# Patient Record
Sex: MALE | Race: WHITE | NOT HISPANIC OR LATINO
[De-identification: names, ages, dates, MRNs, and addresses within clinical notes are randomized per-mention and may not be internally consistent; named-entity substitution may affect disease eponyms.]

---

## 2017-12-26 PROBLEM — Z00.00 ENCOUNTER FOR PREVENTIVE HEALTH EXAMINATION: Status: ACTIVE | Noted: 2017-12-26

## 2018-01-11 ENCOUNTER — APPOINTMENT (OUTPATIENT)
Dept: SURGERY | Facility: CLINIC | Age: 53
End: 2018-01-11
Payer: COMMERCIAL

## 2018-01-11 VITALS — WEIGHT: 195 LBS | BODY MASS INDEX: 27.98 KG/M2

## 2018-01-11 PROCEDURE — 99243 OFF/OP CNSLTJ NEW/EST LOW 30: CPT

## 2018-01-31 ENCOUNTER — OUTPATIENT (OUTPATIENT)
Dept: OUTPATIENT SERVICES | Facility: HOSPITAL | Age: 53
LOS: 1 days | Discharge: HOME | End: 2018-01-31

## 2018-01-31 ENCOUNTER — APPOINTMENT (OUTPATIENT)
Dept: SURGERY | Facility: AMBULATORY SURGERY CENTER | Age: 53
End: 2018-01-31
Payer: COMMERCIAL

## 2018-01-31 PROCEDURE — 49561: CPT

## 2018-01-31 PROCEDURE — 49568: CPT

## 2018-02-02 DIAGNOSIS — K43.6 OTHER AND UNSPECIFIED VENTRAL HERNIA WITH OBSTRUCTION, WITHOUT GANGRENE: ICD-10-CM

## 2018-02-08 ENCOUNTER — APPOINTMENT (OUTPATIENT)
Dept: SURGERY | Facility: CLINIC | Age: 53
End: 2018-02-08
Payer: COMMERCIAL

## 2018-02-08 PROCEDURE — 99024 POSTOP FOLLOW-UP VISIT: CPT

## 2018-06-28 ENCOUNTER — OUTPATIENT (OUTPATIENT)
Dept: OUTPATIENT SERVICES | Facility: HOSPITAL | Age: 53
LOS: 1 days | Discharge: HOME | End: 2018-06-28
Payer: MEDICAID

## 2018-06-28 PROCEDURE — 93880 EXTRACRANIAL BILAT STUDY: CPT | Mod: 26

## 2018-06-28 PROCEDURE — 93970 EXTREMITY STUDY: CPT | Mod: 26

## 2018-07-06 DIAGNOSIS — I70.213 ATHEROSCLEROSIS OF NATIVE ARTERIES OF EXTREMITIES WITH INTERMITTENT CLAUDICATION, BILATERAL LEGS: ICD-10-CM

## 2018-07-06 DIAGNOSIS — R09.89 OTHER SPECIFIED SYMPTOMS AND SIGNS INVOLVING THE CIRCULATORY AND RESPIRATORY SYSTEMS: ICD-10-CM

## 2020-09-20 ENCOUNTER — INPATIENT (INPATIENT)
Facility: HOSPITAL | Age: 55
LOS: 2 days | Discharge: HOME | End: 2020-09-23
Attending: INTERNAL MEDICINE | Admitting: INTERNAL MEDICINE
Payer: MEDICAID

## 2020-09-20 VITALS
HEART RATE: 81 BPM | RESPIRATION RATE: 18 BRPM | DIASTOLIC BLOOD PRESSURE: 67 MMHG | WEIGHT: 195.11 LBS | SYSTOLIC BLOOD PRESSURE: 129 MMHG | TEMPERATURE: 98 F | OXYGEN SATURATION: 98 %

## 2020-09-20 LAB
ALBUMIN SERPL ELPH-MCNC: 4.6 G/DL — SIGNIFICANT CHANGE UP (ref 3.5–5.2)
ALP SERPL-CCNC: 66 U/L — SIGNIFICANT CHANGE UP (ref 30–115)
ALT FLD-CCNC: 14 U/L — SIGNIFICANT CHANGE UP (ref 0–41)
ANION GAP SERPL CALC-SCNC: 11 MMOL/L — SIGNIFICANT CHANGE UP (ref 7–14)
APTT BLD: 28.8 SEC — SIGNIFICANT CHANGE UP (ref 27–39.2)
AST SERPL-CCNC: 19 U/L — SIGNIFICANT CHANGE UP (ref 0–41)
BASOPHILS # BLD AUTO: 0.01 K/UL — SIGNIFICANT CHANGE UP (ref 0–0.2)
BASOPHILS NFR BLD AUTO: 0.1 % — SIGNIFICANT CHANGE UP (ref 0–1)
BILIRUB DIRECT SERPL-MCNC: <0.2 MG/DL — SIGNIFICANT CHANGE UP (ref 0–0.2)
BILIRUB INDIRECT FLD-MCNC: >0.3 MG/DL — SIGNIFICANT CHANGE UP (ref 0.2–1.2)
BILIRUB SERPL-MCNC: 0.5 MG/DL — SIGNIFICANT CHANGE UP (ref 0.2–1.2)
BLD GP AB SCN SERPL QL: SIGNIFICANT CHANGE UP
BUN SERPL-MCNC: 21 MG/DL — HIGH (ref 10–20)
CALCIUM SERPL-MCNC: 9.3 MG/DL — SIGNIFICANT CHANGE UP (ref 8.5–10.1)
CHLORIDE SERPL-SCNC: 96 MMOL/L — LOW (ref 98–110)
CO2 SERPL-SCNC: 27 MMOL/L — SIGNIFICANT CHANGE UP (ref 17–32)
CREAT SERPL-MCNC: 0.9 MG/DL — SIGNIFICANT CHANGE UP (ref 0.7–1.5)
EOSINOPHIL # BLD AUTO: 0 K/UL — SIGNIFICANT CHANGE UP (ref 0–0.7)
EOSINOPHIL NFR BLD AUTO: 0 % — SIGNIFICANT CHANGE UP (ref 0–8)
GLUCOSE SERPL-MCNC: 156 MG/DL — HIGH (ref 70–99)
HCT VFR BLD CALC: 46.7 % — SIGNIFICANT CHANGE UP (ref 42–52)
HGB BLD-MCNC: 15.8 G/DL — SIGNIFICANT CHANGE UP (ref 14–18)
IMM GRANULOCYTES NFR BLD AUTO: 0.3 % — SIGNIFICANT CHANGE UP (ref 0.1–0.3)
INR BLD: 1.08 RATIO — SIGNIFICANT CHANGE UP (ref 0.65–1.3)
LACTATE SERPL-SCNC: 1.7 MMOL/L — SIGNIFICANT CHANGE UP (ref 0.7–2)
LIDOCAIN IGE QN: 1918 U/L — HIGH (ref 7–60)
LYMPHOCYTES # BLD AUTO: 0.53 K/UL — LOW (ref 1.2–3.4)
LYMPHOCYTES # BLD AUTO: 3.3 % — LOW (ref 20.5–51.1)
MCHC RBC-ENTMCNC: 29.9 PG — SIGNIFICANT CHANGE UP (ref 27–31)
MCHC RBC-ENTMCNC: 33.8 G/DL — SIGNIFICANT CHANGE UP (ref 32–37)
MCV RBC AUTO: 88.3 FL — SIGNIFICANT CHANGE UP (ref 80–94)
MONOCYTES # BLD AUTO: 0.5 K/UL — SIGNIFICANT CHANGE UP (ref 0.1–0.6)
MONOCYTES NFR BLD AUTO: 3.2 % — SIGNIFICANT CHANGE UP (ref 1.7–9.3)
NEUTROPHILS # BLD AUTO: 14.75 K/UL — HIGH (ref 1.4–6.5)
NEUTROPHILS NFR BLD AUTO: 93.1 % — HIGH (ref 42.2–75.2)
NRBC # BLD: 0 /100 WBCS — SIGNIFICANT CHANGE UP (ref 0–0)
PLATELET # BLD AUTO: 280 K/UL — SIGNIFICANT CHANGE UP (ref 130–400)
POTASSIUM SERPL-MCNC: 3.8 MMOL/L — SIGNIFICANT CHANGE UP (ref 3.5–5)
POTASSIUM SERPL-SCNC: 3.8 MMOL/L — SIGNIFICANT CHANGE UP (ref 3.5–5)
PROT SERPL-MCNC: 7.1 G/DL — SIGNIFICANT CHANGE UP (ref 6–8)
PROTHROM AB SERPL-ACNC: 12.4 SEC — SIGNIFICANT CHANGE UP (ref 9.95–12.87)
RBC # BLD: 5.29 M/UL — SIGNIFICANT CHANGE UP (ref 4.7–6.1)
RBC # FLD: 12.5 % — SIGNIFICANT CHANGE UP (ref 11.5–14.5)
SODIUM SERPL-SCNC: 134 MMOL/L — LOW (ref 135–146)
WBC # BLD: 15.84 K/UL — HIGH (ref 4.8–10.8)
WBC # FLD AUTO: 15.84 K/UL — HIGH (ref 4.8–10.8)

## 2020-09-20 PROCEDURE — 74177 CT ABD & PELVIS W/CONTRAST: CPT | Mod: 26

## 2020-09-20 PROCEDURE — 99285 EMERGENCY DEPT VISIT HI MDM: CPT

## 2020-09-20 PROCEDURE — 71045 X-RAY EXAM CHEST 1 VIEW: CPT | Mod: 26

## 2020-09-20 RX ORDER — ONDANSETRON 8 MG/1
4 TABLET, FILM COATED ORAL ONCE
Refills: 0 | Status: COMPLETED | OUTPATIENT
Start: 2020-09-20 | End: 2020-09-20

## 2020-09-20 RX ORDER — MORPHINE SULFATE 50 MG/1
4 CAPSULE, EXTENDED RELEASE ORAL ONCE
Refills: 0 | Status: DISCONTINUED | OUTPATIENT
Start: 2020-09-20 | End: 2020-09-20

## 2020-09-20 RX ORDER — SODIUM CHLORIDE 9 MG/ML
2700 INJECTION, SOLUTION INTRAVENOUS ONCE
Refills: 0 | Status: COMPLETED | OUTPATIENT
Start: 2020-09-20 | End: 2020-09-20

## 2020-09-20 RX ORDER — SODIUM CHLORIDE 9 MG/ML
1000 INJECTION, SOLUTION INTRAVENOUS ONCE
Refills: 0 | Status: COMPLETED | OUTPATIENT
Start: 2020-09-20 | End: 2020-09-20

## 2020-09-20 RX ORDER — MORPHINE SULFATE 50 MG/1
6 CAPSULE, EXTENDED RELEASE ORAL ONCE
Refills: 0 | Status: DISCONTINUED | OUTPATIENT
Start: 2020-09-20 | End: 2020-09-20

## 2020-09-20 RX ADMIN — MORPHINE SULFATE 6 MILLIGRAM(S): 50 CAPSULE, EXTENDED RELEASE ORAL at 22:15

## 2020-09-20 RX ADMIN — MORPHINE SULFATE 4 MILLIGRAM(S): 50 CAPSULE, EXTENDED RELEASE ORAL at 21:08

## 2020-09-20 RX ADMIN — SODIUM CHLORIDE 2700 MILLILITER(S): 9 INJECTION, SOLUTION INTRAVENOUS at 21:09

## 2020-09-20 RX ADMIN — ONDANSETRON 4 MILLIGRAM(S): 8 TABLET, FILM COATED ORAL at 21:08

## 2020-09-20 RX ADMIN — SODIUM CHLORIDE 1000 MILLILITER(S): 9 INJECTION, SOLUTION INTRAVENOUS at 23:11

## 2020-09-20 RX ADMIN — MORPHINE SULFATE 4 MILLIGRAM(S): 50 CAPSULE, EXTENDED RELEASE ORAL at 23:11

## 2020-09-20 RX ADMIN — MORPHINE SULFATE 6 MILLIGRAM(S): 50 CAPSULE, EXTENDED RELEASE ORAL at 23:11

## 2020-09-20 NOTE — ED PROVIDER NOTE - NS ED ROS FT
General: No fever, chills, or weakness.  Eyes:  No visual changes, eye pain or discharge.  ENMT:  No hearing changes, pain, no sore throat or runny nose, no difficulty swallowing  Cardiac:  No chest pain, SOB or edema. No chest pain with exertion.  Respiratory:  No cough or respiratory distress. No hemoptysis. No history of asthma or RAD.  GI:  Abd pain. N/v nbnb. No diarrhea.   :  No dysuria, frequency or burning. no testicular pain  MS:  No myalgia, muscle weakness, joint pain or back pain.  Neuro:  No headache.  No LOC. No change in ambulation. No dizziness.  Skin:  No skin rash.   Endocrine: No history of thyroid disease or diabetes.

## 2020-09-20 NOTE — ED ADULT TRIAGE NOTE - PAIN: PRESENCE, MLM
Refill request received from pharmacy.  Medication pending for approval.     Patient information below:      Hemoglobin A1C (%)   Date Value   07/12/2016 5.7     LDL Calculated (mg/dL)   Date Value   08/29/2017 86     AST (U/L)   Date Value   08/29/2017 27     ALT (U/L)   Date Value   08/29/2017 48 (H)     BUN (mg/dL)   Date Value   08/29/2017 21 (H)      (goal A1C is < 7)   (goal LDL is <100) need 30-50% reduction from baseline     BP Readings from Last 3 Encounters:   08/29/17 100/70   05/03/17 121/86   04/24/17 (!) 154/89    (goal /80)      All Future Testing planned in [facility]:  Lab Frequency Next Occurrence   Protime         Last Office Visit With PCP:  10/30/2017      Next Visit Date:  Future Appointments  Date Time Provider Octavio Camargo   12/5/2017 9:00 AM Paramjit Calderón APR[address]            Patient Active Problem List:     DVT (deep venous thrombosis) ([address]     Generalized OA     DDD (degenerative disc disease)     Dyspepsia     Incisional hernia     Peripheral neuropathy ([address]     Hyperkalemia     Hyperlipidemia     Polyarthralgia     Edema     Vitamin D deficiency     B12 deficiency     Colon cancer screening     Internal hemorrhoid     Diverticular disease of large intestine without perforation or abscess
complains of pain/discomfort

## 2020-09-20 NOTE — ED PROVIDER NOTE - CLINICAL SUMMARY MEDICAL DECISION MAKING FREE TEXT BOX
55M presented to [facility] for intermittent midline abdominal pain, exacerbated with food, associated with vomiting nbnb x3. Pt in severe pain, upper abdominal tenderness on exam. Labs notable for lipase of 1,918, WBC of 15. LFTs wnl. CT with evidence of acute pancreatitis. RUQ US confirmed pancreatitis, no cholecystitis. Pt denies EtOH abuse. Morphine, IVF, zofran given and pt npo.

## 2020-09-20 NOTE — ED PROVIDER NOTE - PHYSICAL EXAMINATION
Constitutional: Well developed, well nourished. NAD. Good general hygiene  Head: Atraumatic.  Eyes: PERRLA. EOMI without discomfort.   ENT: No nasal discharge. Mucous membranes moist.  Neck: Supple. Painless ROM.  Cardiovascular: Regular rhythm. Regular rate. Normal S1 and S2. No murmurs. 2+ pulses in all extremities.   Pulmonary: Normal respiratory rate and effort. Lungs clear to auscultation bilaterally. No wheezing, rales, or rhonchi. Bilateral, equal lung expansion.   Abdominal: Soft. Epigastric tenderness, guarding. no rigidity.  : Normal testicular lie, no tenderness, no ecchymosis.   Extremities. Pelvis stable. No lower extremity edema. Symmetric calves.  Skin: No rashes.   Neuro: AAOx3. No focal neurological deficits.  Psych: Normal mood. Normal affect.

## 2020-09-20 NOTE — ED ADULT NURSE NOTE - OBJECTIVE STATEMENT
Presents in ED c/o abdominal pain for 1 month but today pain got worse 10/10. Vomited several times. Denies diarrhea.

## 2020-09-20 NOTE — ED PROVIDER NOTE - OBJECTIVE STATEMENT
55M h/o prior alcohol use several years ago, prior smoker p/w abd pain. Epigastric, 9/10, radiating to back, constant, onset earlier today. Admits to n/v nbnb X3 today. Denies fever/chills, cough, sore throat, Cp, SOB, dysuria, testicular pain. never had pain like this before, never had surgeries in abdomen.

## 2020-09-20 NOTE — ED ADULT TRIAGE NOTE - CHIEF COMPLAINT QUOTE
Pt c/o abd pain X 1 month, but today the pain came back 10/10, had 3 episodes of NBNB vomiting, denies diarrhea or fever.

## 2020-09-20 NOTE — ED PROVIDER NOTE - CARE PLAN
Principal Discharge DX:	Acute pancreatitis without infection or necrosis, unspecified pancreatitis type

## 2020-09-20 NOTE — ED PROVIDER NOTE - ATTENDING CONTRIBUTION TO CARE
55M with PMH periumbilical hernia repair who presents to [facility] for periumbilical pain that has been intermittent over the past month, but became acutely worse today. He reports symptoms usually exacerbated with food. He ate a protein bar this morning and felt sharp/burning periumbilical pain associated with vomiting nbnb x3. Patient had a soft BM this am. Denies bloody or melanotic stool, urinary Sx.     CONSTITUTIONAL: Well-developed; well-nourished; in no acute distress. Sitting up and providing appropriate history and physical examination  SKIN: skin exam is warm and dry, no acute rash.  HEAD: Normocephalic; atraumatic.  EYES: PERRL, 3 mm bilateral, no nystagmus, EOM intact; conjunctiva and sclera clear.  ENT: No nasal discharge; airway clear.  NECK: Supple; non tender. + full passive ROM in all directions. No JVD  CARD: S1, S2 normal; no murmurs, gallops, or rubs. Regular rate and rhythm. + Symmetric Strong Pulses  RESP: No wheezes, rales or rhonchi. Good air movement bilaterally  ABD: soft; non-distended; + left upper and mildine abdominal tenderness with involuntary guarding. No Rebound, No signs of peritonitis, No CVA tenderness. No pulsatile abdominal mass. + Strong and Symmetric Pulses  EXT: Normal ROM. No clubbing, cyanosis or edema. Dp and Pt Pulses intact. Cap refill less than 3 seconds  NEURO: stable gait, no sensory or motor deficits, Alert, oriented, grossly unremarkable. No Focal deficits. GCS 15.  PSYCH: Cooperative, appropriate.    P: will assess for recurrent hernia, no evidence of strangulation or incarceration. Will also assess for colitis, diverticulitis, pancreatitis, cholelithiasis. Will obtain cbc, cmp, lipase, ua, coags, CT abd/pelvis, will treat pain, give zofran, ivf and reassess. 55M with PMH periumbilical hernia repair presents to [facility] for periumbilical pain that has been intermittent over the past month, but became acutely worse today. He reports symptoms usually exacerbated with food. He ate a protein bar this morning and felt sharp/burning periumbilical pain associated with vomiting nbnb x3. Patient had a soft BM this am. Denies bloody or melanotic stool, urinary Sx.     CONSTITUTIONAL: Well-developed; well-nourished; in no acute distress. Sitting up and providing appropriate history and physical examination  SKIN: skin exam is warm and dry, no acute rash.  HEAD: Normocephalic; atraumatic.  EYES: PERRL, 3 mm bilateral, no nystagmus, EOM intact; conjunctiva and sclera clear.  ENT: No nasal discharge; airway clear.  NECK: Supple; non tender. + full passive ROM in all directions. No JVD  CARD: S1, S2 normal; no murmurs, gallops, or rubs. Regular rate and rhythm. + Symmetric Strong Pulses  RESP: No wheezes, rales or rhonchi. Good air movement bilaterally  ABD: soft; non-distended; + left upper and mildine abdominal tenderness with involuntary guarding. No Rebound, No signs of peritonitis, No CVA tenderness. No pulsatile abdominal mass. + Strong and Symmetric Pulses  EXT: Normal ROM. No clubbing, cyanosis or edema. Dp and Pt Pulses intact. Cap refill less than 3 seconds  NEURO: stable gait, no sensory or motor deficits, Alert, oriented, grossly unremarkable. No Focal deficits. GCS 15.  PSYCH: Cooperative, appropriate.    P: will assess for recurrent hernia, no evidence of strangulation or incarceration. Will also assess for colitis, diverticulitis, pancreatitis, cholelithiasis. Will obtain cbc, cmp, lipase, ua, coags, CT abd/pelvis, will treat pain, give zofran, ivf and reassess.

## 2020-09-21 LAB
A1C WITH ESTIMATED AVERAGE GLUCOSE RESULT: 5.9 % — HIGH (ref 4–5.6)
ALBUMIN SERPL ELPH-MCNC: 3.8 G/DL — SIGNIFICANT CHANGE UP (ref 3.5–5.2)
ALP SERPL-CCNC: 49 U/L — SIGNIFICANT CHANGE UP (ref 30–115)
ALT FLD-CCNC: 9 U/L — SIGNIFICANT CHANGE UP (ref 0–41)
ANION GAP SERPL CALC-SCNC: 8 MMOL/L — SIGNIFICANT CHANGE UP (ref 7–14)
APPEARANCE UR: CLEAR — SIGNIFICANT CHANGE UP
AST SERPL-CCNC: 14 U/L — SIGNIFICANT CHANGE UP (ref 0–41)
BASOPHILS # BLD AUTO: 0.02 K/UL — SIGNIFICANT CHANGE UP (ref 0–0.2)
BASOPHILS NFR BLD AUTO: 0.1 % — SIGNIFICANT CHANGE UP (ref 0–1)
BILIRUB SERPL-MCNC: 0.9 MG/DL — SIGNIFICANT CHANGE UP (ref 0.2–1.2)
BILIRUB UR-MCNC: NEGATIVE — SIGNIFICANT CHANGE UP
BUN SERPL-MCNC: 10 MG/DL — SIGNIFICANT CHANGE UP (ref 10–20)
CALCIUM SERPL-MCNC: 8.9 MG/DL — SIGNIFICANT CHANGE UP (ref 8.5–10.1)
CHLORIDE SERPL-SCNC: 100 MMOL/L — SIGNIFICANT CHANGE UP (ref 98–110)
CHOLEST SERPL-MCNC: 180 MG/DL — SIGNIFICANT CHANGE UP (ref 100–200)
CO2 SERPL-SCNC: 29 MMOL/L — SIGNIFICANT CHANGE UP (ref 17–32)
COLOR SPEC: SIGNIFICANT CHANGE UP
CREAT SERPL-MCNC: 0.8 MG/DL — SIGNIFICANT CHANGE UP (ref 0.7–1.5)
DIFF PNL FLD: NEGATIVE — SIGNIFICANT CHANGE UP
EOSINOPHIL # BLD AUTO: 0.01 K/UL — SIGNIFICANT CHANGE UP (ref 0–0.7)
EOSINOPHIL NFR BLD AUTO: 0.1 % — SIGNIFICANT CHANGE UP (ref 0–8)
ESTIMATED AVERAGE GLUCOSE: 123 MG/DL — HIGH (ref 68–114)
GLUCOSE SERPL-MCNC: 106 MG/DL — HIGH (ref 70–99)
GLUCOSE UR QL: NEGATIVE — SIGNIFICANT CHANGE UP
HCT VFR BLD CALC: 43.3 % — SIGNIFICANT CHANGE UP (ref 42–52)
HCV AB S/CO SERPL IA: 0.04 COI — SIGNIFICANT CHANGE UP
HCV AB SERPL-IMP: SIGNIFICANT CHANGE UP
HDLC SERPL-MCNC: 49 MG/DL — SIGNIFICANT CHANGE UP
HGB BLD-MCNC: 14.4 G/DL — SIGNIFICANT CHANGE UP (ref 14–18)
IMM GRANULOCYTES NFR BLD AUTO: 0.6 % — HIGH (ref 0.1–0.3)
KETONES UR-MCNC: NEGATIVE — SIGNIFICANT CHANGE UP
LEUKOCYTE ESTERASE UR-ACNC: NEGATIVE — SIGNIFICANT CHANGE UP
LIPID PNL WITH DIRECT LDL SERPL: 119 MG/DL — SIGNIFICANT CHANGE UP (ref 4–129)
LYMPHOCYTES # BLD AUTO: 1.19 K/UL — LOW (ref 1.2–3.4)
LYMPHOCYTES # BLD AUTO: 7.4 % — LOW (ref 20.5–51.1)
MAGNESIUM SERPL-MCNC: 1.7 MG/DL — LOW (ref 1.8–2.4)
MCHC RBC-ENTMCNC: 30 PG — SIGNIFICANT CHANGE UP (ref 27–31)
MCHC RBC-ENTMCNC: 33.3 G/DL — SIGNIFICANT CHANGE UP (ref 32–37)
MCV RBC AUTO: 90.2 FL — SIGNIFICANT CHANGE UP (ref 80–94)
MONOCYTES # BLD AUTO: 1.1 K/UL — HIGH (ref 0.1–0.6)
MONOCYTES NFR BLD AUTO: 6.8 % — SIGNIFICANT CHANGE UP (ref 1.7–9.3)
NEUTROPHILS # BLD AUTO: 13.78 K/UL — HIGH (ref 1.4–6.5)
NEUTROPHILS NFR BLD AUTO: 85 % — HIGH (ref 42.2–75.2)
NITRITE UR-MCNC: NEGATIVE — SIGNIFICANT CHANGE UP
NRBC # BLD: 0 /100 WBCS — SIGNIFICANT CHANGE UP (ref 0–0)
PH UR: 7 — SIGNIFICANT CHANGE UP (ref 5–8)
PLATELET # BLD AUTO: 237 K/UL — SIGNIFICANT CHANGE UP (ref 130–400)
POTASSIUM SERPL-MCNC: 4 MMOL/L — SIGNIFICANT CHANGE UP (ref 3.5–5)
POTASSIUM SERPL-SCNC: 4 MMOL/L — SIGNIFICANT CHANGE UP (ref 3.5–5)
PROT SERPL-MCNC: 5.8 G/DL — LOW (ref 6–8)
PROT UR-MCNC: SIGNIFICANT CHANGE UP
RBC # BLD: 4.8 M/UL — SIGNIFICANT CHANGE UP (ref 4.7–6.1)
RBC # FLD: 12.7 % — SIGNIFICANT CHANGE UP (ref 11.5–14.5)
SARS-COV-2 RNA SPEC QL NAA+PROBE: SIGNIFICANT CHANGE UP
SODIUM SERPL-SCNC: 137 MMOL/L — SIGNIFICANT CHANGE UP (ref 135–146)
SP GR SPEC: >1.05 (ref 1.01–1.03)
TOTAL CHOLESTEROL/HDL RATIO MEASUREMENT: 3.7 RATIO — LOW (ref 4–5.5)
TRIGL SERPL-MCNC: 68 MG/DL — SIGNIFICANT CHANGE UP (ref 10–149)
TRIGL SERPL-MCNC: 70 MG/DL — SIGNIFICANT CHANGE UP (ref 10–149)
UROBILINOGEN FLD QL: SIGNIFICANT CHANGE UP
WBC # BLD: 16.19 K/UL — HIGH (ref 4.8–10.8)
WBC # FLD AUTO: 16.19 K/UL — HIGH (ref 4.8–10.8)

## 2020-09-21 PROCEDURE — 76705 ECHO EXAM OF ABDOMEN: CPT | Mod: 26

## 2020-09-21 PROCEDURE — 99223 1ST HOSP IP/OBS HIGH 75: CPT

## 2020-09-21 PROCEDURE — 71046 X-RAY EXAM CHEST 2 VIEWS: CPT | Mod: 26

## 2020-09-21 RX ORDER — CHLORHEXIDINE GLUCONATE 213 G/1000ML
1 SOLUTION TOPICAL ONCE
Refills: 0 | Status: COMPLETED | OUTPATIENT
Start: 2020-09-21 | End: 2020-09-21

## 2020-09-21 RX ORDER — ENOXAPARIN SODIUM 100 MG/ML
40 INJECTION SUBCUTANEOUS AT BEDTIME
Refills: 0 | Status: DISCONTINUED | OUTPATIENT
Start: 2020-09-21 | End: 2020-09-23

## 2020-09-21 RX ORDER — ONDANSETRON 8 MG/1
4 TABLET, FILM COATED ORAL EVERY 6 HOURS
Refills: 0 | Status: DISCONTINUED | OUTPATIENT
Start: 2020-09-21 | End: 2020-09-23

## 2020-09-21 RX ORDER — MORPHINE SULFATE 50 MG/1
4 CAPSULE, EXTENDED RELEASE ORAL EVERY 4 HOURS
Refills: 0 | Status: DISCONTINUED | OUTPATIENT
Start: 2020-09-21 | End: 2020-09-21

## 2020-09-21 RX ORDER — ACETAMINOPHEN 500 MG
650 TABLET ORAL EVERY 6 HOURS
Refills: 0 | Status: DISCONTINUED | OUTPATIENT
Start: 2020-09-21 | End: 2020-09-23

## 2020-09-21 RX ORDER — MORPHINE SULFATE 50 MG/1
2 CAPSULE, EXTENDED RELEASE ORAL
Refills: 0 | Status: DISCONTINUED | OUTPATIENT
Start: 2020-09-21 | End: 2020-09-21

## 2020-09-21 RX ORDER — MORPHINE SULFATE 50 MG/1
2 CAPSULE, EXTENDED RELEASE ORAL ONCE
Refills: 0 | Status: DISCONTINUED | OUTPATIENT
Start: 2020-09-21 | End: 2020-09-21

## 2020-09-21 RX ORDER — SENNA PLUS 8.6 MG/1
2 TABLET ORAL AT BEDTIME
Refills: 0 | Status: DISCONTINUED | OUTPATIENT
Start: 2020-09-21 | End: 2020-09-23

## 2020-09-21 RX ORDER — MORPHINE SULFATE 50 MG/1
4 CAPSULE, EXTENDED RELEASE ORAL EVERY 4 HOURS
Refills: 0 | Status: DISCONTINUED | OUTPATIENT
Start: 2020-09-21 | End: 2020-09-23

## 2020-09-21 RX ORDER — SODIUM CHLORIDE 9 MG/ML
1000 INJECTION, SOLUTION INTRAVENOUS
Refills: 0 | Status: DISCONTINUED | OUTPATIENT
Start: 2020-09-21 | End: 2020-09-23

## 2020-09-21 RX ADMIN — Medication 1 TABLET(S): at 12:22

## 2020-09-21 RX ADMIN — SODIUM CHLORIDE 250 MILLILITER(S): 9 INJECTION, SOLUTION INTRAVENOUS at 03:53

## 2020-09-21 RX ADMIN — MORPHINE SULFATE 4 MILLIGRAM(S): 50 CAPSULE, EXTENDED RELEASE ORAL at 20:04

## 2020-09-21 RX ADMIN — MORPHINE SULFATE 2 MILLIGRAM(S): 50 CAPSULE, EXTENDED RELEASE ORAL at 08:53

## 2020-09-21 RX ADMIN — ENOXAPARIN SODIUM 40 MILLIGRAM(S): 100 INJECTION SUBCUTANEOUS at 21:26

## 2020-09-21 RX ADMIN — SODIUM CHLORIDE 250 MILLILITER(S): 9 INJECTION, SOLUTION INTRAVENOUS at 20:04

## 2020-09-21 RX ADMIN — MORPHINE SULFATE 4 MILLIGRAM(S): 50 CAPSULE, EXTENDED RELEASE ORAL at 14:33

## 2020-09-21 RX ADMIN — SODIUM CHLORIDE 1000 MILLILITER(S): 9 INJECTION, SOLUTION INTRAVENOUS at 03:12

## 2020-09-21 RX ADMIN — MORPHINE SULFATE 4 MILLIGRAM(S): 50 CAPSULE, EXTENDED RELEASE ORAL at 21:26

## 2020-09-21 RX ADMIN — MORPHINE SULFATE 2 MILLIGRAM(S): 50 CAPSULE, EXTENDED RELEASE ORAL at 09:10

## 2020-09-21 RX ADMIN — MORPHINE SULFATE 2 MILLIGRAM(S): 50 CAPSULE, EXTENDED RELEASE ORAL at 07:22

## 2020-09-21 RX ADMIN — Medication 5 MILLIGRAM(S): at 21:26

## 2020-09-21 RX ADMIN — MORPHINE SULFATE 2 MILLIGRAM(S): 50 CAPSULE, EXTENDED RELEASE ORAL at 06:45

## 2020-09-21 RX ADMIN — MORPHINE SULFATE 4 MILLIGRAM(S): 50 CAPSULE, EXTENDED RELEASE ORAL at 14:17

## 2020-09-21 RX ADMIN — SODIUM CHLORIDE 2700 MILLILITER(S): 9 INJECTION, SOLUTION INTRAVENOUS at 03:12

## 2020-09-21 RX ADMIN — SENNA PLUS 2 TABLET(S): 8.6 TABLET ORAL at 21:26

## 2020-09-21 RX ADMIN — MORPHINE SULFATE 2 MILLIGRAM(S): 50 CAPSULE, EXTENDED RELEASE ORAL at 04:49

## 2020-09-21 RX ADMIN — MORPHINE SULFATE 2 MILLIGRAM(S): 50 CAPSULE, EXTENDED RELEASE ORAL at 03:43

## 2020-09-21 NOTE — H&P ADULT - ATTENDING COMMENTS
Chart reviewed, patient examined. Pertinent results reviewed.  Case discussed with HO; specialist evaluation pending; CT noted  HD#1     3rd episode for pt. He is not a heavy ETOH user.   Pain is controlled with opioids.   GI fellow is seeing the pt this AM.    Vital Signs Last 24 Hrs  T(C): 36.7 (21 Sep 2020 08:00), Max: 36.8 (20 Sep 2020 20:25)  T(F): 98 (21 Sep 2020 08:00), Max: 98.2 (20 Sep 2020 20:25)  HR: 83 (21 Sep 2020 08:00) (81 - 83)  BP: 106/55 (21 Sep 2020 08:00) (106/55 - 129/67)  BP(mean): --  RR: 18 (21 Sep 2020 08:00) (18 - 18)  SpO2: 97% (21 Sep 2020 08:00) (97% - 98%)  PE: pt overweight; NAD  MMM; LUNGS: clear; HT: RRR, no MRG  ABD: obese; BS+; + epigastric tenderness; no HSM, Mass  EXT: no CCE; NEURO: grossly intact    I/P: Acute Pancreatitis  -IV fluids/NPO  - GI to see & advise;   - MR will be reviewed with GI as to timing  - Pain control Chart reviewed, patient examined. Pertinent results reviewed.  Case discussed with HO; specialist evaluation pending; CT noted  HD#1; See progress note dated today as well     3rd episode for pt. He is not a heavy ETOH user.   Pain is controlled with opioids.   GI fellow is seeing the pt this AM.    Vital Signs Last 24 Hrs  T(C): 36.7 (21 Sep 2020 08:00), Max: 36.8 (20 Sep 2020 20:25)  T(F): 98 (21 Sep 2020 08:00), Max: 98.2 (20 Sep 2020 20:25)  HR: 83 (21 Sep 2020 08:00) (81 - 83)  BP: 106/55 (21 Sep 2020 08:00) (106/55 - 129/67)  BP(mean): --  RR: 18 (21 Sep 2020 08:00) (18 - 18)  SpO2: 97% (21 Sep 2020 08:00) (97% - 98%)  PE: pt overweight; NAD  MMM; LUNGS: clear; HT: RRR, no MRG  ABD: obese; BS+; + epigastric tenderness; no HSM, Mass  EXT: no CCE; NEURO: grossly intact    I/P: Acute Pancreatitis  -IV fluids/NPO  - GI to see & advise;   - MR will be reviewed with GI as to timing  - Pain control

## 2020-09-21 NOTE — H&P ADULT - NSHPPHYSICALEXAM_GEN_ALL_CORE
LOS:     VITALS:   T(C): 36.8 (09-20-20 @ 20:25), Max: 36.8 (09-20-20 @ 20:25)  HR: 81 (09-20-20 @ 20:25) (81 - 81)  BP: 129/67 (09-20-20 @ 20:25) (129/67 - 129/67)  RR: 18 (09-20-20 @ 20:25) (18 - 18)  SpO2: 98% (09-20-20 @ 20:25) (98% - 98%)    GENERAL: NAD, lying in bed comfortably  HEAD:  Atraumatic, Normocephalic  EYES: EOMI, PERRLA, conjunctiva and sclera clear  ENT: Moist mucous membranes  NECK: Supple, No JVD  CHEST/LUNG: Clear to auscultation bilaterally; No rales, rhonchi, wheezing, or rubs. Unlabored respirations  HEART: Regular rate and rhythm; No murmurs, rubs, or gallops  ABDOMEN: BSx4; Soft, nontender, nondistended  EXTREMITIES:  2+ Peripheral Pulses, brisk capillary refill. No clubbing, cyanosis, or edema  NERVOUS SYSTEM:  A&Ox3, no focal deficits   SKIN: No rashes or lesions

## 2020-09-21 NOTE — PROGRESS NOTE ADULT - ASSESSMENT
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  - continue goal directed aggressive fluid resuscitation, repeat Hct/BUN improvement noted   - pain control  - will need MRCP and MRI pancreas protocol as o/p   - F/u with GI guidance for this serious condition      Condition: Serious  Prognosis: Fair  This borrow aim for home on discharge estimate 2-5 days depending on ongoing response to therapy.

## 2020-09-21 NOTE — CONSULT NOTE ADULT - ASSESSMENT
Assessment and Plan:    Pt is 56 y/o male with no significant PMHx, not on any home meds, presenting for acute epigastric abdominal pain, found to have acute pancreatitis x3 in past month    # Acute pancreatitis x3 in past month  - no clear etiology; no stones on imaging, pt denies alcohol use, no recent new medications, triglycerides and Ca wnl.  - continue goal directed aggressive fluid resuscitation, repeat Hct/BUN improvement noted   - pain control  - will need MRCP and MRI pancreas protocol as o/p       Attending recs to follow Assessment and Plan:    Pt is 54 y/o male with no significant PMHx, not on any home meds, presenting for acute epigastric abdominal pain, found to have acute pancreatitis x3 in past month    # Acute pancreatitis x3 in past month  - no clear etiology; no stones on imaging, pt denies alcohol use, no recent new medications, triglycerides and Ca wnl.  - continue goal directed aggressive fluid resuscitation, repeat Hct/BUN improvement noted   - pain control  - will need MRCP and MRI pancreas protocol as o/p

## 2020-09-21 NOTE — H&P ADULT - HISTORY OF PRESENT ILLNESS
This is a 55 years old male with no significant past medical history who presented to ED with worsening epigastric pain.    Patient was fine till this morning hen he developed severe e[pigastric pain after eating a protein bar. Pain was sudden in onset, continous, located in epigastrium  worsening gradually to 10/10 intensity, non-radiating associated with nausa and 3 episodes of NBNB vomiting. Patient reports no relieving factor but endorses that food intake made worse. Patient denies, headache, dizziness, chest pain, SOB, constipation, diarrha, alcohol intake.    In ED , patient's vitals were as follows     T(C): 36.8 (09-20-20 @ 20:25), Max: 36.8 (09-20-20 @ 20:25)  HR: 81 (09-20-20 @ 20:25) (81 - 81)  BP: 129/67 (09-20-20 @ 20:25) (129/67 - 129/67)  RR: 18 (09-20-20 @ 20:25) (18 - 18)  SpO2: 98% (09-20-20 @ 20:25) (98% - 98%)    His labs were significant for lipase of 1900 and CT abdomen showed acute inflammation of pancreatitis and US abdomen showed gall bladder sludge without evidence of cholelithiasis. Patient received 2.7L of IVF in ED.

## 2020-09-21 NOTE — H&P ADULT - ASSESSMENT
This is a 55 years old male with no significant past medical history who presented to ED with worsening epigastric pain.    Patient is being admitted for the work up and treatment of Acute Pancreatitis.    Impression:    # Acute pancreatitis with cyst formation    Plan :    - Acute onset abdominal pain, lipase ~ 1900  - CT abdomen reveals Acute pancreatitis. 2.2 x 1.5 cm cystic focus in the uncinate process favored to reflect dilated side branches rather than acute pancreatic collection. Nonspecific focal dilatation of the main pancreatic duct to 7 mm in the pancreatic head without upstream atrophy. Suggest follow-up short interval pancreatic MRI once acute pathology resolves.  - c/w aggressive fluid hydration, IVF @ 250 cc/hr. NPO except meds.  - pain control with IV morphine 2mg q2hrs for severe pain  - bowel regimen  - GI consult   This is a 55 years old male with no significant past medical history who presented to ED with worsening epigastric pain.    Patient is being admitted for the work up and treatment of Acute Pancreatitis.    Impression:    # Acute pancreatitis with cyst formation in he uncinate process    Plan :    - Acute onset abdominal pain, lipase ~ 1900  - CT abdomen reveals Acute pancreatitis. 2.2 x 1.5 cm cystic focus in the uncinate process favored to reflect dilated side branches rather than acute pancreatic collection. Nonspecific focal dilatation of the main pancreatic duct to 7 mm in the pancreatic head without upstream atrophy. Suggest follow-up short interval pancreatic MRI once acute pathology resolves.  - US abdomen reveals gall bladder sludge , no signs of cholelithiasis  - check lipid profile, pt not currently taking any meds at home  - c/w aggressive fluid hydration, IVF @ 250 cc/hr. NPO except meds.  - pain control with IV morphine 2mg q2hrs for severe pain  - bowel regimen  - GI consult    # obesity  - diet counselling  - lipid profile, HBA1C    DVT ppx ; lovenox  GI ppx : not needed  Ambulate as tolerated   Diet : NPO except meds  Code Full     This is a 55 years old male with no significant past medical history who presented to ED with worsening epigastric pain.    Patient is being admitted for the work up and treatment of Acute Pancreatitis.    Impression:    # Acute pancreatitis with cyst formation in he uncinate process    Plan :    - Acute onset abdominal pain, lipase ~ 1900  - CT abdomen reveals Acute pancreatitis. 2.2 x 1.5 cm cystic focus in the uncinate process favored to reflect dilated side branches rather than acute pancreatic collection. Nonspecific focal dilatation of the main pancreatic duct to 7 mm in the pancreatic head without upstream atrophy. Suggest follow-up short interval pancreatic MRI once acute pathology resolves.  - US abdomen reveals gall bladder sludge , no signs of cholelithiasis  - check lipid profile, pt not currently taking any meds at home  - c/w aggressive fluid hydration, IVF @ 250 cc/hr. NPO except meds. F/U repeat BMP at 11 AM to confirm BUN and Hct are trending down.  - pain control with IV morphine 2mg q2hrs for severe pain  - bowel regimen  - GI consult    # obesity  - diet counselling  - lipid profile, HBA1C    DVT ppx ; lovenox  GI ppx : not needed  Ambulate as tolerated   Diet : NPO except meds  Code Full    Repeat Blood work ordered for 11 AM     This is a 55 years old male with no significant past medical history who presented to ED with worsening epigastric pain.    Patient is being admitted for the work up and treatment of Acute Pancreatitis.    Impression:    # Acute pancreatitis with cyst formation in he uncinate process    Plan :    - Acute onset abdominal pain, lipase ~ 1900  - CT abdomen reveals Acute pancreatitis. 2.2 x 1.5 cm cystic focus in the uncinate process favored to reflect dilated side branches rather than acute pancreatic collection. Nonspecific focal dilatation of the main pancreatic duct to 7 mm in the pancreatic head without upstream atrophy. Suggest follow-up short interval pancreatic MRI once acute pathology resolves.  - If all other causes are ruled out, pt might benefit from Outpatient MRCP or EUS to r/o anomalous pancreatic duct as a cause.  - US abdomen reveals gall bladder sludge , no signs of cholelithiasis  - check lipid profile, pt not currently taking any meds at home  - c/w aggressive fluid hydration, IVF @ 250 cc/hr. NPO except meds. F/U repeat BMP at 11 AM to confirm BUN and Hct are trending down.  - pain control with IV morphine 2mg q2hrs for severe pain  - bowel regimen  - GI consult    # obesity  - diet counselling  - lipid profile, HBA1C    DVT ppx ; lovenox  GI ppx : not needed  Ambulate as tolerated   Diet : NPO except meds  Code Full    Repeat Blood work ordered for 11 AM

## 2020-09-21 NOTE — PROGRESS NOTE ADULT - SUBJECTIVE AND OBJECTIVE BOX
Chart reviewed, patient examined. Pertinent results reviewed.  Call to HO; specialist f/u reviewed  HD#1    Patient is a 55y old  Male who presented with a chief complaint of Epigastric pain (21 Sep 2020 09:26)  Admitted on: 09-21-20  HPI:  This is a 55 years old male with no significant past medical history who presented to ED with worsening epigastric pain. Patient was fine till this morning hen he developed severe e[pigastric pain after eating a protein bar. Pain was sudden in onset, continuous located in epigastrium  worsening gradually to 10/10 intensity, non-radiating associated with nausa and 3 episodes of NBNB vomiting. Patient reports no relieving factor but endorses that food intake made worse. Patient denies, headache, dizziness, chest pain, SOB, constipation, diarrhea or alcohol intake.     In ED , patient's vitals were as follows     T(C): 36.8 (09-20-20 @ 20:25), Max: 36.8 (09-20-20 @ 20:25)  HR: 81 (09-20-20 @ 20:25) (81 - 81)  BP: 129/67 (09-20-20 @ 20:25) (129/67 - 129/67)  RR: 18 (09-20-20 @ 20:25) (18 - 18)  SpO2: 98% (09-20-20 @ 20:25) (98% - 98%)    His labs were significant for lipase of 1900 and CT abdomen showed acute inflammation of pancreatitis and US abdomen showed gall bladder sludge without evidence of cholelithiasis. Patient received 2.7L of IVF in ED, & Continues to receive IV fluids.  He States that he feels better with IV opioids and IV fluids.    GI HPI: Pt is 54 y/o male with no significant PMHx, not on any home meds, presenting for acute epigastric abdominal pain. Patient reports that this is his 3rd episode of similar abdominal pain in the past month. First episode was 1 month ago with severe pain but pt thought it was food poisoning and it eventually passed with conservative management staying at home. 2nd episode was about 1-2 weeks later and milder in nature. Pt was able to go to work despite the pain. Pt does not remember any specific foods or triggers during those episodes. Yesterday he developed severe epigastric pain after eating a protein bar. Pain was sudden in onset, continuos, located in epigastrium, worsening gradually to 10/10 intensity, non-radiating associated with nausea and 3 episodes of NBNB vomiting. Patient reports no relieving factor but endorses that food/fluid intake did make it worse. Patient denies, headache, dizziness, chest pain, SOB, constipation, diarrhea, or any alcohol intake.    Of note, pt reports that his diet has been very poor up until 1 month ago after 1st episode when he started eating more carefully. Reports high intake of fried food, meat, and chips. Pt also has Hx of heavy alcohol abuse, s/p rehab with no alcohol past 30 years. Pt also has Hx of unspecified drug use, including "one or two times" IVDA, but has also not used any drugs past 30 years. Pt states he was tested for Hepatitis and HIV when he went to rehab. Pt also has ~30 pack-year smoking Hx, quit one year ago. Pt also reports that his dad did have some problems with his pancreas when he was young and had to have "a lot of bowel removed". Further Hx is unclear.      Prior records Reviewed: Y  History obtained from person other than patient: N    Prior EGD: none  Prior Colonoscopy: none      PAST MEDICAL & SURGICAL HISTORY:  no pertinent medical Hx  s/p incarcerated supraumbilical ventral hernia repair with mesh, 2018  FAMILY HISTORY:  Dad: DM, stroke, "Pancreas problems" that required bowel resection    Social History:  Tobacco: 30 pack-year Hx. quit 1 year ago  Alcohol: heavy past alcohol use, quit 30 years ago  Drugs: Hx of unspecified drug use, including "one or two times" IVDU, quit 30 years ago    Home Medications:  NONE    MEDICATIONS  (STANDING):  bisacodyl 5 milliGRAM(s) Oral at bedtime  chlorhexidine 2% Cloths 1 Application(s) Topical once  enoxaparin Injectable 40 milliGRAM(s) SubCutaneous at bedtime  lactated ringers. 1000 milliLiter(s) (250 mL/Hr) IV Continuous <Continuous>  multivitamin 1 Tablet(s) Oral daily  senna 2 Tablet(s) Oral at bedtime    MEDICATIONS  (PRN):  acetaminophen   Tablet .. 650 milliGRAM(s) Oral every 6 hours PRN Mild Pain (1 - 3)  morphine  - Injectable 4 milliGRAM(s) IV Push every 4 hours PRN Moderate Pain (4 - 6)  ondansetron Injectable 4 milliGRAM(s) IV Push every 6 hours PRN Nausea      Allergies  No Known Allergies      Review of Systems:   Constitutional:  No Fever, No Chills  ENT/Mouth:  No Hearing Changes,  No Difficulty Swallowing  Eyes:  No Eye Pain, No Vision Changes  Cardiovascular:  No Chest Pain, No Palpitations  Respiratory:  No Cough, No Dyspnea  Gastrointestinal:  As described in HPI  Musculoskeletal:  No Joint Swelling, No Back Pain  Skin:  No Skin Lesions, No Jaundice  Neuro:  No Syncope, No Dizziness  Heme/Lymph:  No Bruising, No Bleeding.      Physical Examination:  Vital Signs Last 24 Hrs  T(C): 37.9 (21 Sep 2020 20:12), Max: 37.9 (21 Sep 2020 20:12)  T(F): 100.3 (21 Sep 2020 20:12), Max: 100.3 (21 Sep 2020 20:12)  HR: 89 (21 Sep 2020 20:12) (83 - 89)  BP: 100/67 (21 Sep 2020 20:12) (100/67 - 106/55)  BP(mean): --  RR: 20 (21 Sep 2020 20:12) (18 - 20)  SpO2: 92% (21 Sep 2020 19:55) (92% - 97%)    Constitutional: NARD; overweight  Eyes: Conjunctivae are clear, Sclera is an-icteric.  Ears Nose and throat: The external ears are normal appearing, Oral mucosa is pink and moist.  Respiratory: clear bilaterally.  Cardiovascular:  RRR, no MRG  GI: + tender to palpation and guarding epigastric area/hypogastrium. Abdomen is soft, symmetric, and without distention. There are no visible lesions or scars. Bowel sounds are present and normoactive in all four quadrants. No masses, hepatomegaly, or splenomegaly are noted.   Neuro: No Tremor, No involuntary movements  Skin: No rashes, No Jaundice.  Extremities:  No cyanosis or edema.      Data: (reviewed by attending)                        14.4   16.19 )-----------( 237      ( 21 Sep 2020 11:13 )             43.3     Hgb Trend:  14.4  09-21-20 @ 11:13  15.8  09-20-20 @ 20:44  Lipid Profile (09.21.20 @ 11:13)   Total Cholesterol/HDL Ratio Measurement: 3.7 Ratio   Cholesterol, Serum: 180 mg/dL   Triglycerides, Serum: 70 mg/dL   HDL Cholesterol, Serum: 49: HDL Levels >/= 60 mg/dL are considered beneficial and a "negative" risk   factor.   Effective 08/15/2018: New reference range and interpretive comment. mg/dL   Direct LDL: 119: LDL Cholesterol (mg/dL) -Lipid Profile (09.21.20 @ 11:13)   Total Cholesterol/HDL Ratio Measurement: 3.7 Ratio   Cholesterol, Serum: 180 mg/dL   Triglycerides, Serum: 70 mg/dL   HDL Cholesterol, Serum: 49: HDL Levels >/= 60 mg/dL are considered beneficial and a "negative" risk   factor.   Effective 08/15/2018: New reference range and interpretive comment. mg/dL   Direct LDL: 119: LDL Cholesterol (mg/dL) -    09-21    137  |  100  |  10  ----------------------------<  106<H>  4.0   |  29  |  0.8    Ca    8.9      21 Sep 2020 11:13  Mg     1.7     09-21    TPro  5.8<L>  /  Alb  3.8  /  TBili  0.9  /  DBili  x   /  AST  14  /  ALT  9   /  AlkPhos  49  09-21    Liver panel trend:  TBili 0.9   /   AST 14   /   ALT 9   /   AlkP 49   /   Tptn 5.8   /   Alb 3.8    /   DBili --      09-21  TBili 0.5   /   AST 19   /   ALT 14   /   AlkP 66   /   Tptn 7.1   /   Alb 4.6    /   DBili <0.2      09-20      PT/INR - ( 20 Sep 2020 20:59 )   PT: 12.40 sec;   INR: 1.08 ratio         PTT - ( 20 Sep 2020 20:59 )  PTT:28.8 sec        Radiology:(reviewed by attending)  CT Abdomen and Pelvis w/ IV Cont:   EXAM:  CT ABDOMEN AND PELVIS IC            PROCEDURE DATE:  09/20/2020            INTERPRETATION:  CLINICAL STATEMENT: Periumbilical pain. History of hernia repair.    TECHNIQUE: Contiguous axial CT images were obtained from the lower chest to the pubic symphysis following administration of 100cc intravenous contrast.  Oral contrast was not administered.  Reformatted images in the coronal and sagittal planes were acquired.    COMPARISON CT: None.      FINDINGS:    LOWER CHEST: Trace bilateral dependent atelectasis.    HEPATOBILIARY: Unremarkable.    SPLEEN: Unremarkable.    PANCREAS: There is diffuse pancreatic edema with peripancreatic inflammation and fluid. Focal ductal dilatation in the pancreatic head to 7 mm. 2.2 x 1.5 cm cystic focusis in the uncinate process.    ADRENAL GLANDS: Indeterminate 1.2 cm left adrenal gland nodule..    KIDNEYS: Symmetric renal enhancement. No hydronephrosis. Small left parapelvic cysts.    ABDOMINOPELVIC NODES: Prominent but subcentimeter peripancreatic lymph nodes, likely reactive.    PELVIC ORGANS: Unremarkable.    PERITONEUM/MESENTERY/BOWEL: No evidence of bowel obstruction or pneumoperitoneum. Normal appendix.    BONES/SOFT TISSUES: No acute osseous abnormality. Degenerative changes of the spine. L1 limbus vertebra. Postumbilical hernia repair with adjacent 2 cm periumbilical fat-containing hernia. Small bilateral fat-containing inguinal hernias also seen.    OTHER: Normal caliber aorta.      IMPRESSION:    Acute pancreatitis. 2.2 x 1.5 cmcystic focus in the uncinate process favored to reflect dilated side branches rather than acute pancreatic collection. Nonspecific focal dilatation of the main pancreatic duct to 7 mm in the pancreatic head without upstream atrophy. Suggest follow-upshort interval pancreatic MRI once acute pathology resolves.    1.2 cm indeterminate left adrenal gland nodule can be evaluated on the aforementioned suggested pancreatic MRI        US Abdomen Limited:   EXAM:  US ABDOMEN LIMITED        PROCEDURE DATE:  09/21/2020      INTERPRETATION:  CLINICAL INFORMATION: Right upper quadrant abdominal pain.    COMPARISON: None available.    TECHNIQUE: Sonography of the right upper quadrant.    FINDINGS:    Liver: Within normal limits.  Bile ducts: Normal caliber. Common bile duct measures 4 mm.  Gallbladder: Trace sludge. No evidence of cholelithiasis.  Pancreas: Edematous, and poorly visualized.  Right kidney: 11.0 cm. No hydronephrosis.  Ascites: None.  IVC: Visualized portions are within normal limits.    IMPRESSION:    Edematous pancreas compatible with acute pancreatitis, though poorly visualized due to overlying bowel gas.    Trace gallbladder sludge without evidence of cholelithiasis.

## 2020-09-21 NOTE — CONSULT NOTE ADULT - SUBJECTIVE AND OBJECTIVE BOX
Gastroenterology progress note:     Patient is a 55y old  Male who presents with a chief complaint of Epigastric pain (21 Sep 2020 03:31)       Admitted on: 09-21-20    We are following the patient for: Acute pancreatitis    Interval History: Patient reports that this is his 3rd episode of similar abdominal pain in the past month. First episode was 1 month ago with severe pain but pt thought it was food poisoning and it eventually passed with conservative management staying at home. 2nd episode was about 1-2 weeks later and milder in nature. Pt was able to go to work despite the pain. Pt does not remember any specific foods or triggers during those episodes. Yesterday he developed severe epigastric pain after eating a protein bar. Pain was sudden in onset, continuos, located in epigastrium, worsening gradually to 10/10 intensity, non-radiating associated with nausea and 3 episodes of NBNB vomiting. Patient reports no relieving factor but endorses that food/fluid intake did make it worse. Patient denies, headache, dizziness, chest pain, SOB, constipation, diarrhea, or any alcohol intake.    Of note, pt reports that his diet has been very poor up until 1 month ago after 1st episode when he started eating more carefully. Reports high intake of fried food, meat, and chips. Pt also has Hx of heavy alcohol abuse, s/p rehab with no alcohol past 30 years. Pt also has Hx of unspecified drug use, including "one or two times" IVDU, but has also not used any drugs past 30 years. Pt states he was tested for Hepatitis and HIV when he went to rehab. Pt also has ~30 pack-year smoking Hx, quit one year ago.      Patient's medical problems are stable  Prior records reviewed: Y  History obtained from someone other than patient: N      PAST MEDICAL & SURGICAL HISTORY:  s/p incarcerated supraumbilical ventral hernia repair with mesh, 2018    MEDICATIONS  (STANDING):  bisacodyl 5 milliGRAM(s) Oral at bedtime  chlorhexidine 2% Cloths 1 Application(s) Topical once  enoxaparin Injectable 40 milliGRAM(s) SubCutaneous at bedtime  lactated ringers. 1000 milliLiter(s) (250 mL/Hr) IV Continuous <Continuous>  multivitamin 1 Tablet(s) Oral daily  senna 2 Tablet(s) Oral at bedtime      MEDICATIONS  (PRN):  acetaminophen   Tablet .. 650 milliGRAM(s) Oral every 6 hours PRN Mild Pain (1 - 3)  morphine  - Injectable 2 milliGRAM(s) IV Push every 2 hours PRN Severe Pain (7 - 10)  ondansetron Injectable 4 milliGRAM(s) IV Push every 6 hours PRN Nausea      Allergies  No Known Allergies      Review of Systems:   Constitutional:  No Fever, No Chills  ENT/Mouth:  No Hearing Changes,  No Difficulty Swallowing  Eyes:  No Eye Pain, No Vision Changes  Cardiovascular:  No Chest Pain, No Palpitations  Respiratory:  No Cough, No Dyspnea  Gastrointestinal:  As described in HPI  Musculoskeletal:  No Joint Swelling, No Back Pain  Skin:  No Skin Lesions, No Jaundice  Neuro:  No Syncope, No Dizziness  Heme/Lymph:  No Bruising, No Bleeding.      Physical Examination:  T(C): 36.7 (09-21-20 @ 08:00), Max: 36.8 (09-20-20 @ 20:25)  HR: 83 (09-21-20 @ 08:00) (81 - 83)  BP: 106/55 (09-21-20 @ 08:00) (106/55 - 129/67)  RR: 18 (09-21-20 @ 08:00) (18 - 18)  SpO2: 97% (09-21-20 @ 08:00) (97% - 98%)  Weight (kg): 88.5 (09-20-20 @ 20:25)      Constitutional: No acute distress at time of interview  Eyes: Conjunctivae are clear, Sclera is non-icteric.  Ears Nose and throat: The external ears are normal appearing, Oral mucosa is pink and moist.  Respiratory:  No signs of respiratory distress. Lung sounds are clear bilaterally.  Cardiovascular:  S1 S2, Regular rate and rhythm.  GI: + tender to palpation and guarding epigastric area/hypogastrium. Abdomen is soft, symmetric, and without distention. There are no visible lesions or scars. Bowel sounds are present and normoactive in all four quadrants. No masses, hepatomegaly, or splenomegaly are noted.   Neuro: No Tremor, No involuntary movements  Skin: No rashes, No Jaundice.  Extremities:  No cyanosis or edema.         Data: (reviewed by attending)                        15.8   15.84 )-----------( 280      ( 20 Sep 2020 20:44 )             46.7     Hgb trend:  15.8  09-20-20 @ 20:44      09-20    134<L>  |  96<L>  |  21<H>  ----------------------------<  156<H>  3.8   |  27  |  0.9    Ca    9.3      20 Sep 2020 20:44    TPro  7.1  /  Alb  4.6  /  TBili  0.5  /  DBili  <0.2  /  AST  19  /  ALT  14  /  AlkPhos  66  09-20    Liver panel trend:  TBili 0.5   /   AST 19   /   ALT 14   /   AlkP 66   /   Tptn 7.1   /   Alb 4.6    /   DBili <0.2      09-20      PT/INR - ( 20 Sep 2020 20:59 )   PT: 12.40 sec;   INR: 1.08 ratio         PTT - ( 20 Sep 2020 20:59 )  PTT:28.8 sec       Radiology: (reviewed by attending)  CT Abdomen and Pelvis w/ IV Cont:   EXAM:  CT ABDOMEN AND PELVIS IC            PROCEDURE DATE:  09/20/2020      INTERPRETATION:  CLINICAL STATEMENT: Periumbilical pain. History of hernia repair.    TECHNIQUE: Contiguous axial CT images were obtained from the lower chest to the pubic symphysis following administration of 100cc intravenous contrast.  Oral contrast was not administered.  Reformatted images in the coronal and sagittal planes were acquired.    COMPARISON CT: None.      FINDINGS:    LOWER CHEST: Trace bilateral dependent atelectasis.    HEPATOBILIARY: Unremarkable.    SPLEEN: Unremarkable.    PANCREAS: There is diffuse pancreatic edema with peripancreatic inflammation and fluid. Focal ductal dilatation in the pancreatic head to 7 mm. 2.2 x 1.5 cm cystic focusis in the uncinate process.    ADRENAL GLANDS: Indeterminate 1.2 cm left adrenal gland nodule..    KIDNEYS: Symmetric renal enhancement. No hydronephrosis. Small left parapelvic cysts.    ABDOMINOPELVIC NODES: Prominent but subcentimeter peripancreatic lymph nodes, likely reactive.    PELVIC ORGANS: Unremarkable.    PERITONEUM/MESENTERY/BOWEL: No evidence of bowel obstruction or pneumoperitoneum. Normal appendix.    BONES/SOFT TISSUES: No acute osseous abnormality. Degenerative changes of the spine. L1 limbus vertebra. Postumbilical hernia repair with adjacent 2 cm periumbilical fat-containing hernia. Small bilateral fat-containing inguinal hernias also seen.    OTHER: Normal caliber aorta.      IMPRESSION:    Acute pancreatitis. 2.2 x 1.5 cm cystic focus in the uncinate process favored to reflect dilated side branches rather than acute pancreatic collection. Nonspecific focal dilatation of the main pancreatic duct to 7 mm in the pancreatic head without upstream atrophy. Suggest follow-up short interval pancreatic MRI once acute pathology resolves.    1.2 cm indeterminate left adrenal gland nodule can be evaluated on the aforementioned suggested pancreatic MRI        US Abdomen Limited:   EXAM:  US ABDOMEN LIMITED          PROCEDURE DATE:  09/21/2020      INTERPRETATION:  CLINICAL INFORMATION: Right upper quadrant abdominal pain.    COMPARISON: None available.    TECHNIQUE: Sonography of the right upper quadrant.    FINDINGS:    Liver: Within normal limits.  Bile ducts: Normal caliber. Common bile duct measures 4 mm.  Gallbladder: Trace sludge. No evidence of cholelithiasis.  Pancreas: Edematous, and poorly visualized.  Right kidney: 11.0 cm. No hydronephrosis.  Ascites: None.  IVC: Visualized portions are within normal limits.    IMPRESSION:    Edematous pancreas compatible with acute pancreatitis, though poorly visualized due to overlying bowel gas.    Trace gallbladder sludge without evidence of cholelithiasis.        Assessment and Plan:    Acute pancreatitis x3 in past month    # Acute pancreatitis x3 in past month  - continue goal directed aggressive fluid resuscitation  - monitor pain and f/u 11AM BUN/hematocrit   - MRCP/EUS as o/p       Attending recs to follow           Gastroenterology progress note:     Patient is a 55y old  Male who presents with a chief complaint of Epigastric pain (21 Sep 2020 03:31)       Admitted on: 09-21-20    We are following the patient for: Acute pancreatitis    Interval History: Patient reports that this is his 3rd episode of similar abdominal pain in the past month. First episode was 1 month ago with severe pain but pt thought it was food poisoning and it eventually passed with conservative management staying at home. 2nd episode was about 1-2 weeks later and milder in nature. Pt was able to go to work despite the pain. Pt does not remember any specific foods or triggers during those episodes. Yesterday he developed severe epigastric pain after eating a protein bar. Pain was sudden in onset, continuos, located in epigastrium, worsening gradually to 10/10 intensity, non-radiating associated with nausea and 3 episodes of NBNB vomiting. Patient reports no relieving factor but endorses that food/fluid intake did make it worse. Patient denies, headache, dizziness, chest pain, SOB, constipation, diarrhea, or any alcohol intake.    Of note, pt reports that his diet has been very poor up until 1 month ago after 1st episode when he started eating more carefully. Reports high intake of fried food, meat, and chips. Pt also has Hx of heavy alcohol abuse, s/p rehab with no alcohol past 30 years. Pt also has Hx of unspecified drug use, including "one or two times" IVDU, but has also not used any drugs past 30 years. Pt states he was tested for Hepatitis and HIV when he went to rehab. Pt also has ~30 pack-year smoking Hx, quit one year ago.  Pt also reports that his dad did have some problems with his pancreas when he was young and had to have "a lot of bowel removed". Further Hx is unclear.      Patient's medical problems are stable  Prior records reviewed: Y  History obtained from someone other than patient: N      PAST MEDICAL & SURGICAL HISTORY:  s/p incarcerated supraumbilical ventral hernia repair with mesh, 2018    MEDICATIONS  (STANDING):  bisacodyl 5 milliGRAM(s) Oral at bedtime  chlorhexidine 2% Cloths 1 Application(s) Topical once  enoxaparin Injectable 40 milliGRAM(s) SubCutaneous at bedtime  lactated ringers. 1000 milliLiter(s) (250 mL/Hr) IV Continuous <Continuous>  multivitamin 1 Tablet(s) Oral daily  senna 2 Tablet(s) Oral at bedtime      MEDICATIONS  (PRN):  acetaminophen   Tablet .. 650 milliGRAM(s) Oral every 6 hours PRN Mild Pain (1 - 3)  morphine  - Injectable 2 milliGRAM(s) IV Push every 2 hours PRN Severe Pain (7 - 10)  ondansetron Injectable 4 milliGRAM(s) IV Push every 6 hours PRN Nausea      Allergies  No Known Allergies      Review of Systems:   Constitutional:  No Fever, No Chills  ENT/Mouth:  No Hearing Changes,  No Difficulty Swallowing  Eyes:  No Eye Pain, No Vision Changes  Cardiovascular:  No Chest Pain, No Palpitations  Respiratory:  No Cough, No Dyspnea  Gastrointestinal:  As described in HPI  Musculoskeletal:  No Joint Swelling, No Back Pain  Skin:  No Skin Lesions, No Jaundice  Neuro:  No Syncope, No Dizziness  Heme/Lymph:  No Bruising, No Bleeding.      Physical Examination:  T(C): 36.7 (09-21-20 @ 08:00), Max: 36.8 (09-20-20 @ 20:25)  HR: 83 (09-21-20 @ 08:00) (81 - 83)  BP: 106/55 (09-21-20 @ 08:00) (106/55 - 129/67)  RR: 18 (09-21-20 @ 08:00) (18 - 18)  SpO2: 97% (09-21-20 @ 08:00) (97% - 98%)  Weight (kg): 88.5 (09-20-20 @ 20:25)      Constitutional: No acute distress at time of interview  Eyes: Conjunctivae are clear, Sclera is non-icteric.  Ears Nose and throat: The external ears are normal appearing, Oral mucosa is pink and moist.  Respiratory:  No signs of respiratory distress. Lung sounds are clear bilaterally.  Cardiovascular:  S1 S2, Regular rate and rhythm.  GI: + tender to palpation and guarding epigastric area/hypogastrium. Abdomen is soft, symmetric, and without distention. There are no visible lesions or scars. Bowel sounds are present and normoactive in all four quadrants. No masses, hepatomegaly, or splenomegaly are noted.   Neuro: No Tremor, No involuntary movements  Skin: No rashes, No Jaundice.  Extremities:  No cyanosis or edema.         Data: (reviewed by attending)                        15.8   15.84 )-----------( 280      ( 20 Sep 2020 20:44 )             46.7     Hgb trend:  15.8  09-20-20 @ 20:44      09-20    134<L>  |  96<L>  |  21<H>  ----------------------------<  156<H>  3.8   |  27  |  0.9    Ca    9.3      20 Sep 2020 20:44    TPro  7.1  /  Alb  4.6  /  TBili  0.5  /  DBili  <0.2  /  AST  19  /  ALT  14  /  AlkPhos  66  09-20    Liver panel trend:  TBili 0.5   /   AST 19   /   ALT 14   /   AlkP 66   /   Tptn 7.1   /   Alb 4.6    /   DBili <0.2      09-20      PT/INR - ( 20 Sep 2020 20:59 )   PT: 12.40 sec;   INR: 1.08 ratio         PTT - ( 20 Sep 2020 20:59 )  PTT:28.8 sec       Radiology: (reviewed by attending)  CT Abdomen and Pelvis w/ IV Cont:   EXAM:  CT ABDOMEN AND PELVIS IC            PROCEDURE DATE:  09/20/2020      INTERPRETATION:  CLINICAL STATEMENT: Periumbilical pain. History of hernia repair.    TECHNIQUE: Contiguous axial CT images were obtained from the lower chest to the pubic symphysis following administration of 100cc intravenous contrast.  Oral contrast was not administered.  Reformatted images in the coronal and sagittal planes were acquired.    COMPARISON CT: None.      FINDINGS:    LOWER CHEST: Trace bilateral dependent atelectasis.    HEPATOBILIARY: Unremarkable.    SPLEEN: Unremarkable.    PANCREAS: There is diffuse pancreatic edema with peripancreatic inflammation and fluid. Focal ductal dilatation in the pancreatic head to 7 mm. 2.2 x 1.5 cm cystic focusis in the uncinate process.    ADRENAL GLANDS: Indeterminate 1.2 cm left adrenal gland nodule..    KIDNEYS: Symmetric renal enhancement. No hydronephrosis. Small left parapelvic cysts.    ABDOMINOPELVIC NODES: Prominent but subcentimeter peripancreatic lymph nodes, likely reactive.    PELVIC ORGANS: Unremarkable.    PERITONEUM/MESENTERY/BOWEL: No evidence of bowel obstruction or pneumoperitoneum. Normal appendix.    BONES/SOFT TISSUES: No acute osseous abnormality. Degenerative changes of the spine. L1 limbus vertebra. Postumbilical hernia repair with adjacent 2 cm periumbilical fat-containing hernia. Small bilateral fat-containing inguinal hernias also seen.    OTHER: Normal caliber aorta.      IMPRESSION:    Acute pancreatitis. 2.2 x 1.5 cm cystic focus in the uncinate process favored to reflect dilated side branches rather than acute pancreatic collection. Nonspecific focal dilatation of the main pancreatic duct to 7 mm in the pancreatic head without upstream atrophy. Suggest follow-up short interval pancreatic MRI once acute pathology resolves.    1.2 cm indeterminate left adrenal gland nodule can be evaluated on the aforementioned suggested pancreatic MRI        US Abdomen Limited:   EXAM:  US ABDOMEN LIMITED          PROCEDURE DATE:  09/21/2020      INTERPRETATION:  CLINICAL INFORMATION: Right upper quadrant abdominal pain.    COMPARISON: None available.    TECHNIQUE: Sonography of the right upper quadrant.    FINDINGS:    Liver: Within normal limits.  Bile ducts: Normal caliber. Common bile duct measures 4 mm.  Gallbladder: Trace sludge. No evidence of cholelithiasis.  Pancreas: Edematous, and poorly visualized.  Right kidney: 11.0 cm. No hydronephrosis.  Ascites: None.  IVC: Visualized portions are within normal limits.    IMPRESSION:    Edematous pancreas compatible with acute pancreatitis, though poorly visualized due to overlying bowel gas.    Trace gallbladder sludge without evidence of cholelithiasis.        Assessment and Plan:    Acute pancreatitis x3 in past month    # Acute pancreatitis x3 in past month  - no clear etiology; no stones on imaging, pt denies alcohol use, no recent new medications, triglycerides and Ca wnl.  - continue goal directed aggressive fluid resuscitation, repeat Hct/BUN improvement noted   - pain control  - will need MRCP and MRI pancreas protocol as o/p       Attending recs to follow           Gastroenterology progress note:     Patient is a 55y old  Male who presents with a chief complaint of Epigastric pain (21 Sep 2020 03:31)       Admitted on: 09-21-20    We are following the patient for: Acute pancreatitis    Interval History: Pt is 56 y/o male with no significant PMHx, not on any home meds, presenting for acute epigastric abdominal pain. Patient reports that this is his 3rd episode of similar abdominal pain in the past month. First episode was 1 month ago with severe pain but pt thought it was food poisoning and it eventually passed with conservative management staying at home. 2nd episode was about 1-2 weeks later and milder in nature. Pt was able to go to work despite the pain. Pt does not remember any specific foods or triggers during those episodes. Yesterday he developed severe epigastric pain after eating a protein bar. Pain was sudden in onset, continuos, located in epigastrium, worsening gradually to 10/10 intensity, non-radiating associated with nausea and 3 episodes of NBNB vomiting. Patient reports no relieving factor but endorses that food/fluid intake did make it worse. Patient denies, headache, dizziness, chest pain, SOB, constipation, diarrhea, or any alcohol intake.    Of note, pt reports that his diet has been very poor up until 1 month ago after 1st episode when he started eating more carefully. Reports high intake of fried food, meat, and chips. Pt also has Hx of heavy alcohol abuse, s/p rehab with no alcohol past 30 years. Pt also has Hx of unspecified drug use, including "one or two times" IVDU, but has also not used any drugs past 30 years. Pt states he was tested for Hepatitis and HIV when he went to rehab. Pt also has ~30 pack-year smoking Hx, quit one year ago.  Pt also reports that his dad did have some problems with his pancreas when he was young and had to have "a lot of bowel removed". Further Hx is unclear.      Patient's medical problems are stable  Prior records reviewed: Y  History obtained from someone other than patient: N      PAST MEDICAL & SURGICAL HISTORY:  s/p incarcerated supraumbilical ventral hernia repair with mesh, 2018    MEDICATIONS  (STANDING):  bisacodyl 5 milliGRAM(s) Oral at bedtime  chlorhexidine 2% Cloths 1 Application(s) Topical once  enoxaparin Injectable 40 milliGRAM(s) SubCutaneous at bedtime  lactated ringers. 1000 milliLiter(s) (250 mL/Hr) IV Continuous <Continuous>  multivitamin 1 Tablet(s) Oral daily  senna 2 Tablet(s) Oral at bedtime      MEDICATIONS  (PRN):  acetaminophen   Tablet .. 650 milliGRAM(s) Oral every 6 hours PRN Mild Pain (1 - 3)  morphine  - Injectable 2 milliGRAM(s) IV Push every 2 hours PRN Severe Pain (7 - 10)  ondansetron Injectable 4 milliGRAM(s) IV Push every 6 hours PRN Nausea      Allergies  No Known Allergies      Review of Systems:   Constitutional:  No Fever, No Chills  ENT/Mouth:  No Hearing Changes,  No Difficulty Swallowing  Eyes:  No Eye Pain, No Vision Changes  Cardiovascular:  No Chest Pain, No Palpitations  Respiratory:  No Cough, No Dyspnea  Gastrointestinal:  As described in HPI  Musculoskeletal:  No Joint Swelling, No Back Pain  Skin:  No Skin Lesions, No Jaundice  Neuro:  No Syncope, No Dizziness  Heme/Lymph:  No Bruising, No Bleeding.      Physical Examination:  T(C): 36.7 (09-21-20 @ 08:00), Max: 36.8 (09-20-20 @ 20:25)  HR: 83 (09-21-20 @ 08:00) (81 - 83)  BP: 106/55 (09-21-20 @ 08:00) (106/55 - 129/67)  RR: 18 (09-21-20 @ 08:00) (18 - 18)  SpO2: 97% (09-21-20 @ 08:00) (97% - 98%)  Weight (kg): 88.5 (09-20-20 @ 20:25)      Constitutional: No acute distress at time of interview  Eyes: Conjunctivae are clear, Sclera is non-icteric.  Ears Nose and throat: The external ears are normal appearing, Oral mucosa is pink and moist.  Respiratory:  No signs of respiratory distress. Lung sounds are clear bilaterally.  Cardiovascular:  S1 S2, Regular rate and rhythm.  GI: + tender to palpation and guarding epigastric area/hypogastrium. Abdomen is soft, symmetric, and without distention. There are no visible lesions or scars. Bowel sounds are present and normoactive in all four quadrants. No masses, hepatomegaly, or splenomegaly are noted.   Neuro: No Tremor, No involuntary movements  Skin: No rashes, No Jaundice.  Extremities:  No cyanosis or edema.         Data: (reviewed by attending)                        15.8   15.84 )-----------( 280      ( 20 Sep 2020 20:44 )             46.7     Hgb trend:  15.8  09-20-20 @ 20:44      09-20    134<L>  |  96<L>  |  21<H>  ----------------------------<  156<H>  3.8   |  27  |  0.9    Ca    9.3      20 Sep 2020 20:44    TPro  7.1  /  Alb  4.6  /  TBili  0.5  /  DBili  <0.2  /  AST  19  /  ALT  14  /  AlkPhos  66  09-20    Liver panel trend:  TBili 0.5   /   AST 19   /   ALT 14   /   AlkP 66   /   Tptn 7.1   /   Alb 4.6    /   DBili <0.2      09-20      PT/INR - ( 20 Sep 2020 20:59 )   PT: 12.40 sec;   INR: 1.08 ratio         PTT - ( 20 Sep 2020 20:59 )  PTT:28.8 sec       Radiology: (reviewed by attending)  CT Abdomen and Pelvis w/ IV Cont:   EXAM:  CT ABDOMEN AND PELVIS IC            PROCEDURE DATE:  09/20/2020      INTERPRETATION:  CLINICAL STATEMENT: Periumbilical pain. History of hernia repair.    TECHNIQUE: Contiguous axial CT images were obtained from the lower chest to the pubic symphysis following administration of 100cc intravenous contrast.  Oral contrast was not administered.  Reformatted images in the coronal and sagittal planes were acquired.    COMPARISON CT: None.      FINDINGS:    LOWER CHEST: Trace bilateral dependent atelectasis.    HEPATOBILIARY: Unremarkable.    SPLEEN: Unremarkable.    PANCREAS: There is diffuse pancreatic edema with peripancreatic inflammation and fluid. Focal ductal dilatation in the pancreatic head to 7 mm. 2.2 x 1.5 cm cystic focusis in the uncinate process.    ADRENAL GLANDS: Indeterminate 1.2 cm left adrenal gland nodule..    KIDNEYS: Symmetric renal enhancement. No hydronephrosis. Small left parapelvic cysts.    ABDOMINOPELVIC NODES: Prominent but subcentimeter peripancreatic lymph nodes, likely reactive.    PELVIC ORGANS: Unremarkable.    PERITONEUM/MESENTERY/BOWEL: No evidence of bowel obstruction or pneumoperitoneum. Normal appendix.    BONES/SOFT TISSUES: No acute osseous abnormality. Degenerative changes of the spine. L1 limbus vertebra. Postumbilical hernia repair with adjacent 2 cm periumbilical fat-containing hernia. Small bilateral fat-containing inguinal hernias also seen.    OTHER: Normal caliber aorta.      IMPRESSION:    Acute pancreatitis. 2.2 x 1.5 cm cystic focus in the uncinate process favored to reflect dilated side branches rather than acute pancreatic collection. Nonspecific focal dilatation of the main pancreatic duct to 7 mm in the pancreatic head without upstream atrophy. Suggest follow-up short interval pancreatic MRI once acute pathology resolves.    1.2 cm indeterminate left adrenal gland nodule can be evaluated on the aforementioned suggested pancreatic MRI        US Abdomen Limited:   EXAM:  US ABDOMEN LIMITED          PROCEDURE DATE:  09/21/2020      INTERPRETATION:  CLINICAL INFORMATION: Right upper quadrant abdominal pain.    COMPARISON: None available.    TECHNIQUE: Sonography of the right upper quadrant.    FINDINGS:    Liver: Within normal limits.  Bile ducts: Normal caliber. Common bile duct measures 4 mm.  Gallbladder: Trace sludge. No evidence of cholelithiasis.  Pancreas: Edematous, and poorly visualized.  Right kidney: 11.0 cm. No hydronephrosis.  Ascites: None.  IVC: Visualized portions are within normal limits.    IMPRESSION:    Edematous pancreas compatible with acute pancreatitis, though poorly visualized due to overlying bowel gas.    Trace gallbladder sludge without evidence of cholelithiasis.                   Gastroenterology progress note:     Patient is a 55y old  Male who presents with a chief complaint of Epigastric pain (21 Sep 2020 03:31)       Admitted on: 09-21-20    We are following the patient for: Acute pancreatitis    GI HPI: Pt is 54 y/o male with no significant PMHx, not on any home meds, presenting for acute epigastric abdominal pain. Patient reports that this is his 3rd episode of similar abdominal pain in the past month. First episode was 1 month ago with severe pain but pt thought it was food poisoning and it eventually passed with conservative management staying at home. 2nd episode was about 1-2 weeks later and milder in nature. Pt was able to go to work despite the pain. Pt does not remember any specific foods or triggers during those episodes. Yesterday he developed severe epigastric pain after eating a protein bar. Pain was sudden in onset, continuos, located in epigastrium, worsening gradually to 10/10 intensity, non-radiating associated with nausea and 3 episodes of NBNB vomiting. Patient reports no relieving factor but endorses that food/fluid intake did make it worse. Patient denies, headache, dizziness, chest pain, SOB, constipation, diarrhea, or any alcohol intake.    Of note, pt reports that his diet has been very poor up until 1 month ago after 1st episode when he started eating more carefully. Reports high intake of fried food, meat, and chips. Pt also has Hx of heavy alcohol abuse, s/p rehab with no alcohol past 30 years. Pt also has Hx of unspecified drug use, including "one or two times" IVDU, but has also not used any drugs past 30 years. Pt states he was tested for Hepatitis and HIV when he went to rehab. Pt also has ~30 pack-year smoking Hx, quit one year ago.  Pt also reports that his dad did have some problems with his pancreas when he was young and had to have "a lot of bowel removed". Further Hx is unclear.      Patient's medical problems are stable  Prior records reviewed: Y  History obtained from someone other than patient: N      PAST MEDICAL & SURGICAL HISTORY:  s/p incarcerated supraumbilical ventral hernia repair with mesh, 2018    MEDICATIONS  (STANDING):  bisacodyl 5 milliGRAM(s) Oral at bedtime  chlorhexidine 2% Cloths 1 Application(s) Topical once  enoxaparin Injectable 40 milliGRAM(s) SubCutaneous at bedtime  lactated ringers. 1000 milliLiter(s) (250 mL/Hr) IV Continuous <Continuous>  multivitamin 1 Tablet(s) Oral daily  senna 2 Tablet(s) Oral at bedtime      MEDICATIONS  (PRN):  acetaminophen   Tablet .. 650 milliGRAM(s) Oral every 6 hours PRN Mild Pain (1 - 3)  morphine  - Injectable 2 milliGRAM(s) IV Push every 2 hours PRN Severe Pain (7 - 10)  ondansetron Injectable 4 milliGRAM(s) IV Push every 6 hours PRN Nausea      Allergies  No Known Allergies      Review of Systems:   Constitutional:  No Fever, No Chills  ENT/Mouth:  No Hearing Changes,  No Difficulty Swallowing  Eyes:  No Eye Pain, No Vision Changes  Cardiovascular:  No Chest Pain, No Palpitations  Respiratory:  No Cough, No Dyspnea  Gastrointestinal:  As described in HPI  Musculoskeletal:  No Joint Swelling, No Back Pain  Skin:  No Skin Lesions, No Jaundice  Neuro:  No Syncope, No Dizziness  Heme/Lymph:  No Bruising, No Bleeding.      Physical Examination:  T(C): 36.7 (09-21-20 @ 08:00), Max: 36.8 (09-20-20 @ 20:25)  HR: 83 (09-21-20 @ 08:00) (81 - 83)  BP: 106/55 (09-21-20 @ 08:00) (106/55 - 129/67)  RR: 18 (09-21-20 @ 08:00) (18 - 18)  SpO2: 97% (09-21-20 @ 08:00) (97% - 98%)  Weight (kg): 88.5 (09-20-20 @ 20:25)      Constitutional: No acute distress at time of interview  Eyes: Conjunctivae are clear, Sclera is non-icteric.  Ears Nose and throat: The external ears are normal appearing, Oral mucosa is pink and moist.  Respiratory:  No signs of respiratory distress. Lung sounds are clear bilaterally.  Cardiovascular:  S1 S2, Regular rate and rhythm.  GI: + tender to palpation and guarding epigastric area/hypogastrium. Abdomen is soft, symmetric, and without distention. There are no visible lesions or scars. Bowel sounds are present and normoactive in all four quadrants. No masses, hepatomegaly, or splenomegaly are noted.   Neuro: No Tremor, No involuntary movements  Skin: No rashes, No Jaundice.  Extremities:  No cyanosis or edema.         Data: (reviewed by attending)                        15.8   15.84 )-----------( 280      ( 20 Sep 2020 20:44 )             46.7     Hgb trend:  15.8  09-20-20 @ 20:44      09-20    134<L>  |  96<L>  |  21<H>  ----------------------------<  156<H>  3.8   |  27  |  0.9    Ca    9.3      20 Sep 2020 20:44    TPro  7.1  /  Alb  4.6  /  TBili  0.5  /  DBili  <0.2  /  AST  19  /  ALT  14  /  AlkPhos  66  09-20    Liver panel trend:  TBili 0.5   /   AST 19   /   ALT 14   /   AlkP 66   /   Tptn 7.1   /   Alb 4.6    /   DBili <0.2      09-20      PT/INR - ( 20 Sep 2020 20:59 )   PT: 12.40 sec;   INR: 1.08 ratio         PTT - ( 20 Sep 2020 20:59 )  PTT:28.8 sec       Radiology: (reviewed by attending)  CT Abdomen and Pelvis w/ IV Cont:   EXAM:  CT ABDOMEN AND PELVIS IC            PROCEDURE DATE:  09/20/2020      INTERPRETATION:  CLINICAL STATEMENT: Periumbilical pain. History of hernia repair.    TECHNIQUE: Contiguous axial CT images were obtained from the lower chest to the pubic symphysis following administration of 100cc intravenous contrast.  Oral contrast was not administered.  Reformatted images in the coronal and sagittal planes were acquired.    COMPARISON CT: None.      FINDINGS:    LOWER CHEST: Trace bilateral dependent atelectasis.    HEPATOBILIARY: Unremarkable.    SPLEEN: Unremarkable.    PANCREAS: There is diffuse pancreatic edema with peripancreatic inflammation and fluid. Focal ductal dilatation in the pancreatic head to 7 mm. 2.2 x 1.5 cm cystic focusis in the uncinate process.    ADRENAL GLANDS: Indeterminate 1.2 cm left adrenal gland nodule..    KIDNEYS: Symmetric renal enhancement. No hydronephrosis. Small left parapelvic cysts.    ABDOMINOPELVIC NODES: Prominent but subcentimeter peripancreatic lymph nodes, likely reactive.    PELVIC ORGANS: Unremarkable.    PERITONEUM/MESENTERY/BOWEL: No evidence of bowel obstruction or pneumoperitoneum. Normal appendix.    BONES/SOFT TISSUES: No acute osseous abnormality. Degenerative changes of the spine. L1 limbus vertebra. Postumbilical hernia repair with adjacent 2 cm periumbilical fat-containing hernia. Small bilateral fat-containing inguinal hernias also seen.    OTHER: Normal caliber aorta.      IMPRESSION:    Acute pancreatitis. 2.2 x 1.5 cm cystic focus in the uncinate process favored to reflect dilated side branches rather than acute pancreatic collection. Nonspecific focal dilatation of the main pancreatic duct to 7 mm in the pancreatic head without upstream atrophy. Suggest follow-up short interval pancreatic MRI once acute pathology resolves.    1.2 cm indeterminate left adrenal gland nodule can be evaluated on the aforementioned suggested pancreatic MRI        US Abdomen Limited:   EXAM:  US ABDOMEN LIMITED          PROCEDURE DATE:  09/21/2020      INTERPRETATION:  CLINICAL INFORMATION: Right upper quadrant abdominal pain.    COMPARISON: None available.    TECHNIQUE: Sonography of the right upper quadrant.    FINDINGS:    Liver: Within normal limits.  Bile ducts: Normal caliber. Common bile duct measures 4 mm.  Gallbladder: Trace sludge. No evidence of cholelithiasis.  Pancreas: Edematous, and poorly visualized.  Right kidney: 11.0 cm. No hydronephrosis.  Ascites: None.  IVC: Visualized portions are within normal limits.    IMPRESSION:    Edematous pancreas compatible with acute pancreatitis, though poorly visualized due to overlying bowel gas.    Trace gallbladder sludge without evidence of cholelithiasis.                   Gastroenterology Consultation:    Patient is a 55y old  Male who presents with a chief complaint of Epigastric pain (21 Sep 2020 09:26)  Admitted on: 09-21-20  HPI:  This is a 55 years old male with no significant past medical history who presented to ED with worsening epigastric pain. Patient was fine till this morning hen he developed severe e[pigastric pain after eating a protein bar. Pain was sudden in onset, continous, located in epigastrium  worsening gradually to 10/10 intensity, non-radiating associated with nausa and 3 episodes of NBNB vomiting. Patient reports no relieving factor but endorses that food intake made worse. Patient denies, headache, dizziness, chest pain, SOB, constipation, diarrha, alcohol intake.    In ED , patient's vitals were as follows     T(C): 36.8 (09-20-20 @ 20:25), Max: 36.8 (09-20-20 @ 20:25)  HR: 81 (09-20-20 @ 20:25) (81 - 81)  BP: 129/67 (09-20-20 @ 20:25) (129/67 - 129/67)  RR: 18 (09-20-20 @ 20:25) (18 - 18)  SpO2: 98% (09-20-20 @ 20:25) (98% - 98%)    His labs were significant for lipase of 1900 and CT abdomen showed acute inflammation of pancreatitis and US abdomen showed gall bladder sludge without evidence of cholelithiasis. Patient received 2.7L of IVF in ED.   (21 Sep 2020 03:31)      GI HPI: Pt is 56 y/o male with no significant PMHx, not on any home meds, presenting for acute epigastric abdominal pain. Patient reports that this is his 3rd episode of similar abdominal pain in the past month. First episode was 1 month ago with severe pain but pt thought it was food poisoning and it eventually passed with conservative management staying at home. 2nd episode was about 1-2 weeks later and milder in nature. Pt was able to go to work despite the pain. Pt does not remember any specific foods or triggers during those episodes. Yesterday he developed severe epigastric pain after eating a protein bar. Pain was sudden in onset, continuos, located in epigastrium, worsening gradually to 10/10 intensity, non-radiating associated with nausea and 3 episodes of NBNB vomiting. Patient reports no relieving factor but endorses that food/fluid intake did make it worse. Patient denies, headache, dizziness, chest pain, SOB, constipation, diarrhea, or any alcohol intake.    Of note, pt reports that his diet has been very poor up until 1 month ago after 1st episode when he started eating more carefully. Reports high intake of fried food, meat, and chips. Pt also has Hx of heavy alcohol abuse, s/p rehab with no alcohol past 30 years. Pt also has Hx of unspecified drug use, including "one or two times" IVDU, but has also not used any drugs past 30 years. Pt states he was tested for Hepatitis and HIV when he went to rehab. Pt also has ~30 pack-year smoking Hx, quit one year ago. Pt also reports that his dad did have some problems with his pancreas when he was young and had to have "a lot of bowel removed". Further Hx is unclear.      Prior records Reviewed: Y  History obtained from person other than patient: N    Prior EGD: none  Prior Colonoscopy: none      PAST MEDICAL & SURGICAL HISTORY:    no pertinent medical Hx    s/p incarcerated supraumbilical ventral hernia repair with mesh, 2018    FAMILY HISTORY:    Dad: DM, stroke, "Pancreas problems" that required bowel resection    Social History:  Tobacco: 30 pack-year Hx. quit 1 year ago  Alcohol: heavy past alcohol use, quit 30 years ago  Drugs: Hx of unspecified drug use, including "one or two times" IVDU, quit 30 years ago    Home Medications:    MEDICATIONS  (STANDING):  bisacodyl 5 milliGRAM(s) Oral at bedtime  chlorhexidine 2% Cloths 1 Application(s) Topical once  enoxaparin Injectable 40 milliGRAM(s) SubCutaneous at bedtime  lactated ringers. 1000 milliLiter(s) (250 mL/Hr) IV Continuous <Continuous>  multivitamin 1 Tablet(s) Oral daily  senna 2 Tablet(s) Oral at bedtime    MEDICATIONS  (PRN):  acetaminophen   Tablet .. 650 milliGRAM(s) Oral every 6 hours PRN Mild Pain (1 - 3)  morphine  - Injectable 4 milliGRAM(s) IV Push every 4 hours PRN Moderate Pain (4 - 6)  ondansetron Injectable 4 milliGRAM(s) IV Push every 6 hours PRN Nausea      Allergies  No Known Allergies      Review of Systems:   Constitutional:  No Fever, No Chills  ENT/Mouth:  No Hearing Changes,  No Difficulty Swallowing  Eyes:  No Eye Pain, No Vision Changes  Cardiovascular:  No Chest Pain, No Palpitations  Respiratory:  No Cough, No Dyspnea  Gastrointestinal:  As described in HPI  Musculoskeletal:  No Joint Swelling, No Back Pain  Skin:  No Skin Lesions, No Jaundice  Neuro:  No Syncope, No Dizziness  Heme/Lymph:  No Bruising, No Bleeding.      Physical Examination:  T(C): 36.7 (09-21-20 @ 08:00), Max: 36.8 (09-20-20 @ 20:25)  HR: 83 (09-21-20 @ 08:00) (81 - 83)  BP: 106/55 (09-21-20 @ 08:00) (106/55 - 129/67)  RR: 18 (09-21-20 @ 08:00) (18 - 18)  SpO2: 97% (09-21-20 @ 08:00) (97% - 98%)    Weight (kg): 88.5 (09-20-20 @ 20:25)      Constitutional: No acute distress at time of interview  Eyes: Conjunctivae are clear, Sclera is non-icteric.  Ears Nose and throat: The external ears are normal appearing, Oral mucosa is pink and moist.  Respiratory:  No signs of respiratory distress. Lung sounds are clear bilaterally.  Cardiovascular:  S1 S2, Regular rate and rhythm.  GI: + tender to palpation and guarding epigastric area/hypogastrium. Abdomen is soft, symmetric, and without distention. There are no visible lesions or scars. Bowel sounds are present and normoactive in all four quadrants. No masses, hepatomegaly, or splenomegaly are noted.   Neuro: No Tremor, No involuntary movements  Skin: No rashes, No Jaundice.  Extremities:  No cyanosis or edema.      Data: (reviewed by attending)                        14.4   16.19 )-----------( 237      ( 21 Sep 2020 11:13 )             43.3     Hgb Trend:  14.4  09-21-20 @ 11:13  15.8  09-20-20 @ 20:44      09-21    137  |  100  |  10  ----------------------------<  106<H>  4.0   |  29  |  0.8    Ca    8.9      21 Sep 2020 11:13  Mg     1.7     09-21    TPro  5.8<L>  /  Alb  3.8  /  TBili  0.9  /  DBili  x   /  AST  14  /  ALT  9   /  AlkPhos  49  09-21    Liver panel trend:  TBili 0.9   /   AST 14   /   ALT 9   /   AlkP 49   /   Tptn 5.8   /   Alb 3.8    /   DBili --      09-21  TBili 0.5   /   AST 19   /   ALT 14   /   AlkP 66   /   Tptn 7.1   /   Alb 4.6    /   DBili <0.2      09-20      PT/INR - ( 20 Sep 2020 20:59 )   PT: 12.40 sec;   INR: 1.08 ratio         PTT - ( 20 Sep 2020 20:59 )  PTT:28.8 sec        Radiology:(reviewed by attending)  CT Abdomen and Pelvis w/ IV Cont:   EXAM:  CT ABDOMEN AND PELVIS IC            PROCEDURE DATE:  09/20/2020            INTERPRETATION:  CLINICAL STATEMENT: Periumbilical pain. History of hernia repair.    TECHNIQUE: Contiguous axial CT images were obtained from the lower chest to the pubic symphysis following administration of 100cc intravenous contrast.  Oral contrast was not administered.  Reformatted images in the coronal and sagittal planes were acquired.    COMPARISON CT: None.      FINDINGS:    LOWER CHEST: Trace bilateral dependent atelectasis.    HEPATOBILIARY: Unremarkable.    SPLEEN: Unremarkable.    PANCREAS: There is diffuse pancreatic edema with peripancreatic inflammation and fluid. Focal ductal dilatation in the pancreatic head to 7 mm. 2.2 x 1.5 cm cystic focusis in the uncinate process.    ADRENAL GLANDS: Indeterminate 1.2 cm left adrenal gland nodule..    KIDNEYS: Symmetric renal enhancement. No hydronephrosis. Small left parapelvic cysts.    ABDOMINOPELVIC NODES: Prominent but subcentimeter peripancreatic lymph nodes, likely reactive.    PELVIC ORGANS: Unremarkable.    PERITONEUM/MESENTERY/BOWEL: No evidence of bowel obstruction or pneumoperitoneum. Normal appendix.    BONES/SOFT TISSUES: No acute osseous abnormality. Degenerative changes of the spine. L1 limbus vertebra. Postumbilical hernia repair with adjacent 2 cm periumbilical fat-containing hernia. Small bilateral fat-containing inguinal hernias also seen.    OTHER: Normal caliber aorta.      IMPRESSION:    Acute pancreatitis. 2.2 x 1.5 cmcystic focus in the uncinate process favored to reflect dilated side branches rather than acute pancreatic collection. Nonspecific focal dilatation of the main pancreatic duct to 7 mm in the pancreatic head without upstream atrophy. Suggest follow-upshort interval pancreatic MRI once acute pathology resolves.    1.2 cm indeterminate left adrenal gland nodule can be evaluated on the aforementioned suggested pancreatic MRI        US Abdomen Limited:   EXAM:  US ABDOMEN LIMITED        PROCEDURE DATE:  09/21/2020      INTERPRETATION:  CLINICAL INFORMATION: Right upper quadrant abdominal pain.    COMPARISON: None available.    TECHNIQUE: Sonography of the right upper quadrant.    FINDINGS:    Liver: Within normal limits.  Bile ducts: Normal caliber. Common bile duct measures 4 mm.  Gallbladder: Trace sludge. No evidence of cholelithiasis.  Pancreas: Edematous, and poorly visualized.  Right kidney: 11.0 cm. No hydronephrosis.  Ascites: None.  IVC: Visualized portions are within normal limits.    IMPRESSION:    Edematous pancreas compatible with acute pancreatitis, though poorly visualized due to overlying bowel gas.    Trace gallbladder sludge without evidence of cholelithiasis.

## 2020-09-21 NOTE — CONSULT NOTE ADULT - ATTENDING COMMENTS
Patient with acute pancreatitis of unclear etiology. Ct scan shows side branch IPMT. Will need further work up once acute episode subsides.

## 2020-09-21 NOTE — H&P ADULT - NSHPLABSRESULTS_GEN_ALL_CORE
< from: CT Abdomen and Pelvis w/ IV Cont (09.20.20 @ 21:49) >      IMPRESSION:    Acute pancreatitis. 2.2 x 1.5 cmcystic focus in the uncinate process favored to reflect dilated side branches rather than acute pancreatic collection. Nonspecific focal dilatation of the main pancreatic duct to 7 mm in the pancreatic head without upstream atrophy. Suggest follow-upshort interval pancreatic MRI once acute pathology resolves.    1.2 cm indeterminate left adrenal gland nodule can be evaluated on the aforementioned suggested pancreatic MRI      < end of copied text >    < from: US Abdomen Limited (09.21.20 @ 00:36) >      IMPRESSION:    Edematous pancreas compatible with acute pancreatitis, though poorly visualized due to overlying bowel gas.    Trace gallbladder sludge without evidence of cholelithiasis.      < end of copied text >

## 2020-09-22 LAB
ALBUMIN SERPL ELPH-MCNC: 3.2 G/DL — LOW (ref 3.5–5.2)
ALP SERPL-CCNC: 48 U/L — SIGNIFICANT CHANGE UP (ref 30–115)
ALT FLD-CCNC: 7 U/L — SIGNIFICANT CHANGE UP (ref 0–41)
ANION GAP SERPL CALC-SCNC: 9 MMOL/L — SIGNIFICANT CHANGE UP (ref 7–14)
AST SERPL-CCNC: 14 U/L — SIGNIFICANT CHANGE UP (ref 0–41)
BASOPHILS # BLD AUTO: 0.03 K/UL — SIGNIFICANT CHANGE UP (ref 0–0.2)
BASOPHILS NFR BLD AUTO: 0.2 % — SIGNIFICANT CHANGE UP (ref 0–1)
BILIRUB SERPL-MCNC: 0.9 MG/DL — SIGNIFICANT CHANGE UP (ref 0.2–1.2)
BUN SERPL-MCNC: 12 MG/DL — SIGNIFICANT CHANGE UP (ref 10–20)
CALCIUM SERPL-MCNC: 8.6 MG/DL — SIGNIFICANT CHANGE UP (ref 8.5–10.1)
CHLORIDE SERPL-SCNC: 101 MMOL/L — SIGNIFICANT CHANGE UP (ref 98–110)
CO2 SERPL-SCNC: 28 MMOL/L — SIGNIFICANT CHANGE UP (ref 17–32)
CREAT SERPL-MCNC: 0.7 MG/DL — SIGNIFICANT CHANGE UP (ref 0.7–1.5)
CULTURE RESULTS: SIGNIFICANT CHANGE UP
EOSINOPHIL # BLD AUTO: 0.16 K/UL — SIGNIFICANT CHANGE UP (ref 0–0.7)
EOSINOPHIL NFR BLD AUTO: 1.1 % — SIGNIFICANT CHANGE UP (ref 0–8)
GLUCOSE SERPL-MCNC: 81 MG/DL — SIGNIFICANT CHANGE UP (ref 70–99)
HCT VFR BLD CALC: 38.1 % — LOW (ref 42–52)
HGB BLD-MCNC: 12.8 G/DL — LOW (ref 14–18)
IMM GRANULOCYTES NFR BLD AUTO: 0.4 % — HIGH (ref 0.1–0.3)
LYMPHOCYTES # BLD AUTO: 1.48 K/UL — SIGNIFICANT CHANGE UP (ref 1.2–3.4)
LYMPHOCYTES # BLD AUTO: 10.4 % — LOW (ref 20.5–51.1)
MAGNESIUM SERPL-MCNC: 1.7 MG/DL — LOW (ref 1.8–2.4)
MCHC RBC-ENTMCNC: 30.2 PG — SIGNIFICANT CHANGE UP (ref 27–31)
MCHC RBC-ENTMCNC: 33.6 G/DL — SIGNIFICANT CHANGE UP (ref 32–37)
MCV RBC AUTO: 89.9 FL — SIGNIFICANT CHANGE UP (ref 80–94)
MONOCYTES # BLD AUTO: 1.23 K/UL — HIGH (ref 0.1–0.6)
MONOCYTES NFR BLD AUTO: 8.6 % — SIGNIFICANT CHANGE UP (ref 1.7–9.3)
NEUTROPHILS # BLD AUTO: 11.3 K/UL — HIGH (ref 1.4–6.5)
NEUTROPHILS NFR BLD AUTO: 79.3 % — HIGH (ref 42.2–75.2)
NRBC # BLD: 0 /100 WBCS — SIGNIFICANT CHANGE UP (ref 0–0)
PLATELET # BLD AUTO: 216 K/UL — SIGNIFICANT CHANGE UP (ref 130–400)
POTASSIUM SERPL-MCNC: 3.9 MMOL/L — SIGNIFICANT CHANGE UP (ref 3.5–5)
POTASSIUM SERPL-SCNC: 3.9 MMOL/L — SIGNIFICANT CHANGE UP (ref 3.5–5)
PROT SERPL-MCNC: 5.2 G/DL — LOW (ref 6–8)
RBC # BLD: 4.24 M/UL — LOW (ref 4.7–6.1)
RBC # FLD: 12.8 % — SIGNIFICANT CHANGE UP (ref 11.5–14.5)
SODIUM SERPL-SCNC: 138 MMOL/L — SIGNIFICANT CHANGE UP (ref 135–146)
SPECIMEN SOURCE: SIGNIFICANT CHANGE UP
WBC # BLD: 14.26 K/UL — HIGH (ref 4.8–10.8)
WBC # FLD AUTO: 14.26 K/UL — HIGH (ref 4.8–10.8)

## 2020-09-22 PROCEDURE — 99233 SBSQ HOSP IP/OBS HIGH 50: CPT

## 2020-09-22 RX ORDER — FOLIC ACID 0.8 MG
1 TABLET ORAL DAILY
Refills: 0 | Status: DISCONTINUED | OUTPATIENT
Start: 2020-09-22 | End: 2020-09-23

## 2020-09-22 RX ORDER — MAGNESIUM SULFATE 500 MG/ML
2 VIAL (ML) INJECTION
Refills: 0 | Status: DISCONTINUED | OUTPATIENT
Start: 2020-09-22 | End: 2020-09-22

## 2020-09-22 RX ORDER — MAGNESIUM SULFATE 500 MG/ML
2 VIAL (ML) INJECTION ONCE
Refills: 0 | Status: COMPLETED | OUTPATIENT
Start: 2020-09-22 | End: 2020-09-22

## 2020-09-22 RX ORDER — THIAMINE MONONITRATE (VIT B1) 100 MG
100 TABLET ORAL DAILY
Refills: 0 | Status: DISCONTINUED | OUTPATIENT
Start: 2020-09-22 | End: 2020-09-23

## 2020-09-22 RX ADMIN — SODIUM CHLORIDE 250 MILLILITER(S): 9 INJECTION, SOLUTION INTRAVENOUS at 12:23

## 2020-09-22 RX ADMIN — Medication 25 GRAM(S): at 08:28

## 2020-09-22 RX ADMIN — MORPHINE SULFATE 4 MILLIGRAM(S): 50 CAPSULE, EXTENDED RELEASE ORAL at 12:36

## 2020-09-22 RX ADMIN — MORPHINE SULFATE 4 MILLIGRAM(S): 50 CAPSULE, EXTENDED RELEASE ORAL at 12:26

## 2020-09-22 RX ADMIN — MORPHINE SULFATE 4 MILLIGRAM(S): 50 CAPSULE, EXTENDED RELEASE ORAL at 07:46

## 2020-09-22 RX ADMIN — SENNA PLUS 2 TABLET(S): 8.6 TABLET ORAL at 21:12

## 2020-09-22 RX ADMIN — SODIUM CHLORIDE 250 MILLILITER(S): 9 INJECTION, SOLUTION INTRAVENOUS at 07:48

## 2020-09-22 RX ADMIN — MORPHINE SULFATE 4 MILLIGRAM(S): 50 CAPSULE, EXTENDED RELEASE ORAL at 17:15

## 2020-09-22 RX ADMIN — SODIUM CHLORIDE 250 MILLILITER(S): 9 INJECTION, SOLUTION INTRAVENOUS at 04:01

## 2020-09-22 RX ADMIN — MORPHINE SULFATE 4 MILLIGRAM(S): 50 CAPSULE, EXTENDED RELEASE ORAL at 20:56

## 2020-09-22 RX ADMIN — MORPHINE SULFATE 4 MILLIGRAM(S): 50 CAPSULE, EXTENDED RELEASE ORAL at 01:46

## 2020-09-22 RX ADMIN — Medication 1 MILLIGRAM(S): at 12:23

## 2020-09-22 RX ADMIN — SODIUM CHLORIDE 250 MILLILITER(S): 9 INJECTION, SOLUTION INTRAVENOUS at 20:08

## 2020-09-22 RX ADMIN — Medication 1 TABLET(S): at 12:23

## 2020-09-22 RX ADMIN — Medication 100 MILLIGRAM(S): at 12:23

## 2020-09-22 RX ADMIN — Medication 5 MILLIGRAM(S): at 21:11

## 2020-09-22 RX ADMIN — SODIUM CHLORIDE 250 MILLILITER(S): 9 INJECTION, SOLUTION INTRAVENOUS at 00:08

## 2020-09-22 RX ADMIN — MORPHINE SULFATE 4 MILLIGRAM(S): 50 CAPSULE, EXTENDED RELEASE ORAL at 22:30

## 2020-09-22 RX ADMIN — MORPHINE SULFATE 4 MILLIGRAM(S): 50 CAPSULE, EXTENDED RELEASE ORAL at 16:51

## 2020-09-22 RX ADMIN — MORPHINE SULFATE 4 MILLIGRAM(S): 50 CAPSULE, EXTENDED RELEASE ORAL at 08:00

## 2020-09-22 RX ADMIN — MORPHINE SULFATE 4 MILLIGRAM(S): 50 CAPSULE, EXTENDED RELEASE ORAL at 02:01

## 2020-09-22 RX ADMIN — ENOXAPARIN SODIUM 40 MILLIGRAM(S): 100 INJECTION SUBCUTANEOUS at 21:11

## 2020-09-22 NOTE — PROGRESS NOTE ADULT - ASSESSMENT
This is a 55 years old male with no significant past medical history who presented to ED with worsening epigastric pain.    Patient is being admitted for the work up and treatment of Acute Pancreatitis.    Impression:    # Acute pancreatitis with cyst formation in he uncinate process    - Acute onset abdominal pain, lipase ~ 1900  - Unclear etiology, no stones on abd U/S, no ETOH hx, no new meds, TG and Ca wnl  - CT abdomen reveals Acute pancreatitis. 2.2 x 1.5 cm cystic focus in the uncinate process favored to reflect dilated side branches rather than acute pancreatic collection. Nonspecific focal dilatation of the main pancreatic duct to 7 mm in the pancreatic head without upstream atrophy. Suggest follow-up short interval pancreatic MRI once acute pathology resolves.  - US abdomen reveals gall bladder sludge , no signs of cholelithiasis  - c/w aggressive fluid hydration, IVF @ 250 cc/hr. NPO except meds  - pain control with IV morphine 2mg q2hrs for severe pain  - bowel regimen  - GI c/s: will need MRCP and MRI pancreas protocol as o/p r/o anomalous pancreatic duct as a cause.    # Hypomagnesemia   - 1.7. replenished today  - F/u AM mag    # obesity  - diet counselling  - lipid profile wnl  - HBA1C: 5.9%    DVT ppx ; lovenox  GI ppx : not needed  Ambulate as tolerated   Diet : NPO except meds  Code Full     This is a 55 years old male with no significant past medical history who presented to ED with worsening epigastric pain, diagnosed with Acute Pancreatitis with lipase of 1900 and positive  CT A/P findings.     # Acute pancreatitis with cyst formation in the uncinate process  - Acute onset abdominal pain, lipase ~ 1900  - Unclear etiology, no stones on abd U/S, no ETOH hx, no new meds, TG and Ca wnl. Possibly due to a stricture??  - CT abdomen reveals Acute pancreatitis: 2.2 x 1.5 cm cystic focus in the uncinate process favored to reflect dilated side branches rather than acute pancreatic collection. Nonspecific focal dilatation of the main pancreatic duct to 7 mm in the pancreatic head without upstream atrophy. Suggest follow-up short interval pancreatic MRI once acute pathology resolves.  - US abdomen reveals gall bladder sludge , no signs of cholelithiasis  - WBC is trending down 14.26 <- 16.19. Afebrile. Doubt necrotic progression, as pt is improving, but will cont to monitor  - c/w aggressive fluid hydration, IVF @ 250 cc/hr  - Diet advanced to clears. For dinner, can have full liquid if tolerating well   - pain control with IV morphine 2mg q2hrs for severe pain  - bowel regimen  - GI c/s: will need MRCP and MRI pancreas protocol as o/p r/o anomalous pancreatic duct as a cause.    # Hypomagnesemia   - 1.7. replenished today  - F/u AM mag    # obesity  - diet counselling  - lipid profile wnl, TG 80  - HBA1C: 5.9%    DVT ppx ; lovenox  GI ppx : not needed  Ambulate as tolerated   Diet : Clears for now, can advance to full for dinner   Code Full  Dispo: anticipated for tomorrow

## 2020-09-22 NOTE — PROGRESS NOTE ADULT - SUBJECTIVE AND OBJECTIVE BOX
Hospital Day:  1d    Subjective:    Patient is a 55y old  Male who presents with a chief complaint of Epigastric pain (21 Sep 2020 22:10)      Admitted to medicine for a primary diagnosis of     Past Medical Hx:     Past Sx:    Allergies:  No Known Allergies    Current Meds:   Standng Meds:  bisacodyl 5 milliGRAM(s) Oral at bedtime  chlorhexidine 2% Cloths 1 Application(s) Topical once  enoxaparin Injectable 40 milliGRAM(s) SubCutaneous at bedtime  lactated ringers. 1000 milliLiter(s) (250 mL/Hr) IV Continuous <Continuous>  multivitamin 1 Tablet(s) Oral daily  senna 2 Tablet(s) Oral at bedtime    PRN Meds:  acetaminophen   Tablet .. 650 milliGRAM(s) Oral every 6 hours PRN Mild Pain (1 - 3)  morphine  - Injectable 4 milliGRAM(s) IV Push every 4 hours PRN Severe Pain (7 - 10)  ondansetron Injectable 4 milliGRAM(s) IV Push every 6 hours PRN Nausea    HOME MEDICATIONS:      Vital Signs:   T(F): 99.5 (09-22-20 @ 05:29), Max: 100.3 (09-21-20 @ 20:12)  HR: 88 (09-22-20 @ 05:29) (83 - 89)  BP: 110/60 (09-22-20 @ 05:29) (100/67 - 110/60)  RR: 20 (09-22-20 @ 05:29) (18 - 20)  SpO2: 92% (09-21-20 @ 19:55) (92% - 97%)        Physical Exam:   GENERAL: NAD  HEENT: NCAT  CHEST/LUNG: CTAB  HEART: Regular rate and rhythm; s1 s2 appreciated, No murmurs, rubs, or gallops  ABDOMEN: Soft, Nontender, Nondistended; Bowel sounds present  EXTREMITIES: No LE edema b/l  SKIN: no rashes, no new lesions  NERVOUS SYSTEM:  Alert & Oriented X3  LINES/CATHETERS:        Labs:                         14.4   16.19 )-----------( 237      ( 21 Sep 2020 11:13 )             43.3     Neutophil% 85.0, Lymphocyte% 7.4, Monocyte% 6.8, Bands% 0.6 09-21-20 @ 11:13    21 Sep 2020 11:13    137    |  100    |  10     ----------------------------<  106    4.0     |  29     |  0.8      Ca    8.9        21 Sep 2020 11:13  Mg     1.7       21 Sep 2020 11:13    TPro  5.8    /  Alb  3.8    /  TBili  0.9    /  DBili  x      /  AST  14     /  ALT  9      /  AlkPhos  49     21 Sep 2020 11:13      Chol 180, , HDL 49, VLDL --, TRG 70 09-21-20 @ 11:13    Amylase --, Lipase 1918, 09-20-20 @ 20:44              Urinalysis Basic - ( 21 Sep 2020 00:00 )    Color: Light Yellow / Appearance: Clear / SG: >1.050 / pH: x  Gluc: x / Ketone: Negative  / Bili: Negative / Urobili: <2 mg/dL   Blood: x / Protein: Trace / Nitrite: Negative   Leuk Esterase: Negative / RBC: x / WBC x   Sq Epi: x / Non Sq Epi: x / Bacteria: x          Culture - Blood (collected 09-20-20 @ 20:44)  Source: .Blood Blood-Peripheral  Preliminary Report (09-22-20 @ 02:12):    No growth to date.    Culture - Blood (collected 09-20-20 @ 20:44)  Source: .Blood Blood-Peripheral  Preliminary Report (09-22-20 @ 02:12):    No growth to date.         Hospital Day:  1d    Subjective:    Patient is a 55y old  Male who presents with a chief complaint of Epigastric pain. No acute events overnight and in no distress this am. States abd pain has improved, expressed willingness to drink. Denies diarrhea, nausea.       Admitted to medicine for a primary diagnosis of pancreatitis     Past Medical Hx:     Past Sx:    Allergies:  No Known Allergies    Current Meds:   Standng Meds:  bisacodyl 5 milliGRAM(s) Oral at bedtime  chlorhexidine 2% Cloths 1 Application(s) Topical once  enoxaparin Injectable 40 milliGRAM(s) SubCutaneous at bedtime  lactated ringers. 1000 milliLiter(s) (250 mL/Hr) IV Continuous <Continuous>  multivitamin 1 Tablet(s) Oral daily  senna 2 Tablet(s) Oral at bedtime    PRN Meds:  acetaminophen   Tablet .. 650 milliGRAM(s) Oral every 6 hours PRN Mild Pain (1 - 3)  morphine  - Injectable 4 milliGRAM(s) IV Push every 4 hours PRN Severe Pain (7 - 10)  ondansetron Injectable 4 milliGRAM(s) IV Push every 6 hours PRN Nausea    HOME MEDICATIONS:      Vital Signs:   T(F): 99.5 (09-22-20 @ 05:29), Max: 100.3 (09-21-20 @ 20:12)  HR: 88 (09-22-20 @ 05:29) (83 - 89)  BP: 110/60 (09-22-20 @ 05:29) (100/67 - 110/60)  RR: 20 (09-22-20 @ 05:29) (18 - 20)  SpO2: 92% (09-21-20 @ 19:55) (92% - 97%)        Physical Exam:   GENERAL: NAD  HEENT: NCAT  CHEST/LUNG: CTAB  HEART: Regular rate and rhythm; s1 s2 appreciated, No murmurs, rubs, or gallops  ABDOMEN: Soft, Nontender, Nondistended; Bowel sounds present  EXTREMITIES: No LE edema b/l  SKIN: no rashes, no new lesions  NERVOUS SYSTEM:  Alert & Oriented X3        Labs:                         14.4   16.19 )-----------( 237      ( 21 Sep 2020 11:13 )             43.3     Neutophil% 85.0, Lymphocyte% 7.4, Monocyte% 6.8, Bands% 0.6 09-21-20 @ 11:13    21 Sep 2020 11:13    137    |  100    |  10     ----------------------------<  106    4.0     |  29     |  0.8      Ca    8.9        21 Sep 2020 11:13  Mg     1.7       21 Sep 2020 11:13    TPro  5.8    /  Alb  3.8    /  TBili  0.9    /  DBili  x      /  AST  14     /  ALT  9      /  AlkPhos  49     21 Sep 2020 11:13      Chol 180, , HDL 49, VLDL --, TRG 70 09-21-20 @ 11:13    Amylase --, Lipase 1918, 09-20-20 @ 20:44              Urinalysis Basic - ( 21 Sep 2020 00:00 )    Color: Light Yellow / Appearance: Clear / SG: >1.050 / pH: x  Gluc: x / Ketone: Negative  / Bili: Negative / Urobili: <2 mg/dL   Blood: x / Protein: Trace / Nitrite: Negative   Leuk Esterase: Negative / RBC: x / WBC x   Sq Epi: x / Non Sq Epi: x / Bacteria: x          Culture - Blood (collected 09-20-20 @ 20:44)  Source: .Blood Blood-Peripheral  Preliminary Report (09-22-20 @ 02:12):    No growth to date.    Culture - Blood (collected 09-20-20 @ 20:44)  Source: .Blood Blood-Peripheral  Preliminary Report (09-22-20 @ 02:12):    No growth to date.

## 2020-09-22 NOTE — PROGRESS NOTE ADULT - ASSESSMENT
This is a 55 years old male with history of HLD, former tobacco use, quit within the past year, who presented to ED with worsening epigastric pain, for the third time in a month.    Acute pancreatitis with cyst formation in the uncinate process  - Acute onset abdominal pain, lipase ~ 1900  - Unclear etiology, no stones on abd U/S, no ETOH hx, no new meds, TG and Ca wnl. Possibly due to a stricture??  - CT abdomen reveals Acute pancreatitis: 2.2 x 1.5 cm cystic focus in the uncinate process favored to reflect dilated side branches rather than acute pancreatic collection. Nonspecific focal dilatation of the main pancreatic duct to 7 mm in the pancreatic head without upstream atrophy. Suggest follow-up short interval pancreatic MRI once acute pathology resolves.  - WBC is trending down   - continue fluid hydration  - Diet advanced to clears  - pain control with IV morphine 2mg q2hrs for severe pain  - bowel regimen  - repeat Lipase in am  - GI c/s: will need MRCP and MRI pancreas protocol as o/p r/o anomalous pancreatic duct as a cause.    Hypomagnesemia   - replenished today  - F/u AM mag    Obesity  - diet counselling  - lipid profile wnl, TG 80  - HBA1C: 5.9%    DVT ppx ; Lovenox  GI ppx : not needed  Ambulate as tolerated   Diet : Clears for now, can advance as tolerated  Code Full

## 2020-09-22 NOTE — PROGRESS NOTE ADULT - SUBJECTIVE AND OBJECTIVE BOX
Gastroenterology progress note:     Patient is a 55y old  Male who presents with a chief complaint of Epigastric pain (22 Sep 2020 07:07)       Admitted on: 09-21-20    We are following the patient for: acute pancreatitis     Interval History: Pt feeling a bit better since yesterday. Epigastric pain improved from 10/10 to 7/10 this AM. Tolerated juice and jello this morning with minimal increase in pain, but is still requiring morphine for pain relief. Pt passing gas but has not had a BM.    Patient's medical problems are improving  Prior records reviewed: Y  History obtained from someone other than patient: N      PAST MEDICAL & SURGICAL HISTORY:    no pertinent medical Hx    s/p incarcerated supraumbilical ventral hernia repair with mesh, 2018    MEDICATIONS  (STANDING):  bisacodyl 5 milliGRAM(s) Oral at bedtime  chlorhexidine 2% Cloths 1 Application(s) Topical once  enoxaparin Injectable 40 milliGRAM(s) SubCutaneous at bedtime  lactated ringers. 1000 milliLiter(s) (250 mL/Hr) IV Continuous <Continuous>  multivitamin 1 Tablet(s) Oral daily  senna 2 Tablet(s) Oral at bedtime    MEDICATIONS  (PRN):  acetaminophen   Tablet .. 650 milliGRAM(s) Oral every 6 hours PRN Mild Pain (1 - 3)  morphine  - Injectable 4 milliGRAM(s) IV Push every 4 hours PRN Severe Pain (7 - 10)  ondansetron Injectable 4 milliGRAM(s) IV Push every 6 hours PRN Nausea      Allergies  No Known Allergies      Review of Systems:   Constitutional:  No Fever, No Chills  ENT/Mouth:  No Hearing Changes,  No Difficulty Swallowing  Eyes:  No Eye Pain, No Vision Changes  Cardiovascular:  No Chest Pain, No Palpitations  Respiratory:  No Cough, No Dyspnea  Gastrointestinal:  As described in HPI  Musculoskeletal:  No Joint Swelling, No Back Pain  Skin:  No Skin Lesions, No Jaundice  Neuro:  No Syncope, No Dizziness  Heme/Lymph:  No Bruising, No Bleeding.      Physical Examination:  T(C): 37.5 (09-22-20 @ 05:29), Max: 37.9 (09-21-20 @ 20:12)  HR: 88 (09-22-20 @ 05:29) (87 - 89)  BP: 110/60 (09-22-20 @ 05:29) (100/67 - 110/60)  RR: 20 (09-22-20 @ 05:29) (18 - 20)  SpO2: 94% (09-22-20 @ 07:50) (92% - 96%)      Constitutional: No acute distress  Eyes: Conjunctivae are clear, Sclera is non-icteric.  Ears Nose and throat: The external ears are normal appearing, Oral mucosa is pink and moist.  Respiratory:  No signs of respiratory distress. Lung sounds are clear bilaterally.  Cardiovascular:  S1 S2, Regular rate and rhythm.  GI: + tender to palpation epigastric area/hypogastrium but improved from yesterday. No guarding noted. Abdomen is soft, symmetric, and without distention. There are no visible lesions or scars. Bowel sounds are present and normoactive in all four quadrants. No masses, hepatomegaly, or splenomegaly are noted.   Neuro: No Tremor, No involuntary movements  Skin: No rashes, No Jaundice.  Extremities:  No cyanosis or edema.         Data: (reviewed by attending)                        12.8   14.26 )-----------( 216      ( 22 Sep 2020 06:33 )             38.1     Hgb trend:  12.8  09-22-20 @ 06:33  14.4  09-21-20 @ 11:13  15.8  09-20-20 @ 20:44      09-22    138  |  101  |  12  ----------------------------<  81  3.9   |  28  |  0.7    Ca    8.6      22 Sep 2020 06:33  Mg     1.7     09-22    TPro  5.2<L>  /  Alb  3.2<L>  /  TBili  0.9  /  DBili  x   /  AST  14  /  ALT  7   /  AlkPhos  48  09-22    Liver panel trend:  TBili 0.9   /   AST 14   /   ALT 7   /   AlkP 48   /   Tptn 5.2   /   Alb 3.2    /   DBili --      09-22  TBili 0.9   /   AST 14   /   ALT 9   /   AlkP 49   /   Tptn 5.8   /   Alb 3.8    /   DBili --      09-21  TBili 0.5   /   AST 19   /   ALT 14   /   AlkP 66   /   Tptn 7.1   /   Alb 4.6    /   DBili <0.2      09-20      PT/INR - ( 20 Sep 2020 20:59 )   PT: 12.40 sec;   INR: 1.08 ratio         PTT - ( 20 Sep 2020 20:59 )  PTT:28.8 sec    Culture - Urine (collected 21 Sep 2020 00:00)  Source: .Urine Clean Catch (Midstream)  Final Report (22 Sep 2020 08:42):    <10,000 CFU/mL Normal Urogenital Nita    Culture - Blood (collected 20 Sep 2020 20:44)  Source: .Blood Blood-Peripheral  Preliminary Report (22 Sep 2020 02:12):    No growth to date.    Culture - Blood (collected 20 Sep 2020 20:44)  Source: .Blood Blood-Peripheral  Preliminary Report (22 Sep 2020 02:12):    No growth to date.           Radiology:(reviewed by attending)  CT Abdomen and Pelvis w/ IV Cont:   EXAM:  CT ABDOMEN AND PELVIS IC        PROCEDURE DATE:  09/20/2020      INTERPRETATION:  CLINICAL STATEMENT: Periumbilical pain. History of hernia repair.    COMPARISON CT: None.    FINDINGS:    LOWER CHEST: Trace bilateral dependent atelectasis.    HEPATOBILIARY: Unremarkable.    SPLEEN: Unremarkable.    PANCREAS: There is diffuse pancreatic edema with peripancreatic inflammation and fluid. Focal ductal dilatation in the pancreatic head to 7 mm. 2.2 x 1.5 cm cystic focusis in the uncinate process.    ADRENAL GLANDS: Indeterminate 1.2 cm left adrenal gland nodule..    KIDNEYS: Symmetric renal enhancement. No hydronephrosis. Small left parapelvic cysts.    ABDOMINOPELVIC NODES: Prominent but subcentimeter peripancreatic lymph nodes, likely reactive.    PELVIC ORGANS: Unremarkable.    PERITONEUM/MESENTERY/BOWEL: No evidence of bowel obstruction or pneumoperitoneum. Normal appendix.    BONES/SOFT TISSUES: No acute osseous abnormality. Degenerative changes of the spine. L1 limbus vertebra. Postumbilical hernia repair with adjacent 2 cm periumbilical fat-containing hernia. Small bilateral fat-containing inguinal hernias also seen.    OTHER: Normal caliber aorta.      IMPRESSION:    Acute pancreatitis. 2.2 x 1.5 cmcystic focus in the uncinate process favored to reflect dilated side branches rather than acute pancreatic collection. Nonspecific focal dilatation of the main pancreatic duct to 7 mm in the pancreatic head without upstream atrophy. Suggest follow-upshort interval pancreatic MRI once acute pathology resolves.    1.2 cm indeterminate left adrenal gland nodule can be evaluated on the aforementioned suggested pancreatic MRI        US Abdomen Limited:   EXAM:  US ABDOMEN LIMITED        PROCEDURE DATE:  09/21/2020      INTERPRETATION:  CLINICAL INFORMATION: Right upper quadrant abdominal pain.    COMPARISON: None available.    TECHNIQUE: Sonography of the right upper quadrant.    FINDINGS:    Liver: Within normal limits.  Bile ducts: Normal caliber. Common bile duct measures 4 mm.  Gallbladder: Trace sludge. No evidence of cholelithiasis.  Pancreas: Edematous, and poorly visualized.  Right kidney: 11.0 cm. No hydronephrosis.  Ascites: None.  IVC: Visualized portions are within normal limits.    IMPRESSION:    Edematous pancreas compatible with acute pancreatitis, though poorly visualized due to overlying bowel gas.    Trace gallbladder sludge without evidence of cholelithiasis.

## 2020-09-22 NOTE — PROGRESS NOTE ADULT - SUBJECTIVE AND OBJECTIVE BOX
EMELIAANASTACIOJEFF  55y  Male  HPI:  This is a 55 years old male with no significant past medical history who presented to ED with worsening epigastric pain.    Patient was fine till this morning hen he developed severe e[pigastric pain after eating a protein bar. Pain was sudden in onset, continous, located in epigastrium  worsening gradually to 10/10 intensity, non-radiating associated with nausa and 3 episodes of NBNB vomiting. Patient reports no relieving factor but endorses that food intake made worse. Patient denies, headache, dizziness, chest pain, SOB, constipation, diarrha, alcohol intake.    In ED , patient's vitals were as follows     T(C): 36.8 (09-20-20 @ 20:25), Max: 36.8 (09-20-20 @ 20:25)  HR: 81 (09-20-20 @ 20:25) (81 - 81)  BP: 129/67 (09-20-20 @ 20:25) (129/67 - 129/67)  RR: 18 (09-20-20 @ 20:25) (18 - 18)  SpO2: 98% (09-20-20 @ 20:25) (98% - 98%)    His labs were significant for lipase of 1900 and CT abdomen showed acute inflammation of pancreatitis and US abdomen showed gall bladder sludge without evidence of cholelithiasis. Patient received 2.7L of IVF in ED.   (21 Sep 2020 03:31)    MEDICATIONS  (STANDING):  bisacodyl 5 milliGRAM(s) Oral at bedtime  chlorhexidine 2% Cloths 1 Application(s) Topical once  enoxaparin Injectable 40 milliGRAM(s) SubCutaneous at bedtime  folic acid 1 milliGRAM(s) Oral daily  lactated ringers. 1000 milliLiter(s) (250 mL/Hr) IV Continuous <Continuous>  multivitamin 1 Tablet(s) Oral daily  senna 2 Tablet(s) Oral at bedtime  thiamine 100 milliGRAM(s) Oral daily    MEDICATIONS  (PRN):  acetaminophen   Tablet .. 650 milliGRAM(s) Oral every 6 hours PRN Mild Pain (1 - 3)  morphine  - Injectable 4 milliGRAM(s) IV Push every 4 hours PRN Severe Pain (7 - 10)  ondansetron Injectable 4 milliGRAM(s) IV Push every 6 hours PRN Nausea    INTERVAL EVENTS: Patient seen today, chart reviewed. Patient states he is feeling better today, less pain, started to pass gas.    T(C): 37.5 (09-22-20 @ 05:29), Max: 37.9 (09-21-20 @ 20:12)  HR: 88 (09-22-20 @ 05:29) (87 - 89)  BP: 110/60 (09-22-20 @ 05:29) (100/67 - 110/60)  RR: 20 (09-22-20 @ 05:29) (18 - 20)  SpO2: 94% (09-22-20 @ 07:50) (92% - 96%)  Wt(kg): --Vital Signs Last 24 Hrs  T(C): 37.5 (22 Sep 2020 05:29), Max: 37.9 (21 Sep 2020 20:12)  T(F): 99.5 (22 Sep 2020 05:29), Max: 100.3 (21 Sep 2020 20:12)  HR: 88 (22 Sep 2020 05:29) (87 - 89)  BP: 110/60 (22 Sep 2020 05:29) (100/67 - 110/60)  BP(mean): --  RR: 20 (22 Sep 2020 05:29) (18 - 20)  SpO2: 94% (22 Sep 2020 07:50) (92% - 96%)    PHYSICAL EXAM:  GENERAL: NAD  NECK: Supple, No JVD  CHEST/LUNG: Clear; Decreased effort  HEART: S1, S2, Regular rate and rhythm  ABDOMEN: Soft, Mildly tender,  Bowel sounds present  EXTREMITIES: No clubbing, cyanosis, or edema      LABS:                        12.8   14.26 )-----------( 216      ( 22 Sep 2020 06:33 )             38.1             09-22    138  |  101  |  12  ----------------------------<  81  3.9   |  28  |  0.7    Ca    8.6      22 Sep 2020 06:33  Mg     1.7     09-22    TPro  5.2<L>  /  Alb  3.2<L>  /  TBili  0.9  /  DBili  x   /  AST  14  /  ALT  7   /  AlkPhos  48  09-22    LIVER FUNCTIONS - ( 22 Sep 2020 06:33 )  Alb: 3.2 g/dL / Pro: 5.2 g/dL / ALK PHOS: 48 U/L / ALT: 7 U/L / AST: 14 U/L / GGT: x                   PT/INR - ( 20 Sep 2020 20:59 )   PT: 12.40 sec;   INR: 1.08 ratio         PTT - ( 20 Sep 2020 20:59 )  PTT:28.8 sec    l    Urinalysis Basic - ( 21 Sep 2020 00:00 )    Color: Light Yellow / Appearance: Clear / SG: >1.050 / pH: x  Gluc: x / Ketone: Negative  / Bili: Negative / Urobili: <2 mg/dL   Blood: x / Protein: Trace / Nitrite: Negative   Leuk Esterase: Negative / RBC: x / WBC x   Sq Epi: x / Non Sq Epi: x / Bacteria: x    Lipid Profile (09.21.20 @ 11:13)   Total Cholesterol/HDL Ratio Measurement: 3.7 Ratio   Cholesterol, Serum: 180 mg/dL   Triglycerides, Serum: 70 mg/dL Lipase, Serum (09.20.20 @ 20:44)       Lipase, Serum: 1918: CALLED TO/READ BACK BY /PA REYES @ 10:15PM 9/20/20 U/L   Culture - Urine (collected 21 Sep 2020 00:00)  Source: .Urine Clean Catch (Midstream)  Final Report (22 Sep 2020 08:42):    <10,000 CFU/mL Normal Urogenital Nita      Culture - Blood (collected 20 Sep 2020 20:44)  Source: .Blood Blood-Peripheral  Preliminary Report (22 Sep 2020 02:12):    No growth to date.    Culture - Blood (collected 20 Sep 2020 20:44)  Source: .Blood Blood-Peripheral  Preliminary Report (22 Sep 2020 02:12):    No growth to date.      RADIOLOGY & ADDITIONAL TESTS:  ra< from: CT Abdomen and Pelvis w/ IV Cont (09.20.20 @ 21:49) >    PROCEDURE DATE:  09/20/2020            INTERPRETATION:  CLINICAL STATEMENT: Periumbilical pain. History of hernia repair.    TECHNIQUE: Contiguous axial CT images were obtained from the lower chest to the pubic symphysis following administration of 100cc intravenous contrast.  Oral contrast was not administered.  Reformatted images in the coronal and sagittal planes were acquired.    COMPARISON CT: None.      FINDINGS:    LOWER CHEST: Trace bilateral dependent atelectasis.    HEPATOBILIARY: Unremarkable.    SPLEEN: Unremarkable.    PANCREAS: There is diffuse pancreatic edema with peripancreatic inflammation and fluid. Focal ductal dilatation in the pancreatic head to 7 mm. 2.2 x 1.5 cm cystic focusis in the uncinate process.    ADRENAL GLANDS: Indeterminate 1.2 cm left adrenal gland nodule..    KIDNEYS: Symmetric renal enhancement. No hydronephrosis. Small left parapelvic cysts.    ABDOMINOPELVIC NODES: Prominent but subcentimeter peripancreatic lymph nodes, likely reactive.    PELVIC ORGANS: Unremarkable.    PERITONEUM/MESENTERY/BOWEL: No evidence of bowel obstruction or pneumoperitoneum. Normal appendix.    BONES/SOFT TISSUES: No acute osseous abnormality. Degenerative changes of the spine. L1 limbus vertebra. Postumbilical hernia repair with adjacent 2 cm periumbilical fat-containing hernia. Small bilateral fat-containing inguinal hernias also seen.    OTHER: Normal caliber aorta.      IMPRESSION:    Acute pancreatitis. 2.2 x 1.5 cmcystic focus in the uncinate process favored to reflect dilated side branches rather than acute pancreatic collection. Nonspecific focal dilatation of the main pancreatic duct to 7 mm in the pancreatic head without upstream atrophy. Suggest follow-upshort interval pancreatic MRI once acute pathology resolves.    1.2 cm indeterminate left adrenal gland nodule can be evaluated on the aforementioned suggested pancreatic MRI        < end of copied text >

## 2020-09-22 NOTE — PROGRESS NOTE ADULT - ASSESSMENT
Assessment and Plan:    Pt is 56 y/o male with no significant PMHx, not on any home meds, presenting for acute epigastric abdominal pain, found to have acute pancreatitis x3 in past month    # Acute pancreatitis x3 in past month  - no clear etiology; no stones on imaging, pt denies alcohol use, no recent new medications, triglycerides and Ca wnl.  - continue goal directed aggressive fluid resuscitation, no clinical signs of fluid overload at this time  - repeat Hct/BUN improvement noted. Monitor Hb/Hct for further decrease (most likely dilutional vs falsely elevated on admission)  - pain control as needed  - will need MRCP and MRI pancreas protocol as o/p once acute event resolves Assessment and Plan:    Pt is 54 y/o male with no significant PMHx, not on any home meds, presenting for acute epigastric abdominal pain, found to have acute pancreatitis x3 in past month    # Acute pancreatitis x3 in past month  - no clear etiology; no stones on imaging, pt denies alcohol use, no recent new medications, triglycerides and Ca wnl.  - continue goal directed aggressive fluid resuscitation, no clinical signs of fluid overload at this time  - repeat Hct/BUN improvement noted. Monitor Hb/Hct for further decrease (most likely dilutional vs falsely elevated on admission)  - pain control as needed  - will need MRCP and MRI pancreas protocol as o/p once acute event resolves    # CRC screening  - rec standard screening colonoscopy (pt never had one)

## 2020-09-23 ENCOUNTER — TRANSCRIPTION ENCOUNTER (OUTPATIENT)
Age: 55
End: 2020-09-23

## 2020-09-23 VITALS
TEMPERATURE: 98 F | SYSTOLIC BLOOD PRESSURE: 105 MMHG | HEART RATE: 71 BPM | RESPIRATION RATE: 18 BRPM | DIASTOLIC BLOOD PRESSURE: 54 MMHG

## 2020-09-23 LAB
ALBUMIN SERPL ELPH-MCNC: 2.8 G/DL — LOW (ref 3.5–5.2)
ALP SERPL-CCNC: 48 U/L — SIGNIFICANT CHANGE UP (ref 30–115)
ALT FLD-CCNC: 7 U/L — SIGNIFICANT CHANGE UP (ref 0–41)
ANION GAP SERPL CALC-SCNC: 5 MMOL/L — LOW (ref 7–14)
AST SERPL-CCNC: 11 U/L — SIGNIFICANT CHANGE UP (ref 0–41)
BASOPHILS # BLD AUTO: 0.04 K/UL — SIGNIFICANT CHANGE UP (ref 0–0.2)
BASOPHILS NFR BLD AUTO: 0.4 % — SIGNIFICANT CHANGE UP (ref 0–1)
BILIRUB SERPL-MCNC: 0.6 MG/DL — SIGNIFICANT CHANGE UP (ref 0.2–1.2)
BUN SERPL-MCNC: 7 MG/DL — LOW (ref 10–20)
CALCIUM SERPL-MCNC: 8 MG/DL — LOW (ref 8.5–10.1)
CHLORIDE SERPL-SCNC: 108 MMOL/L — SIGNIFICANT CHANGE UP (ref 98–110)
CO2 SERPL-SCNC: 29 MMOL/L — SIGNIFICANT CHANGE UP (ref 17–32)
CREAT SERPL-MCNC: 0.7 MG/DL — SIGNIFICANT CHANGE UP (ref 0.7–1.5)
EOSINOPHIL # BLD AUTO: 0.35 K/UL — SIGNIFICANT CHANGE UP (ref 0–0.7)
EOSINOPHIL NFR BLD AUTO: 3.8 % — SIGNIFICANT CHANGE UP (ref 0–8)
GLUCOSE SERPL-MCNC: 89 MG/DL — SIGNIFICANT CHANGE UP (ref 70–99)
HCT VFR BLD CALC: 34.8 % — LOW (ref 42–52)
HGB BLD-MCNC: 11.5 G/DL — LOW (ref 14–18)
IMM GRANULOCYTES NFR BLD AUTO: 0.3 % — SIGNIFICANT CHANGE UP (ref 0.1–0.3)
LYMPHOCYTES # BLD AUTO: 1.75 K/UL — SIGNIFICANT CHANGE UP (ref 1.2–3.4)
LYMPHOCYTES # BLD AUTO: 18.9 % — LOW (ref 20.5–51.1)
MAGNESIUM SERPL-MCNC: 1.9 MG/DL — SIGNIFICANT CHANGE UP (ref 1.8–2.4)
MCHC RBC-ENTMCNC: 30.5 PG — SIGNIFICANT CHANGE UP (ref 27–31)
MCHC RBC-ENTMCNC: 33 G/DL — SIGNIFICANT CHANGE UP (ref 32–37)
MCV RBC AUTO: 92.3 FL — SIGNIFICANT CHANGE UP (ref 80–94)
MONOCYTES # BLD AUTO: 0.82 K/UL — HIGH (ref 0.1–0.6)
MONOCYTES NFR BLD AUTO: 8.8 % — SIGNIFICANT CHANGE UP (ref 1.7–9.3)
NEUTROPHILS # BLD AUTO: 6.28 K/UL — SIGNIFICANT CHANGE UP (ref 1.4–6.5)
NEUTROPHILS NFR BLD AUTO: 67.8 % — SIGNIFICANT CHANGE UP (ref 42.2–75.2)
NRBC # BLD: 0 /100 WBCS — SIGNIFICANT CHANGE UP (ref 0–0)
PLATELET # BLD AUTO: 184 K/UL — SIGNIFICANT CHANGE UP (ref 130–400)
POTASSIUM SERPL-MCNC: 4 MMOL/L — SIGNIFICANT CHANGE UP (ref 3.5–5)
POTASSIUM SERPL-SCNC: 4 MMOL/L — SIGNIFICANT CHANGE UP (ref 3.5–5)
PROT SERPL-MCNC: 4.6 G/DL — LOW (ref 6–8)
RBC # BLD: 3.77 M/UL — LOW (ref 4.7–6.1)
RBC # FLD: 12.8 % — SIGNIFICANT CHANGE UP (ref 11.5–14.5)
SODIUM SERPL-SCNC: 142 MMOL/L — SIGNIFICANT CHANGE UP (ref 135–146)
WBC # BLD: 9.27 K/UL — SIGNIFICANT CHANGE UP (ref 4.8–10.8)
WBC # FLD AUTO: 9.27 K/UL — SIGNIFICANT CHANGE UP (ref 4.8–10.8)

## 2020-09-23 PROCEDURE — 99232 SBSQ HOSP IP/OBS MODERATE 35: CPT

## 2020-09-23 RX ORDER — SENNA PLUS 8.6 MG/1
2 TABLET ORAL
Qty: 30 | Refills: 0
Start: 2020-09-23 | End: 2020-10-07

## 2020-09-23 RX ORDER — FOLIC ACID 0.8 MG
1 TABLET ORAL
Qty: 15 | Refills: 0
Start: 2020-09-23 | End: 2020-10-07

## 2020-09-23 RX ORDER — OXYCODONE AND ACETAMINOPHEN 5; 325 MG/1; MG/1
1 TABLET ORAL EVERY 6 HOURS
Refills: 0 | Status: DISCONTINUED | OUTPATIENT
Start: 2020-09-23 | End: 2020-09-23

## 2020-09-23 RX ORDER — THIAMINE MONONITRATE (VIT B1) 100 MG
1 TABLET ORAL
Qty: 15 | Refills: 0
Start: 2020-09-23 | End: 2020-10-07

## 2020-09-23 RX ORDER — ONDANSETRON 8 MG/1
1 TABLET, FILM COATED ORAL
Qty: 9 | Refills: 0
Start: 2020-09-23 | End: 2020-09-25

## 2020-09-23 RX ORDER — ACETAMINOPHEN 500 MG
2 TABLET ORAL
Qty: 120 | Refills: 0
Start: 2020-09-23 | End: 2020-10-07

## 2020-09-23 RX ADMIN — Medication 100 MILLIGRAM(S): at 11:51

## 2020-09-23 RX ADMIN — Medication 1 TABLET(S): at 11:51

## 2020-09-23 RX ADMIN — Medication 1 MILLIGRAM(S): at 11:51

## 2020-09-23 RX ADMIN — MORPHINE SULFATE 4 MILLIGRAM(S): 50 CAPSULE, EXTENDED RELEASE ORAL at 02:10

## 2020-09-23 RX ADMIN — MORPHINE SULFATE 4 MILLIGRAM(S): 50 CAPSULE, EXTENDED RELEASE ORAL at 06:13

## 2020-09-23 RX ADMIN — MORPHINE SULFATE 4 MILLIGRAM(S): 50 CAPSULE, EXTENDED RELEASE ORAL at 06:54

## 2020-09-23 RX ADMIN — MORPHINE SULFATE 4 MILLIGRAM(S): 50 CAPSULE, EXTENDED RELEASE ORAL at 03:05

## 2020-09-23 NOTE — PROGRESS NOTE ADULT - ASSESSMENT
Assessment and Plan:    Pt is 54 y/o male with no significant PMHx, not on any home meds, presenting for acute epigastric abdominal pain, found to have acute pancreatitis x3 in past month    # Acute pancreatitis x3 in past month  - no clear etiology; no stones on imaging, pt denies alcohol use, no recent new medications, triglycerides and Ca wnl  - repeat Hct/BUN decrease noted. Monitor Hb/Hct for further decrease (most likely dilutional vs falsely elevated on admission)  - off IVF at this time  - pain control as needed, currently not requiring pain meds  - will need GI f/u and MRCP and MRI pancreas protocol as o/p once acute event resolves    # CRC screening  - rec standard screening colonoscopy as o/p (pt never had one)    Please recall PRN Assessment and Plan:    Pt is 56 y/o male with no significant PMHx, not on any home meds, presenting for acute epigastric abdominal pain, found to have acute pancreatitis x3 in past month    # Acute pancreatitis x3 in past month  - no clear etiology; no stones on imaging, pt denies alcohol use, no recent new medications, triglycerides and Ca wnl  - repeat Hct/BUN decrease noted. Monitor Hb/Hct for further decrease (most likely dilutional vs falsely elevated on admission)  - off IVF at this time  - pain control as needed, currently not requiring pain meds  - rec advance to low fat diet as tolerated  - will need GI f/u and MRCP and MRI pancreas protocol as o/p once acute event resolves    # CRC screening  - rec standard screening colonoscopy as o/p (pt never had one)    Please recall PRN

## 2020-09-23 NOTE — PROGRESS NOTE ADULT - ASSESSMENT
This is a 55 years old male with history of HLD, former tobacco use, quit within the past year, who presented to ED with worsening epigastric pain, for the third time in a month.    Acute pancreatitis with cyst formation in the uncinate process  - Acute onset abdominal pain, lipase ~ 1900  - Unclear etiology, no stones on abd U/S, no ETOH hx, no new meds, TG and Ca wnl. Possibly due to a stricture??  - CT abdomen reveals Acute pancreatitis: 2.2 x 1.5 cm cystic focus in the uncinate process favored to reflect dilated side branches rather than acute pancreatic collection. Nonspecific focal dilatation of the main pancreatic duct to 7 mm in the pancreatic head without upstream atrophy. Suggest follow-up short interval pancreatic MRI once acute pathology resolves.  - WBC is trending down   - continue fluid hydration  - Diet advanced to clears- then soft  - pain has been controlled with IV morphine 2mg q2hrs for severe pain  - bowel regimen  - repeat Lipase in am- rec'd by GI- pending  - GI c/s: will need MRCP and MRI pancreas protocol as o/p r/o anomalous pancreatic duct as a cause.  - if he tolerates diet at lunch, will be DC.    Hypomagnesemia   - replenished   - F/u mag- noted    Obesity  - diet counselling  - lipid profile wnl, TG 80  - HBA1C: 5.9%    DVT ppx ; Lovenox  GI ppx : not needed  Ambulate as tolerated   Diet : Clears for now, can advance as tolerated  Code Full

## 2020-09-23 NOTE — PROGRESS NOTE ADULT - SUBJECTIVE AND OBJECTIVE BOX
Gastroenterology progress note:     Patient is a 55y old  Male who presents with a chief complaint of Epigastric pain (23 Sep 2020 06:41)       Admitted on: 09-21-20    We are following the patient for: acute pancreatitis     Interval History: Pt states he is feeling better this morning and that pain has resolved. No pain meds since 4 AM. Tolerated soft breakfast with no pain. Had one small brown BM.     Patient's medical problems are improving  Prior records reviewed, yes  History obtained from someone other than patient; no      PAST MEDICAL & SURGICAL HISTORY:    no pertinent medical Hx    s/p incarcerated supraumbilical ventral hernia repair with mesh, 2018    MEDICATIONS  (STANDING):  bisacodyl 5 milliGRAM(s) Oral at bedtime  enoxaparin Injectable 40 milliGRAM(s) SubCutaneous at bedtime  folic acid 1 milliGRAM(s) Oral daily  multivitamin 1 Tablet(s) Oral daily  senna 2 Tablet(s) Oral at bedtime  thiamine 100 milliGRAM(s) Oral daily    MEDICATIONS  (PRN):  acetaminophen   Tablet .. 650 milliGRAM(s) Oral every 6 hours PRN Mild Pain (1 - 3)  ondansetron Injectable 4 milliGRAM(s) IV Push every 6 hours PRN Nausea  oxycodone    5 mG/acetaminophen 325 mG 1 Tablet(s) Oral every 6 hours PRN Severe Pain (7 - 10)      Allergies  No Known Allergies      Review of Systems:   Constitutional:  No Fever, No Chills  ENT/Mouth:  No Hearing Changes,  No Difficulty Swallowing  Eyes:  No Eye Pain, No Vision Changes  Cardiovascular:  No Chest Pain, No Palpitations  Respiratory:  No Cough, No Dyspnea  Gastrointestinal:  As described in HPI  Musculoskeletal:  No Joint Swelling, No Back Pain  Skin:  No Skin Lesions, No Jaundice  Neuro:  No Syncope, No Dizziness  Heme/Lymph:  No Bruising, No Bleeding.      Physical Examination:  T(C): 36.7 (09-23-20 @ 04:57), Max: 37.5 (09-22-20 @ 13:58)  HR: 72 (09-23-20 @ 04:57) (72 - 87)  BP: 90/53 (09-23-20 @ 04:57) (90/53 - 112/69)  RR: 18 (09-23-20 @ 04:57) (18 - 19)  SpO2: 95% (09-23-20 @ 08:00) (95% - 95%)      09-22-20 @ 07:01  -  09-23-20 @ 07:00  --------------------------------------------------------  IN: 250 mL / OUT: 0 mL / NET: 250 mL    09-23-20 @ 07:01  -  09-23-20 @ 11:08  --------------------------------------------------------  IN: 250 mL / OUT: 0 mL / NET: 250 mL      Constitutional: No acute distress.  Eyes: Conjunctivae are clear, Sclera is non-icteric.  Ears Nose and throat: The external ears are normal appearing, Oral mucosa is pink and moist.  Respiratory:  No signs of respiratory distress. Lung sounds are clear bilaterally.  Cardiovascular:  S1 S2, Regular rate and rhythm.  GI: Abdomen is non-tender, no guarding present. Abdomen is soft, symmetric, and without distention. There are no visible lesions or scars. Bowel sounds are present and normoactive in all four quadrants. No masses, hepatomegaly, or splenomegaly are noted.   Neuro: No Tremor, No involuntary movements  Skin: No rashes, No Jaundice.  Extremities:  No cyanosis or edema.          Data: (reviewed by attending)                        11.5   9.27  )-----------( 184      ( 23 Sep 2020 06:51 )             34.8     Hgb trend:  11.5  09-23-20 @ 06:51  12.8  09-22-20 @ 06:33  14.4  09-21-20 @ 11:13  15.8  09-20-20 @ 20:44        09-23    142  |  108  |  7<L>  ----------------------------<  89  4.0   |  29  |  0.7    Ca    8.0<L>      23 Sep 2020 06:51  Mg     1.9     09-23    TPro  4.6<L>  /  Alb  2.8<L>  /  TBili  0.6  /  DBili  x   /  AST  11  /  ALT  7   /  AlkPhos  48  09-23    Liver panel trend:  TBili 0.6   /   AST 11   /   ALT 7   /   AlkP 48   /   Tptn 4.6   /   Alb 2.8    /   DBili --      09-23  TBili 0.9   /   AST 14   /   ALT 7   /   AlkP 48   /   Tptn 5.2   /   Alb 3.2    /   DBili --      09-22  TBili 0.9   /   AST 14   /   ALT 9   /   AlkP 49   /   Tptn 5.8   /   Alb 3.8    /   DBili --      09-21  TBili 0.5   /   AST 19   /   ALT 14   /   AlkP 66   /   Tptn 7.1   /   Alb 4.6    /   DBili <0.2      09-20        Culture - Urine (collected 21 Sep 2020 00:00)  Source: .Urine Clean Catch (Midstream)  Final Report (22 Sep 2020 08:42):    <10,000 CFU/mL Normal Urogenital Nita    Culture - Blood (collected 20 Sep 2020 20:44)  Source: .Blood Blood-Peripheral  Preliminary Report (22 Sep 2020 02:12):    No growth to date.    Culture - Blood (collected 20 Sep 2020 20:44)  Source: .Blood Blood-Peripheral  Preliminary Report (22 Sep 2020 02:12):    No growth to date.        Radiology:(reviewed by attending)    CT Abdomen and Pelvis w/ IV Cont:   EXAM:  CT ABDOMEN AND PELVIS IC        PROCEDURE DATE:  09/20/2020      INTERPRETATION:  CLINICAL STATEMENT: Periumbilical pain. History of hernia repair.    COMPARISON CT: None.    FINDINGS:    LOWER CHEST: Trace bilateral dependent atelectasis.    HEPATOBILIARY: Unremarkable.    SPLEEN: Unremarkable.    PANCREAS: There is diffuse pancreatic edema with peripancreatic inflammation and fluid. Focal ductal dilatation in the pancreatic head to 7 mm. 2.2 x 1.5 cm cystic focus in the uncinate process.    ADRENAL GLANDS: Indeterminate 1.2 cm left adrenal gland nodule..    KIDNEYS: Symmetric renal enhancement. No hydronephrosis. Small left parapelvic cysts.    ABDOMINOPELVIC NODES: Prominent but subcentimeter peripancreatic lymph nodes, likely reactive.    PELVIC ORGANS: Unremarkable.    PERITONEUM/MESENTERY/BOWEL: No evidence of bowel obstruction or pneumoperitoneum. Normal appendix.    BONES/SOFT TISSUES: No acute osseous abnormality. Degenerative changes of the spine. L1 limbus vertebra. Postumbilical hernia repair with adjacent 2 cm periumbilical fat-containing hernia. Small bilateral fat-containing inguinal hernias also seen.    OTHER: Normal caliber aorta.      IMPRESSION:    Acute pancreatitis. 2.2 x 1.5 cmcystic focus in the uncinate process favored to reflect dilated side branches rather than acute pancreatic collection. Nonspecific focal dilatation of the main pancreatic duct to 7 mm in the pancreatic head without upstream atrophy. Suggest follow-upshort interval pancreatic MRI once acute pathology resolves.    1.2 cm indeterminate left adrenal gland nodule can be evaluated on the aforementioned suggested pancreatic MRI        US Abdomen Limited:   EXAM:  US ABDOMEN LIMITED        PROCEDURE DATE:  09/21/2020      INTERPRETATION:  CLINICAL INFORMATION: Right upper quadrant abdominal pain.    COMPARISON: None available.    TECHNIQUE: Sonography of the right upper quadrant.    FINDINGS:    Liver: Within normal limits.  Bile ducts: Normal caliber. Common bile duct measures 4 mm.  Gallbladder: Trace sludge. No evidence of cholelithiasis.  Pancreas: Edematous, and poorly visualized.  Right kidney: 11.0 cm. No hydronephrosis.  Ascites: None.  IVC: Visualized portions are within normal limits.    IMPRESSION:    Edematous pancreas compatible with acute pancreatitis, though poorly visualized due to overlying bowel gas.    Trace gallbladder sludge without evidence of cholelithiasis.

## 2020-09-23 NOTE — DISCHARGE NOTE PROVIDER - HOSPITAL COURSE
This is a 55 years old male with no significant past medical history who presented to ED with worsening epigastric pain.    Patient was fine till this morning hen he developed severe e[pigastric pain after eating a protein bar. Pain was sudden in onset, continous, located in epigastrium  worsening gradually to 10/10 intensity, non-radiating associated with nausa and 3 episodes of NBNB vomiting. Patient reports no relieving factor but endorses that food intake made worse. Patient denies, headache, dizziness, chest pain, SOB, constipation, diarrha, alcohol intake.    In ED , patient's vitals were as follows     T(C): 36.8 (09-20-20 @ 20:25), Max: 36.8 (09-20-20 @ 20:25)  HR: 81 (09-20-20 @ 20:25) (81 - 81)  BP: 129/67 (09-20-20 @ 20:25) (129/67 - 129/67)  RR: 18 (09-20-20 @ 20:25) (18 - 18)  SpO2: 98% (09-20-20 @ 20:25) (98% - 98%)    His labs were significant for lipase of 1900 and CT abdomen showed acute inflammation of pancreatitis and US abdomen showed gall bladder sludge without evidence of cholelithiasis. Patient received 2.7L of IVF in ED.  This is a 55 years old male with no significant past medical history who presented to ED with worsening epigastric pain, diagnosed with Acute Pancreatitis with lipase of 1900 and positive  CT A/P findings.     # Acute pancreatitis with cyst formation in the uncinate process  - Acute onset abdominal pain, lipase ~ 1900  - Unclear etiology, no stones on abd U/S, no ETOH hx, no new meds, TG and Ca wnl. Possibly due to a stricture??  - CT abdomen reveals Acute pancreatitis: 2.2 x 1.5 cm cystic focus in the uncinate process favored to reflect dilated side branches rather than acute pancreatic collection. Nonspecific focal dilatation of the main pancreatic duct to 7 mm in the pancreatic head without upstream atrophy. Suggest follow-up short interval pancreatic MRI once acute pathology resolves.  - US abdomen reveals gall bladder sludge , no signs of cholelithiasis  - WBC is trending down 14.26 <- 16.19. Afebrile. Doubt necrotic progression, as pt is improving, but will cont to monitor  - c/w aggressive fluid hydration, IVF @ 250 cc/hr  - Diet advanced to clears. For dinner, can have full liquid if tolerating well   - pain control with IV morphine 2mg q2hrs for severe pain  - bowel regimen  - GI c/s: will need MRCP and MRI pancreas protocol as o/p r/o anomalous pancreatic duct as a cause.    # Hypomagnesemia   - 1.7. replenished today  - F/u AM mag    # obesity  - diet counselling  - lipid profile wnl, TG 80  - HBA1C: 5.9%    DVT ppx ; lovenox  GI ppx : not needed  Ambulate as tolerated   Diet : Clears for now, can advance to full for dinner   Code Full  Dispo: anticipated for tomorrow This is a 55 years old male with no significant past medical history who presented to ED with worsening epigastric pain.    Patient was fine till this morning hen he developed severe e[pigastric pain after eating a protein bar. Pain was sudden in onset, continous, located in epigastrium  worsening gradually to 10/10 intensity, non-radiating associated with nausa and 3 episodes of NBNB vomiting. Patient reports no relieving factor but endorses that food intake made worse. Patient denies, headache, dizziness, chest pain, SOB, constipation, diarrha, alcohol intake.    In ED , patient's vitals were wnl. Labs were significant for lipase of 1900 and CT abdomen showed acute inflammation of pancreatitis and US abdomen showed gall bladder sludge without evidence of cholelithiasis. Patient received 2.7L of IVF in ED.    He was admitted to medicine for a diagnosis of pancreatitis cyst formation in the uncinate process. No stones identified on abd U/S, no ETOH hx, no new meds, TG and Ca wnl. Possibly due to a stricture??  Pt underwent aggressive fluid hydration, IVF @ 250 cc/hr, pain was controlled with IV morphine 2mg q2hrs for severe pain.  Diet advanced as tolerated.  He was seen by GI c/s: will need MRCP and MRI pancreas protocol as o/p r/o anomalous pancreatic duct as a cause.    Pt is off IVF and IV pain meds, tolerating regular diet well. he is stable and ready to be d/c home today and outpt f/u with GI for MRCP and screening colonoscopy.

## 2020-09-23 NOTE — DISCHARGE NOTE PROVIDER - NSDCMRMEDTOKEN_GEN_ALL_CORE_FT
acetaminophen 325 mg oral tablet: 2 tab(s) orally every 6 hours, As needed, Mild Pain (1 - 3)  folic acid 1 mg oral tablet: 1 tab(s) orally once a day  Multiple Vitamins oral tablet: 1 tab(s) orally once a day  ondansetron 4 mg oral tablet: 1 tab(s) injectable every 8 hours, As Needed  senna oral tablet: 2 tab(s) orally once a day (at bedtime)  thiamine 100 mg oral tablet: 1 tab(s) orally once a day

## 2020-09-23 NOTE — PROGRESS NOTE ADULT - ATTENDING COMMENTS
Patient with acute pancreatitis of unclear etiology. Ct scan shows side branch IPMT. Will need furt
I personally interviewed and examined the patient Pain improving Rec advance diet.
I personally interviewed and examined the patient Pain much improved advance diet
Pt did tolerate soft diet- was OK'd for DC. F/U with PCP and GI

## 2020-09-23 NOTE — DISCHARGE NOTE NURSING/CASE MANAGEMENT/SOCIAL WORK - PATIENT PORTAL LINK FT
You can access the FollowMyHealth Patient Portal offered by [facility] by registering at the following website: [email or web address]. By joining [facility]’s FollowMyHealth portal, you will also be able to view your health information using other applications (apps) compatible with our system.

## 2020-09-23 NOTE — DISCHARGE NOTE PROVIDER - NSDCCPCAREPLAN_GEN_ALL_CORE_FT
PRINCIPAL DISCHARGE DIAGNOSIS  Diagnosis: Acute pancreatitis without infection or necrosis, unspecified pancreatitis type  Assessment and Plan of Treatment: You were diagnosed with pancreatitis via elevated Lipase and CT of the abdomen and pelvis. You have no risk factor for pancreatitis, no gallstones, no elevated triglycerides, calcium and you deny alcohol use. Given the unclear etiology of your pancreatitis, please follow up with GI as an outpatient in 1 week for MRCP and to schcedule a screening colonoscopy.  Please avoid fatty foods until your abdominal pain has resolved completely and maintain ample fluid intake. Take pain medications as needed.

## 2020-09-23 NOTE — PROGRESS NOTE ADULT - SUBJECTIVE AND OBJECTIVE BOX
Chart reviewed, patient examined. Pertinent results reviewed.  Case discussed with HO; specialist f/u reviewed  HD#3 (ED late 9/20)  JEFF AHMADI    HPI:  This is a 55 years old male with no significant past medical history who presented to ED with worsening epigastric pain.    Patient was fine till this morning hen he developed severe epigastric pain after eating a protein bar. Pain was sudden in onset, continuous located in epigastrium  worsening gradually to 10/10 intensity, non-radiating associated with nausa and 3 episodes of NBNB vomiting. Patient reports no relieving factor but endorses that food intake made worse. Patient denies, headache, dizziness, chest pain, SOB, constipation, diarrha, alcohol intake. Since admission, & NPO time- pt is feeling better and tolerating diet.    His labs were significant for lipase of 1900 and CT abdomen showed acute inflammation of pancreatitis and US abdomen showed gall bladder sludge without evidence of cholelithiasis. Patient received 2.7L of IVF in ED.     (21 Sep 2020 03:31)   MEDICATIONS  (STANDING):  bisacodyl 5 milliGRAM(s) Oral at bedtime  enoxaparin Injectable 40 milliGRAM(s) SubCutaneous at bedtime  folic acid 1 milliGRAM(s) Oral daily  multivitamin 1 Tablet(s) Oral daily  senna 2 Tablet(s) Oral at bedtime  thiamine 100 milliGRAM(s) Oral daily    MEDICATIONS  (PRN):  acetaminophen   Tablet .. 650 milliGRAM(s) Oral every 6 hours PRN Mild Pain (1 - 3)  ondansetron Injectable 4 milliGRAM(s) IV Push every 6 hours PRN Nausea  oxycodone    5 mG/acetaminophen 325 mG 1 Tablet(s) Oral every 6 hours PRN Severe Pain (7 - 10)    INTERVAL EVENTS: Patient seen today, chart reviewed. Patient states he is feeling better today, less pain, started to pass gas.    Vital Signs Last 24 Hrs  T(C): 36.7 (23 Sep 2020 04:57), Max: 37.5 (22 Sep 2020 13:58)  T(F): 98.1 (23 Sep 2020 04:57), Max: 99.5 (22 Sep 2020 13:58)  HR: 72 (23 Sep 2020 04:57) (72 - 87)  BP: 90/53 (23 Sep 2020 04:57) (90/53 - 112/69)  BP(mean): --  RR: 18 (23 Sep 2020 04:57) (18 - 19)  SpO2: 95% (23 Sep 2020 08:00) (95% - 95%)    PHYSICAL EXAM:  GENERAL: NAD  NECK: Supple, No JVD  CHEST/LUNG: Clear; Decreased effort  HEART: S1, S2, Regular rate and rhythm  ABDOMEN: Soft, Mildly tender,  Bowel sounds present  EXTREMITIES: No clubbing, cyanosis, or edema      LABS:                          11.5   9.27  )-----------( 184      ( 23 Sep 2020 06:51 )             34.8                       12.8   14.26 )-----------( 216      ( 22 Sep 2020 06:33 )             38.1   09-23    142  |  108  |  7<L>  ----------------------------<  89  4.0   |  29  |  0.7    Ca    8.0<L>      23 Sep 2020 06:51  Mg     1.9     09-23    TPro  4.6<L>  /  Alb  2.8<L>  /  TBili  0.6  /  DBili  x   /  AST  11  /  ALT  7   /  AlkPhos  48  09-23                09-22    138  |  101  |  12  ----------------------------<  81  3.9   |  28  |  0.7    Ca    8.6      22 Sep 2020 06:33  Mg     1.7     09-22    TPro  5.2<L>  /  Alb  3.2<L>  /  TBili  0.9  /  DBili  x   /  AST  14  /  ALT  7   /  AlkPhos  48  09-22    LIVER FUNCTIONS - ( 22 Sep 2020 06:33 )  Alb: 3.2 g/dL / Pro: 5.2 g/dL / ALK PHOS: 48 U/L / ALT: 7 U/L / AST: 14 U/L / GGT: x                   PT/INR - ( 20 Sep 2020 20:59 )   PT: 12.40 sec;   INR: 1.08 ratio         PTT - ( 20 Sep 2020 20:59 )  PTT:28.8 sec    l    Urinalysis Basic - ( 21 Sep 2020 00:00 )    Color: Light Yellow / Appearance: Clear / SG: >1.050 / pH: x  Gluc: x / Ketone: Negative  / Bili: Negative / Urobili: <2 mg/dL   Blood: x / Protein: Trace / Nitrite: Negative   Leuk Esterase: Negative / RBC: x / WBC x   Sq Epi: x / Non Sq Epi: x / Bacteria: x    Lipid Profile (09.21.20 @ 11:13)   Total Cholesterol/HDL Ratio Measurement: 3.7 Ratio   Cholesterol, Serum: 180 mg/dL   Triglycerides, Serum: 70 mg/dL Lipase, Serum (09.20.20 @ 20:44)       Lipase, Serum: 1918: CALLED TO/READ BACK BY /PA REYES @ 10:15PM 9/20/20 U/L   Culture - Urine (collected 21 Sep 2020 00:00)  Source: .Urine Clean Catch (Midstream)  Final Report (22 Sep 2020 08:42):    <10,000 CFU/mL Normal Urogenital Nita      Culture - Blood (collected 20 Sep 2020 20:44)  Source: .Blood Blood-Peripheral  Preliminary Report (22 Sep 2020 02:12):    No growth to date.    Culture - Blood (collected 20 Sep 2020 20:44)  Source: .Blood Blood-Peripheral  Preliminary Report (22 Sep 2020 02:12):    No growth to date.      RADIOLOGY & ADDITIONAL TESTS:  ra< from: CT Abdomen and Pelvis w/ IV Cont (09.20.20 @ 21:49) >    PROCEDURE DATE:  09/20/2020            INTERPRETATION:  CLINICAL STATEMENT: Periumbilical pain. History of hernia repair.    TECHNIQUE: Contiguous axial CT images were obtained from the lower chest to the pubic symphysis following administration of 100cc intravenous contrast.  Oral contrast was not administered.  Reformatted images in the coronal and sagittal planes were acquired.    COMPARISON CT: None.      FINDINGS:    LOWER CHEST: Trace bilateral dependent atelectasis.    HEPATOBILIARY: Unremarkable.    SPLEEN: Unremarkable.    PANCREAS: There is diffuse pancreatic edema with peripancreatic inflammation and fluid. Focal ductal dilatation in the pancreatic head to 7 mm. 2.2 x 1.5 cm cystic focusis in the uncinate process.    ADRENAL GLANDS: Indeterminate 1.2 cm left adrenal gland nodule..    KIDNEYS: Symmetric renal enhancement. No hydronephrosis. Small left parapelvic cysts.    ABDOMINOPELVIC NODES: Prominent but subcentimeter peripancreatic lymph nodes, likely reactive.    PELVIC ORGANS: Unremarkable.    PERITONEUM/MESENTERY/BOWEL: No evidence of bowel obstruction or pneumoperitoneum. Normal appendix.    BONES/SOFT TISSUES: No acute osseous abnormality. Degenerative changes of the spine. L1 limbus vertebra. Postumbilical hernia repair with adjacent 2 cm periumbilical fat-containing hernia. Small bilateral fat-containing inguinal hernias also seen.    OTHER: Normal caliber aorta.      IMPRESSION:    Acute pancreatitis. 2.2 x 1.5 cmcystic focus in the uncinate process favored to reflect dilated side branches rather than acute pancreatic collection. Nonspecific focal dilatation of the main pancreatic duct to 7 mm in the pancreatic head without upstream atrophy. Suggest follow-upshort interval pancreatic MRI once acute pathology resolves.    1.2 cm indeterminate left adrenal gland nodule can be evaluated on the aforementioned suggested pancreatic MRI        < end of copied text >   Chart reviewed, patient examined. Pertinent results reviewed.  Case discussed with HO; specialist f/u reviewed  HD#3 (ED late 9/20)  JEFF AHMADI    HPI:  This is a 55 years old male with no significant past medical history who presented to ED with worsening epigastric pain.    Patient was fine till this morning hen he developed severe epigastric pain after eating a protein bar. Pain was sudden in onset, continuous located in epigastrium  worsening gradually to 10/10 intensity, non-radiating associated with nausa and 3 episodes of NBNB vomiting. Patient reports no relieving factor but endorses that food intake made worse. Patient denies, headache, dizziness, chest pain, SOB, constipation, diarrha, alcohol intake. Since admission, & NPO time- pt is feeling better and tolerating diet.    His labs were significant for lipase of 1900 and CT abdomen showed acute inflammation of pancreatitis and US abdomen showed gall bladder sludge without evidence of cholelithiasis. Patient received 2.7L of IVF in ED.    MEDICATIONS  (STANDING):  bisacodyl 5 milliGRAM(s) Oral at bedtime  enoxaparin Injectable 40 milliGRAM(s) SubCutaneous at bedtime  folic acid 1 milliGRAM(s) Oral daily  multivitamin 1 Tablet(s) Oral daily  senna 2 Tablet(s) Oral at bedtime  thiamine 100 milliGRAM(s) Oral daily    MEDICATIONS  (PRN):  acetaminophen   Tablet .. 650 milliGRAM(s) Oral every 6 hours PRN Mild Pain (1 - 3)  ondansetron Injectable 4 milliGRAM(s) IV Push every 6 hours PRN Nausea  oxycodone    5 mG/acetaminophen 325 mG 1 Tablet(s) Oral every 6 hours PRN Severe Pain (7 - 10)      INTERVAL EVENTS: Patient seen today, chart reviewed. Patient states he is feeling better today, less pain, started to pass gas.    Vital Signs Last 24 Hrs  T(C): 36.7 (23 Sep 2020 04:57), Max: 37.5 (22 Sep 2020 13:58)  T(F): 98.1 (23 Sep 2020 04:57), Max: 99.5 (22 Sep 2020 13:58)  HR: 72 (23 Sep 2020 04:57) (72 - 87)  BP: 90/53 (23 Sep 2020 04:57) (90/53 - 112/69)  BP(mean): --  RR: 18 (23 Sep 2020 04:57) (18 - 19)  SpO2: 95% (23 Sep 2020 08:00) (95% - 95%)    PHYSICAL EXAM:  GENERAL: NAD; overweight  NECK: Supple, No JVD  CHEST/LUNG: Clear; Decreased effort  HEART: RRR, no MRG  ABDOMEN: obese;  Soft, Mildly tender,  Bowel sounds present; no R/G/M  EXTREMITIES: No clubbing, cyanosis, or edema      LABS:                          11.5   9.27  )-----------( 184      ( 23 Sep 2020 06:51 )             34.8                       12.8   14.26 )-----------( 216      ( 22 Sep 2020 06:33 )             38.1   09-23    142  |  108  |  7<L>  ----------------------------<  89  4.0   |  29  |  0.7    Ca    8.0<L>      23 Sep 2020 06:51  Mg     1.9     09-23    TPro  4.6<L>  /  Alb  2.8<L>  /  TBili  0.6  /  DBili  x   /  AST  11  /  ALT  7   /  AlkPhos  48  09-23                09-22    138  |  101  |  12  ----------------------------<  81  3.9   |  28  |  0.7    Ca    8.6      22 Sep 2020 06:33  Mg     1.7     09-22    TPro  5.2<L>  /  Alb  3.2<L>  /  TBili  0.9  /  DBili  x   /  AST  14  /  ALT  7   /  AlkPhos  48  09-22    LIVER FUNCTIONS - ( 22 Sep 2020 06:33 )  Alb: 3.2 g/dL / Pro: 5.2 g/dL / ALK PHOS: 48 U/L / ALT: 7 U/L / AST: 14 U/L / GGT: x                   PT/INR - ( 20 Sep 2020 20:59 )   PT: 12.40 sec;   INR: 1.08 ratio         PTT - ( 20 Sep 2020 20:59 )  PTT:28.8 sec    l    Urinalysis Basic - ( 21 Sep 2020 00:00 )    Color: Light Yellow / Appearance: Clear / SG: >1.050 / pH: x  Gluc: x / Ketone: Negative  / Bili: Negative / Urobili: <2 mg/dL   Blood: x / Protein: Trace / Nitrite: Negative   Leuk Esterase: Negative / RBC: x / WBC x   Sq Epi: x / Non Sq Epi: x / Bacteria: x    Lipid Profile (09.21.20 @ 11:13)   Total Cholesterol/HDL Ratio Measurement: 3.7 Ratio   Cholesterol, Serum: 180 mg/dL   Triglycerides, Serum: 70 mg/dL Lipase, Serum (09.20.20 @ 20:44)       Lipase, Serum: 1918: CALLED TO/READ BACK BY /PA REYES @ 10:15PM 9/20/20 U/L   Culture - Urine (collected 21 Sep 2020 00:00)  Source: .Urine Clean Catch (Midstream)  Final Report (22 Sep 2020 08:42):    <10,000 CFU/mL Normal Urogenital Nita      Culture - Blood (collected 20 Sep 2020 20:44)  Source: .Blood Blood-Peripheral  Preliminary Report (22 Sep 2020 02:12):    No growth to date.    Culture - Blood (collected 20 Sep 2020 20:44)  Source: .Blood Blood-Peripheral  Preliminary Report (22 Sep 2020 02:12):    No growth to date.      RADIOLOGY & ADDITIONAL TESTS:  ra< from: CT Abdomen and Pelvis w/ IV Cont (09.20.20 @ 21:49) >    PROCEDURE DATE:  09/20/2020            INTERPRETATION:  CLINICAL STATEMENT: Periumbilical pain. History of hernia repair.    TECHNIQUE: Contiguous axial CT images were obtained from the lower chest to the pubic symphysis following administration of 100cc intravenous contrast.  Oral contrast was not administered.  Reformatted images in the coronal and sagittal planes were acquired.    COMPARISON CT: None.      FINDINGS:    LOWER CHEST: Trace bilateral dependent atelectasis.    HEPATOBILIARY: Unremarkable.    SPLEEN: Unremarkable.    PANCREAS: There is diffuse pancreatic edema with peripancreatic inflammation and fluid. Focal ductal dilatation in the pancreatic head to 7 mm. 2.2 x 1.5 cm cystic focusis in the uncinate process.    ADRENAL GLANDS: Indeterminate 1.2 cm left adrenal gland nodule..    KIDNEYS: Symmetric renal enhancement. No hydronephrosis. Small left parapelvic cysts.    ABDOMINOPELVIC NODES: Prominent but subcentimeter peripancreatic lymph nodes, likely reactive.    PELVIC ORGANS: Unremarkable.    PERITONEUM/MESENTERY/BOWEL: No evidence of bowel obstruction or pneumoperitoneum. Normal appendix.    BONES/SOFT TISSUES: No acute osseous abnormality. Degenerative changes of the spine. L1 limbus vertebra. Postumbilical hernia repair with adjacent 2 cm periumbilical fat-containing hernia. Small bilateral fat-containing inguinal hernias also seen.    OTHER: Normal caliber aorta.      IMPRESSION:    Acute pancreatitis. 2.2 x 1.5 cmcystic focus in the uncinate process favored to reflect dilated side branches rather than acute pancreatic collection. Nonspecific focal dilatation of the main pancreatic duct to 7 mm in the pancreatic head without upstream atrophy. Suggest follow-upshort interval pancreatic MRI once acute pathology resolves.    1.2 cm indeterminate left adrenal gland nodule can be evaluated on the aforementioned suggested pancreatic MRI        < end of copied text >

## 2020-09-23 NOTE — DISCHARGE NOTE PROVIDER - CARE PROVIDER_API CALL
[place] [address]  Phone: [phone]  Fax: [phone]  Follow Up Time:     Robert Pan)  Gastroenterology; Internal Medicine  [address] [place] [address]  Phone: [phone]  Fax: [phone]  Follow Up Time:

## 2020-09-26 LAB
CULTURE RESULTS: SIGNIFICANT CHANGE UP
CULTURE RESULTS: SIGNIFICANT CHANGE UP
SPECIMEN SOURCE: SIGNIFICANT CHANGE UP
SPECIMEN SOURCE: SIGNIFICANT CHANGE UP

## 2020-09-28 DIAGNOSIS — E66.9 OBESITY, UNSPECIFIED: ICD-10-CM

## 2020-09-28 DIAGNOSIS — Z87.891 PERSONAL HISTORY OF NICOTINE DEPENDENCE: ICD-10-CM

## 2020-09-28 DIAGNOSIS — E27.8 OTHER SPECIFIED DISORDERS OF ADRENAL GLAND: ICD-10-CM

## 2020-09-28 DIAGNOSIS — E83.42 HYPOMAGNESEMIA: ICD-10-CM

## 2020-09-28 DIAGNOSIS — K85.90 ACUTE PANCREATITIS WITHOUT NECROSIS OR INFECTION, UNSPECIFIED: ICD-10-CM

## 2020-09-28 DIAGNOSIS — F10.11 ALCOHOL ABUSE, IN REMISSION: ICD-10-CM

## 2020-10-01 ENCOUNTER — INPATIENT (INPATIENT)
Facility: HOSPITAL | Age: 55
LOS: 6 days | Discharge: HOME | End: 2020-10-08
Attending: INTERNAL MEDICINE | Admitting: INTERNAL MEDICINE
Payer: MEDICAID

## 2020-10-01 VITALS
OXYGEN SATURATION: 98 % | RESPIRATION RATE: 20 BRPM | DIASTOLIC BLOOD PRESSURE: 76 MMHG | TEMPERATURE: 99 F | SYSTOLIC BLOOD PRESSURE: 142 MMHG | HEART RATE: 75 BPM

## 2020-10-01 LAB
ALBUMIN SERPL ELPH-MCNC: 4.4 G/DL — SIGNIFICANT CHANGE UP (ref 3.5–5.2)
ALP SERPL-CCNC: 64 U/L — SIGNIFICANT CHANGE UP (ref 30–115)
ALT FLD-CCNC: 18 U/L — SIGNIFICANT CHANGE UP (ref 0–41)
ANION GAP SERPL CALC-SCNC: 10 MMOL/L — SIGNIFICANT CHANGE UP (ref 7–14)
AST SERPL-CCNC: 19 U/L — SIGNIFICANT CHANGE UP (ref 0–41)
BASOPHILS # BLD AUTO: 0.04 K/UL — SIGNIFICANT CHANGE UP (ref 0–0.2)
BASOPHILS NFR BLD AUTO: 0.5 % — SIGNIFICANT CHANGE UP (ref 0–1)
BILIRUB SERPL-MCNC: 0.3 MG/DL — SIGNIFICANT CHANGE UP (ref 0.2–1.2)
BUN SERPL-MCNC: 14 MG/DL — SIGNIFICANT CHANGE UP (ref 10–20)
CALCIUM SERPL-MCNC: 9.7 MG/DL — SIGNIFICANT CHANGE UP (ref 8.5–10.1)
CHLORIDE SERPL-SCNC: 102 MMOL/L — SIGNIFICANT CHANGE UP (ref 98–110)
CHOLEST SERPL-MCNC: 218 MG/DL — HIGH (ref 100–200)
CO2 SERPL-SCNC: 29 MMOL/L — SIGNIFICANT CHANGE UP (ref 17–32)
CREAT SERPL-MCNC: 0.9 MG/DL — SIGNIFICANT CHANGE UP (ref 0.7–1.5)
EOSINOPHIL # BLD AUTO: 0.08 K/UL — SIGNIFICANT CHANGE UP (ref 0–0.7)
EOSINOPHIL NFR BLD AUTO: 1 % — SIGNIFICANT CHANGE UP (ref 0–8)
GLUCOSE SERPL-MCNC: 107 MG/DL — HIGH (ref 70–99)
HCT VFR BLD CALC: 42.3 % — SIGNIFICANT CHANGE UP (ref 42–52)
HDLC SERPL-MCNC: 43 MG/DL — SIGNIFICANT CHANGE UP
HGB BLD-MCNC: 13.8 G/DL — LOW (ref 14–18)
IMM GRANULOCYTES NFR BLD AUTO: 0.4 % — HIGH (ref 0.1–0.3)
LACTATE SERPL-SCNC: 1.3 MMOL/L — SIGNIFICANT CHANGE UP (ref 0.7–2)
LIDOCAIN IGE QN: >3000 U/L — HIGH (ref 7–60)
LIPID PNL WITH DIRECT LDL SERPL: 154 MG/DL — HIGH (ref 4–129)
LYMPHOCYTES # BLD AUTO: 1.69 K/UL — SIGNIFICANT CHANGE UP (ref 1.2–3.4)
LYMPHOCYTES # BLD AUTO: 22.1 % — SIGNIFICANT CHANGE UP (ref 20.5–51.1)
MCHC RBC-ENTMCNC: 30 PG — SIGNIFICANT CHANGE UP (ref 27–31)
MCHC RBC-ENTMCNC: 32.6 G/DL — SIGNIFICANT CHANGE UP (ref 32–37)
MCV RBC AUTO: 92 FL — SIGNIFICANT CHANGE UP (ref 80–94)
MONOCYTES # BLD AUTO: 0.72 K/UL — HIGH (ref 0.1–0.6)
MONOCYTES NFR BLD AUTO: 9.4 % — HIGH (ref 1.7–9.3)
NEUTROPHILS # BLD AUTO: 5.07 K/UL — SIGNIFICANT CHANGE UP (ref 1.4–6.5)
NEUTROPHILS NFR BLD AUTO: 66.6 % — SIGNIFICANT CHANGE UP (ref 42.2–75.2)
NRBC # BLD: 0 /100 WBCS — SIGNIFICANT CHANGE UP (ref 0–0)
PLATELET # BLD AUTO: 336 K/UL — SIGNIFICANT CHANGE UP (ref 130–400)
POTASSIUM SERPL-MCNC: 4.2 MMOL/L — SIGNIFICANT CHANGE UP (ref 3.5–5)
POTASSIUM SERPL-SCNC: 4.2 MMOL/L — SIGNIFICANT CHANGE UP (ref 3.5–5)
PROT SERPL-MCNC: 7 G/DL — SIGNIFICANT CHANGE UP (ref 6–8)
RBC # BLD: 4.6 M/UL — LOW (ref 4.7–6.1)
RBC # FLD: 12.5 % — SIGNIFICANT CHANGE UP (ref 11.5–14.5)
SODIUM SERPL-SCNC: 141 MMOL/L — SIGNIFICANT CHANGE UP (ref 135–146)
TOTAL CHOLESTEROL/HDL RATIO MEASUREMENT: 5.1 RATIO — SIGNIFICANT CHANGE UP (ref 4–5.5)
TRIGL SERPL-MCNC: 130 MG/DL — SIGNIFICANT CHANGE UP (ref 10–149)
WBC # BLD: 7.63 K/UL — SIGNIFICANT CHANGE UP (ref 4.8–10.8)
WBC # FLD AUTO: 7.63 K/UL — SIGNIFICANT CHANGE UP (ref 4.8–10.8)

## 2020-10-01 PROCEDURE — 76705 ECHO EXAM OF ABDOMEN: CPT | Mod: 26

## 2020-10-01 PROCEDURE — 99285 EMERGENCY DEPT VISIT HI MDM: CPT

## 2020-10-01 PROCEDURE — 99223 1ST HOSP IP/OBS HIGH 75: CPT | Mod: AI

## 2020-10-01 RX ORDER — SENNA PLUS 8.6 MG/1
2 TABLET ORAL AT BEDTIME
Refills: 0 | Status: DISCONTINUED | OUTPATIENT
Start: 2020-10-01 | End: 2020-10-08

## 2020-10-01 RX ORDER — MORPHINE SULFATE 50 MG/1
2 CAPSULE, EXTENDED RELEASE ORAL EVERY 4 HOURS
Refills: 0 | Status: DISCONTINUED | OUTPATIENT
Start: 2020-10-01 | End: 2020-10-04

## 2020-10-01 RX ORDER — INFLUENZA VIRUS VACCINE 15; 15; 15; 15 UG/.5ML; UG/.5ML; UG/.5ML; UG/.5ML
0.5 SUSPENSION INTRAMUSCULAR ONCE
Refills: 0 | Status: DISCONTINUED | OUTPATIENT
Start: 2020-10-01 | End: 2020-10-08

## 2020-10-01 RX ORDER — POLYETHYLENE GLYCOL 3350 17 G/17G
17 POWDER, FOR SOLUTION ORAL AT BEDTIME
Refills: 0 | Status: DISCONTINUED | OUTPATIENT
Start: 2020-10-01 | End: 2020-10-08

## 2020-10-01 RX ORDER — SODIUM CHLORIDE 9 MG/ML
1000 INJECTION INTRAMUSCULAR; INTRAVENOUS; SUBCUTANEOUS ONCE
Refills: 0 | Status: COMPLETED | OUTPATIENT
Start: 2020-10-01 | End: 2020-10-01

## 2020-10-01 RX ORDER — ENOXAPARIN SODIUM 100 MG/ML
40 INJECTION SUBCUTANEOUS AT BEDTIME
Refills: 0 | Status: DISCONTINUED | OUTPATIENT
Start: 2020-10-01 | End: 2020-10-04

## 2020-10-01 RX ORDER — MORPHINE SULFATE 50 MG/1
4 CAPSULE, EXTENDED RELEASE ORAL ONCE
Refills: 0 | Status: DISCONTINUED | OUTPATIENT
Start: 2020-10-01 | End: 2020-10-01

## 2020-10-01 RX ORDER — FOLIC ACID 0.8 MG
1 TABLET ORAL DAILY
Refills: 0 | Status: DISCONTINUED | OUTPATIENT
Start: 2020-10-01 | End: 2020-10-08

## 2020-10-01 RX ORDER — SODIUM CHLORIDE 9 MG/ML
1000 INJECTION, SOLUTION INTRAVENOUS
Refills: 0 | Status: DISCONTINUED | OUTPATIENT
Start: 2020-10-01 | End: 2020-10-04

## 2020-10-01 RX ORDER — THIAMINE MONONITRATE (VIT B1) 100 MG
100 TABLET ORAL DAILY
Refills: 0 | Status: DISCONTINUED | OUTPATIENT
Start: 2020-10-01 | End: 2020-10-08

## 2020-10-01 RX ORDER — MORPHINE SULFATE 50 MG/1
8 CAPSULE, EXTENDED RELEASE ORAL ONCE
Refills: 0 | Status: DISCONTINUED | OUTPATIENT
Start: 2020-10-01 | End: 2020-10-01

## 2020-10-01 RX ADMIN — SODIUM CHLORIDE 1000 MILLILITER(S): 9 INJECTION INTRAMUSCULAR; INTRAVENOUS; SUBCUTANEOUS at 11:57

## 2020-10-01 RX ADMIN — SODIUM CHLORIDE 150 MILLILITER(S): 9 INJECTION, SOLUTION INTRAVENOUS at 19:46

## 2020-10-01 RX ADMIN — MORPHINE SULFATE 2 MILLIGRAM(S): 50 CAPSULE, EXTENDED RELEASE ORAL at 20:00

## 2020-10-01 RX ADMIN — ENOXAPARIN SODIUM 40 MILLIGRAM(S): 100 INJECTION SUBCUTANEOUS at 21:54

## 2020-10-01 RX ADMIN — MORPHINE SULFATE 8 MILLIGRAM(S): 50 CAPSULE, EXTENDED RELEASE ORAL at 11:57

## 2020-10-01 RX ADMIN — MORPHINE SULFATE 4 MILLIGRAM(S): 50 CAPSULE, EXTENDED RELEASE ORAL at 17:03

## 2020-10-01 RX ADMIN — SODIUM CHLORIDE 2000 MILLILITER(S): 9 INJECTION INTRAMUSCULAR; INTRAVENOUS; SUBCUTANEOUS at 16:49

## 2020-10-01 RX ADMIN — MORPHINE SULFATE 4 MILLIGRAM(S): 50 CAPSULE, EXTENDED RELEASE ORAL at 18:37

## 2020-10-01 RX ADMIN — MORPHINE SULFATE 2 MILLIGRAM(S): 50 CAPSULE, EXTENDED RELEASE ORAL at 19:45

## 2020-10-01 NOTE — ED ADULT NURSE NOTE - NSIMPLEMENTINTERV_GEN_ALL_ED
Implemented All Universal Safety Interventions:  [place] to call system. Call bell, personal items and telephone within reach. Instruct patient to call for assistance. Room bathroom lighting operational. Non-slip footwear when patient is off stretcher. Physically safe environment: no spills, clutter or unnecessary equipment. Stretcher in lowest position, wheels locked, appropriate side rails in place.

## 2020-10-01 NOTE — ED PROVIDER NOTE - OBJECTIVE STATEMENT
54 y/o M with pmh of recurrent pancreatitis (3x within the past month) presenting for abdominal pain. Pain is located in the epigastric area. Started around 9am this morning and has been worsening since then. Describes it as a burning sensation, pain is severe. Had 1 episode of bilious vomiting this morning. No fever/chills, CP, or SOB. No BM changes. Last admitted here for pancreatitis on Sept. 20th. Last hx of alcohol use was over 20 years ago. Does not currently drink. 54 y/o M with pmh of recurrent pancreatitis (3x within the past month) presenting for abdominal pain. Pain is located in the epigastric area. Started around 9am this morning and has been worsening since then. Describes it as a burning sensation, pain is severe. Had 1 episode of bilious vomiting this morning. No fever/chills, CP, SOB, or dizziness. No BM changes. States that he has not been eating much due to the abdominal pain. Last admitted here for pancreatitis on Sept. 20th. He says that this feels like the previous times when he was admitted for it. Last hx of alcohol use was over 20 years ago. Does not currently drink.

## 2020-10-01 NOTE — H&P ADULT - ATTENDING COMMENTS
54 YO M with a PMH of obesity and recurrent pancreatitis and was diagnosed w/ idiopathic pancreatitis who presents to the hospital with ac/o epigastric pain that started today at 0900. Described as epigastric, sharp, and radiates towards back. Denies any hematemesis or black/bloody stools. In the ED, Lipase > 3k and RUQ US was inconclusive. Started on IVFs and PRN pain meds.     Physical exam shows pt in NAD. VSS, afebrile, not hypoxic on RA. A&Ox3. Non-focal neuro exam. Muscle strength/sensation intact. CTA B/L with no W/C/R. RRR, no M/G/R. ABD with TTP in the RUQ, normoactive BSs. LEs without swelling. No rashes. Labs and radiology as above.     ABD pain due to acute on chronic idiopathic pancreatitis. NPO. Send lipid panel. IVFs (LR). PRN pain meds. PRN anti-emetics. GI consult.    Hx of obesity. Restart home meds. DVT PPX. Inform PCP of pt's admission to hospital. My note supersedes the residents note. 19

## 2020-10-01 NOTE — H&P ADULT - HISTORY OF PRESENT ILLNESS
54 yo male with recurrent pancreatitis and was diagnosed w/ idiopathic pancreatitis (last discharged Sept 23) who presented to ED with worsening epigastric pain. Pt started having epigastric pain since ~9AM today. Pain is severe. No aggravating or alleviating factors. No fever. No vomiting. No chest pain. No SOB. No bm changes.     Of note, pt was supposed to f/u GI for outpatient MRCP.    Vital Signs Last 24 Hrs  T(C): 36.9 (01 Oct 2020 16:09), Max: 37.1 (01 Oct 2020 10:43)  T(F): 98.5 (01 Oct 2020 16:09), Max: 98.7 (01 Oct 2020 10:43)  HR: 83 (01 Oct 2020 16:09) (75 - 83)  BP: 117/74 (01 Oct 2020 16:09) (117/74 - 142/76)  BP(mean): 90 (01 Oct 2020 16:09) (90 - 90)  RR: 20 (01 Oct 2020 16:09) (20 - 20)  SpO2: 99% (01 Oct 2020 16:09) (98% - 99%)    In ED, pt found to have lipase > 3000. Pt admitted for pancreatitis.          54 yo male with recurrent pancreatitis and was diagnosed w/ idiopathic pancreatitis (last discharged Sept 23) who presented to ED with worsening epigastric pain. Pt started having epigastric pain since ~9AM today. Pain is severe. No aggravating or alleviating factors. No fever. No vomiting. No chest pain. No SOB. No bm changes. Pt states since his discharge, his abdominal pain has improved until today. States he been doing low fat diet.     Of note, pt was supposed to f/u GI for outpatient MRCP, scheduled.    Vital Signs Last 24 Hrs  T(C): 36.9 (01 Oct 2020 16:09), Max: 37.1 (01 Oct 2020 10:43)  T(F): 98.5 (01 Oct 2020 16:09), Max: 98.7 (01 Oct 2020 10:43)  HR: 83 (01 Oct 2020 16:09) (75 - 83)  BP: 117/74 (01 Oct 2020 16:09) (117/74 - 142/76)  BP(mean): 90 (01 Oct 2020 16:09) (90 - 90)  RR: 20 (01 Oct 2020 16:09) (20 - 20)  SpO2: 99% (01 Oct 2020 16:09) (98% - 99%)    In ED, pt found to have lipase > 3000. Pt admitted for pancreatitis.          56 yo male with recurrent pancreatitis and was diagnosed w/ idiopathic pancreatitis (last discharged Sept 23) who presented to ED with worsening epigastric pain. Pt started having epigastric pain since ~9AM today. Pain is severe. No aggravating or alleviating factors. No fever. No vomiting. No chest pain. No SOB. No bm changes. Pt states since his discharge, his abdominal pain has improved until today. States he been on low fat diet.     Of note, pt was supposed to f/u GI for outpatient MRCP. Scheduled but not done.    Vital Signs Last 24 Hrs  T(C): 36.9 (01 Oct 2020 16:09), Max: 37.1 (01 Oct 2020 10:43)  T(F): 98.5 (01 Oct 2020 16:09), Max: 98.7 (01 Oct 2020 10:43)  HR: 83 (01 Oct 2020 16:09) (75 - 83)  BP: 117/74 (01 Oct 2020 16:09) (117/74 - 142/76)  BP(mean): 90 (01 Oct 2020 16:09) (90 - 90)  RR: 20 (01 Oct 2020 16:09) (20 - 20)  SpO2: 99% (01 Oct 2020 16:09) (98% - 99%)    In ED, pt found to have lipase > 3000. Pt admitted for pancreatitis.

## 2020-10-01 NOTE — ED PROVIDER NOTE - PROGRESS NOTE DETAILS
ATTENDING NOTE: 56 y/o male with hx of pancreatitis, c/o epigatric pain since ~9AM. Pain is severe. No aggravating or alleviating factors. Was diagnosed with idiopathic pancreatitis 5 weeks ago. Has had three episodes since. No fever. No vomiting. No chest pain. No SOB. Had RUQ U/S --> sludge, no stones. CT with possible cyst of the pancreas. Awaiting MRI.  O/E: Constitutional: Uncomfortable from pain. Eyes: No pallor, no jaundice. Neck: Neck supple, no meningeal signs. Respiratory: Lungs CTA and equal b/l. Cardio: S1 S2 regular, no murmur. ABD: ABD soft, + epigastric tenderness, no RUQ tenderness, no guarding/rebound. Extremities: No pedal edema, no calf tenderness. Skin: No skin rash. Neuro: No neurologic deficits.   Imp: Chronic pancreatitis. A/P: Analgesics, will check labs. Will likely require admission. ATTENDING NOTE: 54 y/o male with hx of pancreatitis, c/o epigastric pain since ~9AM. Pain is severe. No aggravating or alleviating factors. Was diagnosed with idiopathic pancreatitis 5 weeks ago. Has had three episodes since. No fever. No vomiting. No chest pain. No SOB. Had RUQ U/S --> sludge, no stones. CT with possible cyst of the pancreas. Awaiting MRI.  O/E: Constitutional: Uncomfortable from pain. Eyes: No pallor, no jaundice. Neck: Neck supple, no meningeal signs. Respiratory: Lungs CTA and equal b/l. Cardio: S1 S2 regular, no murmur. ABD: ABD soft, + epigastric tenderness, no RUQ tenderness, no guarding/rebound. Extremities: No pedal edema, no calf tenderness. Skin: No skin rash. Neuro: No neurologic deficits.   Imp: Chronic pancreatitis. A/P: Analgesics, will check labs. Will likely require admission.

## 2020-10-01 NOTE — H&P ADULT - NSHPLABSRESULTS_GEN_ALL_CORE
LABS:                          13.8   7.63  )-----------( 336      ( 01 Oct 2020 12:24 )             42.3     10-01    141  |  102  |  14  ----------------------------<  107<H>  4.2   |  29  |  0.9    Ca    9.7      01 Oct 2020 12:24    TPro  7.0  /  Alb  4.4  /  TBili  0.3  /  DBili  x   /  AST  19  /  ALT  18  /  AlkPhos  64  10-01              Lactate Trend  10-01 @ 12:24 Lactate:1.3         CAPILLARY BLOOD GLUCOSE        Culture Results:   <10,000 CFU/mL Normal Urogenital Nita (09-21 @ 00:00)  Culture Results:   No Growth Final (09-20 @ 20:44)  Culture Results:   No Growth Final (09-20 @ 20:44)    Lipase, Serum: >3000 U/L (10.01.20 @ 12:24)     RADIOLOGY:  d< from: CT Abdomen and Pelvis w/ IV Cont (09.20.20 @ 21:49) >    IMPRESSION:    Acute pancreatitis. 2.2 x 1.5 cmcystic focus in the uncinate process favored to reflect dilated side branches rather than acute pancreatic collection. Nonspecific focal dilatation of the main pancreatic duct to 7 mm in the pancreatic head without upstream atrophy. Suggest follow-upshort interval pancreatic MRI once acute pathology resolves.    1.2 cm indeterminate left adrenal gland nodule can be evaluated on the aforementioned suggested pancreatic MRI    < end of copied text >            EKGS:

## 2020-10-01 NOTE — H&P ADULT - NSHPPHYSICALEXAM_GEN_ALL_CORE
PHYSICAL EXAM:  GENERAL: NAD, AAO x 4, 55y M  HEAD:  Atraumatic, Normocephalic  EYES: EOMI, conjunctiva clear and sclera white  NECK: Supple, No JVD  CHEST/LUNG: Clear to auscultation bilaterally; No wheeze; No crackles; No accessory muscles used  HEART: Regular rate and rhythm; No murmurs;   ABDOMEN: + tender. Soft, Nondistended; Bowel sounds present; No guarding  EXTREMITIES:  2+ Peripheral Pulses, No cyanosis or edema  NEUROLOGY: non-focal PHYSICAL EXAM:  GENERAL: NAD, AAO x 4, 55y M  HEAD:  Atraumatic, Normocephalic  EYES: EOMI, conjunctiva clear and sclera white  NECK: Supple, No JVD  CHEST/LUNG: Clear to auscultation bilaterally; No wheeze; No crackles; No accessory muscles used  HEART: Regular rate and rhythm; No murmurs;   ABDOMEN: + tender RUQ. + queen sign. Soft, Nondistended; Bowel sounds present; No guarding  EXTREMITIES:  2+ Peripheral Pulses, No cyanosis or edema  NEUROLOGY: non-focal

## 2020-10-01 NOTE — H&P ADULT - ASSESSMENT
55 years old male with history of HLD, former tobacco use, quit within the past year, who presented to ED with worsening epigastric pain. Admitted for pancreatitis.    # Acute on chronic idiopathic pancreatitis with cyst formation in the uncinate process  - Acute onset abdominal pain, lipase >3000 (today) increased from 1900  - Unclear etiology, no stones on abd U/S, no ETOH hx, no new meds, TG and Ca wnl. Possibly due to a stricture??  - scheduled MRCP OP, consider inpatient MRCP  - f/u GI recommendation  - c/w fluid hydration w/ LR @ 150 for now   - Diet NPO for now  - pain has been controlled with IV morphine 2mg q2hrs for severe pain  - bowel regimen  - repeat Lipase in am    # Obesity  - diet counselling  - lipid profile wnl, TG 80 on last admission  - repeat lipid panel   - HBA1C: 5.9%    DVT ppx ; Lovenox  GI ppx : not needed  Ambulate as tolerated   Diet : NPO for now   Code Full   55 years old male with history of HLD, former tobacco use, quit within the past year, who presented to ED with worsening epigastric pain. Admitted for pancreatitis.    # Acute on chronic idiopathic pancreatitis with cyst formation in the uncinate process  - Acute onset abdominal pain, lipase >3000 (today) increased from 1900  - Unclear etiology, no stones on abd U/S, no ETOH hx, no new meds, TG and Ca wnl. Possibly due to a stricture??  - scheduled MRCP OP, consider inpatient MRCP  - will order US abdomen to assess biliary system  - f/u GI recommendation  - c/w fluid hydration w/ LR @ 150 for now   - Diet NPO for now  - pain has been controlled with IV morphine 2mg q2hrs for severe pain  - bowel regimen  - repeat Lipase in am    # Obesity  - diet counselling  - lipid profile wnl, TG 80 on last admission  - repeat lipid panel   - HBA1C: 5.9%    DVT ppx ; Lovenox  GI ppx : not needed  Ambulate as tolerated   Diet : NPO for now   Code Full   55 years old male with history of HLD, former tobacco use, quit within the past year, who presented to ED with worsening epigastric pain. Admitted for pancreatitis.    # Acute on chronic idiopathic pancreatitis with cyst formation in the uncinate process  - Acute onset abdominal pain, lipase >3000 (today) increased from 1900  - Unclear etiology, no stones on abd U/S, no ETOH hx, no new meds, TG and Ca wnl. Possibly due to a stricture??  - scheduled MRCP OP, consider inpatient MRCP  - will order US abdomen to assess biliary system  - f/u GI recommendation  - c/w fluid hydration w/ LR @ 150 for now   - Diet NPO for now  - pain has been controlled with IV morphine 2mg q4hrs for severe pain  - bowel regimen  - repeat Lipase in am    # Obesity  - diet counselling  - lipid profile wnl, TG 80 on last admission  - repeat lipid panel   - HBA1C: 5.9%    DVT ppx ; Lovenox  GI ppx : not needed  Ambulate as tolerated   Diet : NPO for now   Code Full

## 2020-10-01 NOTE — H&P ADULT - NSHPREVIEWOFSYSTEMS_GEN_ALL_CORE
REVIEW OF SYSTEMS:  CONSTITUTIONAL: No weakness, fevers or chills  EYES/ENT: No visual changes;  No vertigo or throat pain   NECK: No pain or stiffness  RESPIRATORY: No cough, wheezing, hemoptysis; No shortness of breath  CARDIOVASCULAR: No chest pain or palpitations  GASTROINTESTINAL: Admitted to epigastric pain. No nausea, vomiting, or hematemesis; No diarrhea or constipation. No melena or hematochezia.  GENITOURINARY: No dysuria, frequency or hematuria  NEUROLOGICAL: No numbness or weakness  SKIN: No itching, rashes

## 2020-10-01 NOTE — ED PROVIDER NOTE - NS ED ROS FT
Eyes:  No visual changes, eye pain or discharge.  ENMT:  No hearing changes, pain, discharge or infections. No neck pain or stiffness.  Cardiac:  No chest pain, SOB or edema. No chest pain with exertion.  Respiratory:  No cough or respiratory distress. No hemoptysis. No history of asthma or RAD.  GI:  +nausea/vomiting this AM, no bowel movement changes, +epigastric abdominal pain  :  No dysuria, frequency or burning.  MS:  No myalgia, muscle weakness, joint pain or back pain.  Neuro:  No headache or weakness.  No LOC.  Skin:  No skin rash.

## 2020-10-01 NOTE — ED PROVIDER NOTE - CLINICAL SUMMARY MEDICAL DECISION MAKING FREE TEXT BOX
Lipase > 3000. Consistent with recurrence of pancreatitis. Severe pain  requiring multiple doses of opioids. Made NPO. Given  IV fluids. Admitted to the hospital.

## 2020-10-01 NOTE — ED PROVIDER NOTE - GASTROINTESTINAL, MLM
Abdomen soft, non-distended, no guarding. Normal bowel sounds. Mild TTP in epigastric area and mid-abdomen.

## 2020-10-02 LAB
APTT BLD: 32.4 SEC — SIGNIFICANT CHANGE UP (ref 27–39.2)
BASOPHILS # BLD AUTO: 0.02 K/UL — SIGNIFICANT CHANGE UP (ref 0–0.2)
BASOPHILS NFR BLD AUTO: 0.3 % — SIGNIFICANT CHANGE UP (ref 0–1)
EOSINOPHIL # BLD AUTO: 0.2 K/UL — SIGNIFICANT CHANGE UP (ref 0–0.7)
EOSINOPHIL NFR BLD AUTO: 2.6 % — SIGNIFICANT CHANGE UP (ref 0–8)
ETHANOL SERPL-MCNC: <10 MG/DL — SIGNIFICANT CHANGE UP
HCT VFR BLD CALC: 41.9 % — LOW (ref 42–52)
HGB BLD-MCNC: 13.6 G/DL — LOW (ref 14–18)
IMM GRANULOCYTES NFR BLD AUTO: 0.3 % — SIGNIFICANT CHANGE UP (ref 0.1–0.3)
INR BLD: 1.12 RATIO — SIGNIFICANT CHANGE UP (ref 0.65–1.3)
LACTATE SERPL-SCNC: 0.6 MMOL/L — LOW (ref 0.7–2)
LYMPHOCYTES # BLD AUTO: 1.44 K/UL — SIGNIFICANT CHANGE UP (ref 1.2–3.4)
LYMPHOCYTES # BLD AUTO: 18.8 % — LOW (ref 20.5–51.1)
MCHC RBC-ENTMCNC: 29.8 PG — SIGNIFICANT CHANGE UP (ref 27–31)
MCHC RBC-ENTMCNC: 32.5 G/DL — SIGNIFICANT CHANGE UP (ref 32–37)
MCV RBC AUTO: 91.9 FL — SIGNIFICANT CHANGE UP (ref 80–94)
MONOCYTES # BLD AUTO: 0.54 K/UL — SIGNIFICANT CHANGE UP (ref 0.1–0.6)
MONOCYTES NFR BLD AUTO: 7.1 % — SIGNIFICANT CHANGE UP (ref 1.7–9.3)
NEUTROPHILS # BLD AUTO: 5.42 K/UL — SIGNIFICANT CHANGE UP (ref 1.4–6.5)
NEUTROPHILS NFR BLD AUTO: 70.9 % — SIGNIFICANT CHANGE UP (ref 42.2–75.2)
NRBC # BLD: 0 /100 WBCS — SIGNIFICANT CHANGE UP (ref 0–0)
PLATELET # BLD AUTO: 293 K/UL — SIGNIFICANT CHANGE UP (ref 130–400)
PROTHROM AB SERPL-ACNC: 12.9 SEC — HIGH (ref 9.95–12.87)
RBC # BLD: 4.56 M/UL — LOW (ref 4.7–6.1)
RBC # FLD: 12.6 % — SIGNIFICANT CHANGE UP (ref 11.5–14.5)
SARS-COV-2 IGG SERPL QL IA: NEGATIVE — SIGNIFICANT CHANGE UP
SARS-COV-2 IGM SERPL IA-ACNC: 0.11 INDEX — SIGNIFICANT CHANGE UP
SARS-COV-2 RNA SPEC QL NAA+PROBE: SIGNIFICANT CHANGE UP
WBC # BLD: 7.64 K/UL — SIGNIFICANT CHANGE UP (ref 4.8–10.8)
WBC # FLD AUTO: 7.64 K/UL — SIGNIFICANT CHANGE UP (ref 4.8–10.8)

## 2020-10-02 PROCEDURE — 74183 MRI ABD W/O CNTR FLWD CNTR: CPT | Mod: 26

## 2020-10-02 PROCEDURE — 99223 1ST HOSP IP/OBS HIGH 75: CPT

## 2020-10-02 RX ORDER — ACETAMINOPHEN 500 MG
650 TABLET ORAL EVERY 6 HOURS
Refills: 0 | Status: DISCONTINUED | OUTPATIENT
Start: 2020-10-02 | End: 2020-10-08

## 2020-10-02 RX ADMIN — MORPHINE SULFATE 2 MILLIGRAM(S): 50 CAPSULE, EXTENDED RELEASE ORAL at 08:50

## 2020-10-02 RX ADMIN — MORPHINE SULFATE 2 MILLIGRAM(S): 50 CAPSULE, EXTENDED RELEASE ORAL at 16:30

## 2020-10-02 RX ADMIN — MORPHINE SULFATE 2 MILLIGRAM(S): 50 CAPSULE, EXTENDED RELEASE ORAL at 04:04

## 2020-10-02 RX ADMIN — Medication 1 MILLIGRAM(S): at 11:49

## 2020-10-02 RX ADMIN — SODIUM CHLORIDE 150 MILLILITER(S): 9 INJECTION, SOLUTION INTRAVENOUS at 23:53

## 2020-10-02 RX ADMIN — SENNA PLUS 2 TABLET(S): 8.6 TABLET ORAL at 21:07

## 2020-10-02 RX ADMIN — POLYETHYLENE GLYCOL 3350 17 GRAM(S): 17 POWDER, FOR SOLUTION ORAL at 21:08

## 2020-10-02 RX ADMIN — SODIUM CHLORIDE 150 MILLILITER(S): 9 INJECTION, SOLUTION INTRAVENOUS at 07:02

## 2020-10-02 RX ADMIN — MORPHINE SULFATE 2 MILLIGRAM(S): 50 CAPSULE, EXTENDED RELEASE ORAL at 03:49

## 2020-10-02 RX ADMIN — Medication 1 TABLET(S): at 11:48

## 2020-10-02 RX ADMIN — SODIUM CHLORIDE 150 MILLILITER(S): 9 INJECTION, SOLUTION INTRAVENOUS at 03:49

## 2020-10-02 RX ADMIN — ENOXAPARIN SODIUM 40 MILLIGRAM(S): 100 INJECTION SUBCUTANEOUS at 21:08

## 2020-10-02 RX ADMIN — Medication 100 MILLIGRAM(S): at 11:49

## 2020-10-02 RX ADMIN — MORPHINE SULFATE 2 MILLIGRAM(S): 50 CAPSULE, EXTENDED RELEASE ORAL at 16:10

## 2020-10-02 RX ADMIN — MORPHINE SULFATE 2 MILLIGRAM(S): 50 CAPSULE, EXTENDED RELEASE ORAL at 09:10

## 2020-10-02 NOTE — PROGRESS NOTE ADULT - SUBJECTIVE AND OBJECTIVE BOX
EMELIAANASTACIOJEFF  55y  Male  HPI:  56 yo male with recurrent pancreatitis and was diagnosed w/ idiopathic pancreatitis (last discharged Sept 23) who presented to ED with worsening epigastric pain. Pt started having epigastric pain since ~9AM today. Pain is severe. No aggravating or alleviating factors. No fever. No vomiting. No chest pain. No SOB. No bm changes. Pt states since his discharge, his abdominal pain has improved until today. States he been on low fat diet.     Of note, pt was supposed to f/u GI for outpatient MRCP. Scheduled but not done.    Vital Signs Last 24 Hrs  T(C): 36.9 (01 Oct 2020 16:09), Max: 37.1 (01 Oct 2020 10:43)  T(F): 98.5 (01 Oct 2020 16:09), Max: 98.7 (01 Oct 2020 10:43)  HR: 83 (01 Oct 2020 16:09) (75 - 83)  BP: 117/74 (01 Oct 2020 16:09) (117/74 - 142/76)  BP(mean): 90 (01 Oct 2020 16:09) (90 - 90)  RR: 20 (01 Oct 2020 16:09) (20 - 20)  SpO2: 99% (01 Oct 2020 16:09) (98% - 99%)    In ED, pt found to have lipase > 3000. Pt admitted for pancreatitis.          (01 Oct 2020 18:04)    MEDICATIONS  (STANDING):  enoxaparin Injectable 40 milliGRAM(s) SubCutaneous at bedtime  folic acid 1 milliGRAM(s) Oral daily  influenza   Vaccine 0.5 milliLiter(s) IntraMuscular once  lactated ringers. 1000 milliLiter(s) (150 mL/Hr) IV Continuous <Continuous>  multivitamin 1 Tablet(s) Oral daily  polyethylene glycol 3350 17 Gram(s) Oral at bedtime  senna 2 Tablet(s) Oral at bedtime  thiamine 100 milliGRAM(s) Oral daily    MEDICATIONS  (PRN):  acetaminophen   Tablet .. 650 milliGRAM(s) Oral every 6 hours PRN Mild Pain (1 - 3)  morphine  - Injectable 2 milliGRAM(s) IV Push every 4 hours PRN Severe Pain (7 - 10)    INTERVAL EVENTS: Patient known to our service. Patient recently hospitalized with pancreatitis, discharged home with out patient f/u... never had MRCP done, was scheduled for next week.    T(C): 36.5 (10-02-20 @ 14:04), Max: 36.9 (10-01-20 @ 16:09)  HR: 78 (10-02-20 @ 14:04) (64 - 83)  BP: 109/63 (10-02-20 @ 14:04) (105/55 - 122/73)  RR: 18 (10-02-20 @ 14:04) (18 - 20)  SpO2: 98% (10-02-20 @ 06:35) (98% - 99%)  Wt(kg): --Vital Signs Last 24 Hrs  T(C): 36.5 (02 Oct 2020 14:04), Max: 36.9 (01 Oct 2020 16:09)  T(F): 97.7 (02 Oct 2020 14:04), Max: 98.5 (01 Oct 2020 16:09)  HR: 78 (02 Oct 2020 14:04) (64 - 83)  BP: 109/63 (02 Oct 2020 14:04) (105/55 - 122/73)  BP(mean): 90 (01 Oct 2020 16:09) (90 - 90)  RR: 18 (02 Oct 2020 14:04) (18 - 20)  SpO2: 98% (02 Oct 2020 06:35) (98% - 99%)    PHYSICAL EXAM:  GENERAL: NAD  NECK: Supple, No JVD  CHEST/LUNG: Clear,   HEART: S1, S2, Regular rate and rhythm  ABDOMEN: Soft, Mildly tender, No Bebound, Nondistended; Bowel sounds present  EXTREMITIES: No edema    LABS:                        13.6   7.64  )-----------( 293      ( 02 Oct 2020 06:00 )             41.9             10-02    141  |  104  |  10  ----------------------------<  89  4.1   |  28  |  0.9    Ca    8.8      02 Oct 2020 06:00  Phos  3.5     10-02  Mg     2.2     10-02    TPro  5.8<L>  /  Alb  3.5  /  TBili  0.5  /  DBili  x   /  AST  14  /  ALT  13  /  AlkPhos  64  10-02    LIVER FUNCTIONS - ( 02 Oct 2020 06:00 )  Alb: 3.5 g/dL / Pro: 5.8 g/dL / ALK PHOS: 64 U/L / ALT: 13 U/L / AST: 14 U/L / GGT: x                   PT/INR - ( 02 Oct 2020 06:00 )   PT: 12.90 sec;   INR: 1.12 ratio         PTT - ( 02 Oct 2020 06:00 )  PTT:32.4 sec      RADIOLOGY & ADDITIONAL TESTS:  < from: US Abdomen Limited (10.01.20 @ 22:14) >    FINDINGS:    Liver: Within normal limits.  Bile ducts: Normal caliber. Common bile duct measures 4 mm.  Gallbladder: Within normal limits.  Pancreas: Obscured by overlying bowel gas.  Right kidney: 12.1 cm. No hydronephrosis.  Ascites: None.  IVC: Visualized portions are within normal limits.    IMPRESSION:  No sonographic evidence of cholecystitis.  Pancreas obscured by overlying bowel gas.        < end of copied text >

## 2020-10-02 NOTE — CONSULT NOTE ADULT - CONSULT REQUESTED DATE/TIME
S: see above; NETTA/LV noted above. Tech note reviewed and accepted  Chief Complaint/History of Present Illness:     --NETTA 12/06, MP    Sudden onset new CENTRAL floater \"dark round spot/ thread\" OS x 3-4 days  NO flashes    vision stable OD  No (other) associated signs/symptoms   No other new visual complaints.    Current need(s) for glasses reviewed:   --over-the-counter RO prn    Use of eye gtts reviewed: NONE    MEDS verified   PAST MEDICAL HISTORY/PROBLEMS/PAST SURGICAL HISTORY/FAMILY HISTORY/SOCIAL HISTORY: reviewed   POH: see above    Review of Systems:  1) see HPI(S)/POH for ocular  ROS  2) denies recent/progressive HA's  3) general health fairly stable, feeling fine    SLE :       L/L/L:  OD: nl //OS: nl       CONJ:  OD: clear  //OS: clear          CORNEA:  OD: clear //OS: clear       AC:  OD: angle narrow, Gr I/II  // OS: angle narrow, Gr I/II       IRIS:  OD: clear  //OS: clear       LENS:  OD: 1-2+ NS  //OS: 1-2+ NS  --DILATED WITH PAREMYD**     FUNDUS: C/D  0.4 /0.4           vitr clear with nl ON/V/P OD  --MILD POST V HEME OS**  --SOME LAYERED INF          mac: OD: clear                   OS: clear          no break/RD OD  --FAIRLY LARGE FLAP TEAR 0130 VB OS**      EXT EXAM:  normal  NPs: Alert and oriented x 3, no apparent distress    A:  see diagnosis below; tech note reviewed and accepted     P:  see orders/disposition    COMMENT: pt concerned...reassured, brochure(s) given and questions answered       [place] retinal tear, left eye  (primary encounter diagnosis)  --mild V Heme     [facility] CONSULT   [facility]       02-Oct-2020 09:10

## 2020-10-02 NOTE — CONSULT NOTE ADULT - SUBJECTIVE AND OBJECTIVE BOX
HPI:  54 yo male with recurrent pancreatitis and was diagnosed w/ idiopathic pancreatitis (last discharged Sept 23) who presented to ED with worsening epigastric pain. Pt started having epigastric pain since ~9AM today. Pain is severe. No aggravating or alleviating factors. No fever. No vomiting. No chest pain. No SOB. No bm changes. Pt states since his discharge, his abdominal pain has improved until today. States he been on low fat diet.     Of note, pt was supposed to f/u GI for outpatient MRCP. Scheduled but not done. Off note pt had 3 episodes in the past month , US and CT scan were done and were confirming the diagnosis. Pt does not have any known causes in the blood work that would suggest the cause of pancreatitis including normal TG , Ca. US negative for stone.    Vital Signs Last 24 Hrs  T(C): 36.9 (01 Oct 2020 16:09), Max: 37.1 (01 Oct 2020 10:43)  T(F): 98.5 (01 Oct 2020 16:09), Max: 98.7 (01 Oct 2020 10:43)  HR: 83 (01 Oct 2020 16:09) (75 - 83)  BP: 117/74 (01 Oct 2020 16:09) (117/74 - 142/76)  BP(mean): 90 (01 Oct 2020 16:09) (90 - 90)  RR: 20 (01 Oct 2020 16:09) (20 - 20)  SpO2: 99% (01 Oct 2020 16:09) (98% - 99%)    In ED, pt found to have lipase > 3000. Pt admitted for pancreatitis.          (01 Oct 2020 18:04)      PAST MEDICAL & SURGICAL HISTORY:  Pancreatitis        REVIEW OF SYSTEMS      General:	    Skin/Breast:  	  Ophthalmologic:  	  ENMT:	    Respiratory and Thorax:  	  Cardiovascular:	    Gastrointestinal:	    Genitourinary:	         MEDICATIONS  (STANDING):  enoxaparin Injectable 40 milliGRAM(s) SubCutaneous at bedtime  folic acid 1 milliGRAM(s) Oral daily  influenza   Vaccine 0.5 milliLiter(s) IntraMuscular once  lactated ringers. 1000 milliLiter(s) (150 mL/Hr) IV Continuous <Continuous>  multivitamin 1 Tablet(s) Oral daily  polyethylene glycol 3350 17 Gram(s) Oral at bedtime  senna 2 Tablet(s) Oral at bedtime  thiamine 100 milliGRAM(s) Oral daily    MEDICATIONS  (PRN):  morphine  - Injectable 2 milliGRAM(s) IV Push every 4 hours PRN Severe Pain (7 - 10)      Allergies    No Known Allergies    Intolerances        SOCIAL HISTORY:    FAMILY HISTORY:      Vital Signs Last 24 Hrs  T(C): 36.1 (02 Oct 2020 06:35), Max: 37.1 (01 Oct 2020 10:43)  T(F): 97 (02 Oct 2020 06:35), Max: 98.7 (01 Oct 2020 10:43)  HR: 77 (02 Oct 2020 06:35) (64 - 83)  BP: 122/67 (02 Oct 2020 06:35) (105/55 - 142/76)  BP(mean): 90 (01 Oct 2020 16:09) (90 - 90)  RR: 18 (02 Oct 2020 06:35) (18 - 20)  SpO2: 98% (02 Oct 2020 06:35) (98% - 99%)    PHYSICAL EXAM:      Constitutional:    Eyes:    ENMT:      Cardiovascular:    Gastrointestinal: Epigastric tenderness            LABS:                        13.6   7.64  )-----------( 293      ( 02 Oct 2020 06:00 )             41.9       Hemoglobin: 13.6 g/dL (10-02-20 @ 06:00)  Hemoglobin: 13.8 g/dL (10-01-20 @ 12:24)      10-02    141  |  104  |  10  ----------------------------<  89  4.1   |  28  |  0.9    Ca    8.8      02 Oct 2020 06:00  Phos  3.5     10-02  Mg     2.2     10-02    TPro  5.8<L>  /  Alb  3.5  /  TBili  0.5  /  DBili  x   /  AST  14  /  ALT  13  /  AlkPhos  64  10-02    PT/INR - ( 02 Oct 2020 06:00 )   PT: 12.90 sec;   INR: 1.12 ratio         PTT - ( 02 Oct 2020 06:00 )  PTT:32.4 sec      RADIOLOGY & ADDITIONAL STUDIES: HPI:  54 yo male with recurrent pancreatitis and was diagnosed w/ idiopathic pancreatitis (last discharged Sept 23) who presented to ED with worsening epigastric pain.     Pt was in his usual state of good health until approximately 1.5 months ago when he developed an episode of pancreatitis. Since then he's had 2 episodes requiring hospitalization. During previous hospitalization he was found to have normal TG , Ca. US negative for stone, had normal CBD and no LFT abnormalities to suggest biliary cause. Pt was last discharged 4 days ago, reports he has only been eating yogurt and low fat foods since then.     Pt again started having severe epigastric pain  at 9 AM today. Pain was acute, epigastric, radiating to the back, same as his previous pancreatitis pains.     Of note, patient reports his father had pancreatitis, but he thinks it was related to alcohol. He believes his father had a surgery for his pancreas in the past. Does not think his father had cancer. Pt denies any EtOH use, no unintentional weight loss, no herbals/supplements.       Vital Signs Last 24 Hrs  T(C): 36.9 (01 Oct 2020 16:09), Max: 37.1 (01 Oct 2020 10:43)  T(F): 98.5 (01 Oct 2020 16:09), Max: 98.7 (01 Oct 2020 10:43)  HR: 83 (01 Oct 2020 16:09) (75 - 83)  BP: 117/74 (01 Oct 2020 16:09) (117/74 - 142/76)  BP(mean): 90 (01 Oct 2020 16:09) (90 - 90)  RR: 20 (01 Oct 2020 16:09) (20 - 20)  SpO2: 99% (01 Oct 2020 16:09) (98% - 99%)    In ED, pt found to have lipase > 3000. Pt admitted for pancreatitis.          (01 Oct 2020 18:04)      PAST MEDICAL & SURGICAL HISTORY:  Pancreatitis        REVIEW OF SYSTEMS      General:	    Skin/Breast:  	  Ophthalmologic:  	  ENMT:	    Respiratory and Thorax:  	  Cardiovascular:	    Gastrointestinal:	    Genitourinary:	         MEDICATIONS  (STANDING):  enoxaparin Injectable 40 milliGRAM(s) SubCutaneous at bedtime  folic acid 1 milliGRAM(s) Oral daily  influenza   Vaccine 0.5 milliLiter(s) IntraMuscular once  lactated ringers. 1000 milliLiter(s) (150 mL/Hr) IV Continuous <Continuous>  multivitamin 1 Tablet(s) Oral daily  polyethylene glycol 3350 17 Gram(s) Oral at bedtime  senna 2 Tablet(s) Oral at bedtime  thiamine 100 milliGRAM(s) Oral daily    MEDICATIONS  (PRN):  morphine  - Injectable 2 milliGRAM(s) IV Push every 4 hours PRN Severe Pain (7 - 10)      Allergies    No Known Allergies    Intolerances        SOCIAL HISTORY:    FAMILY HISTORY:      Vital Signs Last 24 Hrs  T(C): 36.1 (02 Oct 2020 06:35), Max: 37.1 (01 Oct 2020 10:43)  T(F): 97 (02 Oct 2020 06:35), Max: 98.7 (01 Oct 2020 10:43)  HR: 77 (02 Oct 2020 06:35) (64 - 83)  BP: 122/67 (02 Oct 2020 06:35) (105/55 - 142/76)  BP(mean): 90 (01 Oct 2020 16:09) (90 - 90)  RR: 18 (02 Oct 2020 06:35) (18 - 20)  SpO2: 98% (02 Oct 2020 06:35) (98% - 99%)    PHYSICAL EXAM:      Constitutional:    Eyes:    ENMT:      Cardiovascular:    Gastrointestinal: Epigastric tenderness            LABS:                        13.6   7.64  )-----------( 293      ( 02 Oct 2020 06:00 )             41.9       Hemoglobin: 13.6 g/dL (10-02-20 @ 06:00)  Hemoglobin: 13.8 g/dL (10-01-20 @ 12:24)      10-02    141  |  104  |  10  ----------------------------<  89  4.1   |  28  |  0.9    Ca    8.8      02 Oct 2020 06:00  Phos  3.5     10-02  Mg     2.2     10-02    TPro  5.8<L>  /  Alb  3.5  /  TBili  0.5  /  DBili  x   /  AST  14  /  ALT  13  /  AlkPhos  64  10-02    PT/INR - ( 02 Oct 2020 06:00 )   PT: 12.90 sec;   INR: 1.12 ratio         PTT - ( 02 Oct 2020 06:00 )  PTT:32.4 sec      RADIOLOGY & ADDITIONAL STUDIES: HPI:  56 yo male with recurrent pancreatitis and was diagnosed w/ idiopathic pancreatitis (last discharged Sept 23) who presented to ED with worsening epigastric pain.     Pt was in his usual state of good health until approximately 1.5 months ago when he developed an episode of pancreatitis. Since then he's had 2 episodes requiring hospitalization. During previous hospitalization he was found to have normal TG , Ca. US negative for stone, had normal CBD and no LFT abnormalities to suggest biliary cause. Pt was last discharged 4 days ago, reports he has only been eating yogurt and low fat foods since then.     Pt again started having severe epigastric pain  at 9 AM today. Pain was acute, epigastric, radiating to the back, same as his previous pancreatitis pains.     Of note, patient reports his father had pancreatitis, but he thinks it was related to alcohol. He believes his father had a surgery for his pancreas in the past. Does not think his father had cancer. Pt denies any EtOH use, no unintentional weight loss, no herbals/supplements.       Vital Signs Last 24 Hrs  T(C): 36.9 (01 Oct 2020 16:09), Max: 37.1 (01 Oct 2020 10:43)  T(F): 98.5 (01 Oct 2020 16:09), Max: 98.7 (01 Oct 2020 10:43)  HR: 83 (01 Oct 2020 16:09) (75 - 83)  BP: 117/74 (01 Oct 2020 16:09) (117/74 - 142/76)  BP(mean): 90 (01 Oct 2020 16:09) (90 - 90)  RR: 20 (01 Oct 2020 16:09) (20 - 20)  SpO2: 99% (01 Oct 2020 16:09) (98% - 99%)       (01 Oct 2020 18:04)      PAST MEDICAL & SURGICAL HISTORY:  Pancreatitis    SH  - denies EtOH, tobacco, illicit habits    FH  - ?pancreatic disease        REVIEW OF SYSTEMS      All other ROS negative except as per HPI         MEDICATIONS  (STANDING):  enoxaparin Injectable 40 milliGRAM(s) SubCutaneous at bedtime  folic acid 1 milliGRAM(s) Oral daily  influenza   Vaccine 0.5 milliLiter(s) IntraMuscular once  lactated ringers. 1000 milliLiter(s) (150 mL/Hr) IV Continuous <Continuous>  multivitamin 1 Tablet(s) Oral daily  polyethylene glycol 3350 17 Gram(s) Oral at bedtime  senna 2 Tablet(s) Oral at bedtime  thiamine 100 milliGRAM(s) Oral daily    MEDICATIONS  (PRN):  morphine  - Injectable 2 milliGRAM(s) IV Push every 4 hours PRN Severe Pain (7 - 10)      Allergies    No Known Allergies          Vital Signs Last 24 Hrs  T(C): 36.1 (02 Oct 2020 06:35), Max: 37.1 (01 Oct 2020 10:43)  T(F): 97 (02 Oct 2020 06:35), Max: 98.7 (01 Oct 2020 10:43)  HR: 77 (02 Oct 2020 06:35) (64 - 83)  BP: 122/67 (02 Oct 2020 06:35) (105/55 - 142/76)  BP(mean): 90 (01 Oct 2020 16:09) (90 - 90)  RR: 18 (02 Oct 2020 06:35) (18 - 20)  SpO2: 98% (02 Oct 2020 06:35) (98% - 99%)    PHYSICAL EXAM:      Gen: NAD  CV: RRR  Pulm: CTAB  Abd: soft, slight TTP in epigastrium  Ext: no edema in LE b/l  Neuro: nonfocal  Skin: no rashes            LABS:                        13.6   7.64  )-----------( 293      ( 02 Oct 2020 06:00 )             41.9       Hemoglobin: 13.6 g/dL (10-02-20 @ 06:00)  Hemoglobin: 13.8 g/dL (10-01-20 @ 12:24)      10-02    141  |  104  |  10  ----------------------------<  89  4.1   |  28  |  0.9    Ca    8.8      02 Oct 2020 06:00  Phos  3.5     10-02  Mg     2.2     10-02    TPro  5.8<L>  /  Alb  3.5  /  TBili  0.5  /  DBili  x   /  AST  14  /  ALT  13  /  AlkPhos  64  10-02    PT/INR - ( 02 Oct 2020 06:00 )   PT: 12.90 sec;   INR: 1.12 ratio         PTT - ( 02 Oct 2020 06:00 )  PTT:32.4 sec      RADIOLOGY & ADDITIONAL STUDIES:  CT   focal dilatation to 7 mm of the PD in the pancreas head; 2 cm cystic structure in the uncinate process

## 2020-10-02 NOTE — CONSULT NOTE ADULT - ASSESSMENT
IMP:  - recurrent idiopathic pancreatitis    PLAN:  - spoke with GI - ok to allow po no-fat clear liquids and observe. If tolerated they plan to advance diet. IMP:  - recurrent idiopathic pancreatitis    PLAN:  - spoke with GI - ok to allow po no-fat clear liquids and observe. If tolerated they plan to advance diet.  - if po clears started, include Ensure Clear at each meal, and Prosource Gelateine protein jello 2/d  - obtain triglyceride, phos, 25oh D level in am  - weigh pt - unlikely that he weighs exactly the same all the time  - if can not tolerate po diet, will start PPN tomorrow  - plan EUS on [place]

## 2020-10-02 NOTE — PROGRESS NOTE ADULT - ASSESSMENT
55 years old male with history of HLD, former tobacco use, quit within the past year, who presented to ED with worsening epigastric pain. Admitted for pancreatitis.    # Acute on chronic idiopathic pancreatitis   - Complicated with cyst formation in the uncinate process  - Acute onset abdominal pain, lipase >3000 trending down today 256  - Unclear etiology, no stones on abd U/S, no ETOH hx, no new meds, TG and Ca wnl  - US abdomen  - Will check for IgG4 for autoimmune pancreatitis  - MRCP   - c/w fluid hydration w/ LR @ 150 for now   - Diet NPO for now-->advance within 24h as tolerated  - pain has been controlled with IV morphine 2mg q4hrs for severe pain  -GI on board: Target Hct / BUN to make sure they are trending down/ Needs MRCP , will decide about EUS depending on MRCP results      # Obesity  - diet counselling  - lipid profile wnl, TG 80 on last admission  - repeat lipid panel   - HBA1C: 5.9%    DVT ppx ; Lovenox  GI ppx : not needed  Ambulate as tolerated   Diet : NPO for now   Code Full       55 years old male with history of HLD, former tobacco use, quit within the past year, who presented to ED with worsening epigastric pain. Admitted for pancreatitis.    # Acute on chronic idiopathic pancreatitis   - Complicated with cyst formation in the uncinate process  - Acute onset abdominal pain, lipase >3000 trending down today 256  - Unclear etiology, no stones on abd U/S, no ETOH hx, no new meds, TG and Ca wnl  - US abdomen  - Will check for IgG4 for autoimmune pancreatitis  - MRCP   - c/w fluid hydration w/ LR @ 150 for now   - Diet NPO for now-->advance within 24h as tolerated  - pain has been controlled with IV morphine 2mg q4hrs for severe pain  -GI on board: Target Hct / BUN to make sure they are trending down/ Needs MRCP , will decide about EUS depending on MRCP results      # Obesity  - diet counselling  - repeat lipid panel : no hypertriglyceridemia  - HBA1C: 5.9%    DVT ppx ; Lovenox  GI ppx : not needed  Ambulate as tolerated   Diet : NPO for now   Code Full

## 2020-10-02 NOTE — CONSULT NOTE ADULT - ATTENDING COMMENTS
Pt with recurrent pancreatitis of unknown etiology; either hereditary vs. autoimmune vs. small obstructing lesion (lori given focal dilatation of PD in the head)    - genetic w/u as above, IgG4  - please obtain MRI pancreas protocol and MRCP  - will plan for inpatient EUS given patient has been back multiple times for recurrent pancreatitis  - aggressive fluid hydration as above, pain control prn

## 2020-10-02 NOTE — CONSULT NOTE ADULT - ASSESSMENT
56 yo male with recurrent pancreatitis and was diagnosed w/ idiopathic pancreatitis (last discharged Sept 23) who presented to ED with worsening epigastric pain. Pt started having epigastric pain since ~9AM today. Pain is severe. No aggravating or alleviating factors. No fever. No vomiting. No chest pain. No SOB. No bm changes. Pt states since his discharge, his abdominal pain has improved until today. States he been on low fat diet.     Of note, pt was supposed to f/u GI for outpatient MRCP. Scheduled but not done. Off note pt had 3 episodes in the past month , US and CT scan were done and were confirming the diagnosis. Pt does not have any known causes in the blood work that would suggest the cause of pancreatitis including normal TG , Ca. US negative for stone.      #) Mild to Moderate severe acute pancreatitis / Recurrent pancreatitis   - Keep Pt NPO and advance diet within 48 hrs ( better out come)  -  IV fluids at 250 - 500 ml / per hr if cardiovascular status allows for first 48 hrs ( better out come) , can bolus to match fluid lag  - Pt has no inciting factors for these episodes  - If not worked up , please obtain IGG4 levels  - Please order Finger sticks standing, pt predisposed to develop DM  - Target Hct / BUN to make sure they are trending down  - Needs MRCP , will decide about EUS depending on MRCP results  - Provide DVT PPX , pro inflammatory condition  - Please Call MICU if signs of hemodynamic Instability , concern for necrotizing pancreatitis   - Will follow 56 yo male with recurrent pancreatitis of unknown etiology.       #) Recurrent pancreatitis of unknown etiology - no e/o cholelithiasis, no hx of EtOH; normal TGs and calcium. Suspect either genetic etiology,   - Keep Pt NPO and advance diet within 48 hrs ( better out come)  -  IV fluids at 250 - 500 ml / per hr if cardiovascular status allows for first 48 hrs ( better out come) , can bolus to match fluid lag  - Pt has no inciting factors for these episodes  - If not worked up , please obtain IGG4 levels  - Please order Finger sticks standing, pt predisposed to develop DM  - Target Hct / BUN to make sure they are trending down  - Needs MRCP , will decide about EUS depending on MRCP results  - Provide DVT PPX , pro inflammatory condition  - Please Call MICU if signs of hemodynamic Instability , concern for necrotizing pancreatitis   - Will follow 56 yo male with recurrent pancreatitis of unknown etiology.       #) Recurrent pancreatitis of unknown etiology - no e/o cholelithiasis, no hx of EtOH; normal TGs and calcium. Suspect either genetic etiology vs. small obstructing lesion vs. autoimmune  -  IV fluids at 250 cc/hr  - Target Hct / BUN to make sure they are trending down  - Obtain IGG4 levels, Ca 19-9  - Assess for genetic mutations including PRSS, SPINK, CFTR  - Please obtain MRCP and MRI with pancreatic protocol  - Provide DVT PPX , pro inflammatory condition  - Please Call MICU if signs of hemodynamic Instability , concern for necrotizing pancreatitis   - Given multiple recent episodes of pancreatitis, will pursue an inpatient EUS to assess for lesion in the pancreas

## 2020-10-02 NOTE — PROGRESS NOTE ADULT - SUBJECTIVE AND OBJECTIVE BOX
24H events:    Patient is a 55y old Male who presents with a chief complaint of Epigastric pain (02 Oct 2020 09:09)  Primary diagnosis of acute on top of chronic pancreatitis  Today is hospital day 1d. This morning patient was seen and examined at bedside, resting comfortably in bed.   Patient had no complains, pain is better controlled with the pain medication. No nausea or vomiting.    PAST MEDICAL & SURGICAL HISTORY  Pancreatitis      SOCIAL HISTORY:  Social History:  non- alcoholic  quit smoking a year ago, before 30 pack year h/o smoking (01 Oct 2020 18:04)      ALLERGIES:  No Known Allergies    MEDICATIONS:  STANDING MEDICATIONS  enoxaparin Injectable 40 milliGRAM(s) SubCutaneous at bedtime  folic acid 1 milliGRAM(s) Oral daily  influenza   Vaccine 0.5 milliLiter(s) IntraMuscular once  lactated ringers. 1000 milliLiter(s) IV Continuous <Continuous>  multivitamin 1 Tablet(s) Oral daily  polyethylene glycol 3350 17 Gram(s) Oral at bedtime  senna 2 Tablet(s) Oral at bedtime  thiamine 100 milliGRAM(s) Oral daily    PRN MEDICATIONS  morphine  - Injectable 2 milliGRAM(s) IV Push every 4 hours PRN    VITALS:   T(F): 97  HR: 77  BP: 122/67  RR: 18  SpO2: 98%    PHYSICAL EXAM:  GENERAL: NAD, well-groomed, well-developed  HEAD:  Atraumatic, Normocephalic  EYES: EOMI  NECK: Supple  NERVOUS SYSTEM:  Alert & Oriented X3, non focal   CHEST/LUNG: Clear to auscultation bilaterally; No rales, rhonchi, wheezing, or rubs  HEART: Regular rate and rhythm; No murmurs, rubs, or gallops  ABDOMEN: Soft, Nontender, Nondistended; Bowel sounds present  EXTREMITIES:  2+ Peripheral Pulses, No clubbing, cyanosis, or edema  LYMPH: No lymphadenopathy noted  SKIN: No rashes or lesions  LABS:                        13.6   7.64  )-----------( 293      ( 02 Oct 2020 06:00 )             41.9     10-02    141  |  104  |  10  ----------------------------<  89  4.1   |  28  |  0.9    Ca    8.8      02 Oct 2020 06:00  Phos  3.5     10-02  Mg     2.2     10-02    TPro  5.8<L>  /  Alb  3.5  /  TBili  0.5  /  DBili  x   /  AST  14  /  ALT  13  /  AlkPhos  64  10-02    PT/INR - ( 02 Oct 2020 06:00 )   PT: 12.90 sec;   INR: 1.12 ratio         PTT - ( 02 Oct 2020 06:00 )  PTT:32.4 sec      Lactate, Blood: 0.6 mmol/L <L> (10-02-20 @ 06:00)    Lipase: 3000-->256      RADIOLOGY:    US Abdomen Limited (10.01.20 @ 22:14) >  IMPRESSION:  No sonographic evidence of cholecystitis.  Pancreas obscured by overlying bowel gas.

## 2020-10-02 NOTE — PROGRESS NOTE ADULT - ASSESSMENT
55 years old male with history of HLD, former tobacco use, quit within the past year, who presented to ED with worsening epigastric pain. Admitted for pancreatitis.    Acute on Chronic idiopathic pancreatitis   - Complicated with cyst formation in the uncinate process  - Acute onset abdominal pain, lipase >3000 trending down today 256  - etiology unknown no evidence of obstruction on U/S, no ETOH hx, no new meds, TG, Ca are normal and Hgb a1c 5.9  - Will check for IgG4 for autoimmune pancreatitis  - MRCP ordered  - IVF hydration   - bowel rest  - pain has been controlled with IV morphine 2mg q4hrs for severe pain  - GI on board: Target Hct / BUN to make sure they are trending down/ Needs MRCP , will decide about EUS depending on MRCP results    Obesity  - diet counselling  - patient had been on diet since last hospital stay  - weight reduction needed      DVT ppx ; Lovenox  GI ppx : not needed  Ambulate as tolerated   Diet : NPO for now   Code Full

## 2020-10-02 NOTE — CONSULT NOTE ADULT - SUBJECTIVE AND OBJECTIVE BOX
54 yo male with recurrent pancreatitis and was diagnosed w/ idiopathic pancreatitis (last discharged Sept 23) who presented to ED with worsening epigastric pain.   Pt was in his usual state of good health until approximately 1.5 months ago when he developed an episode of pancreatitis. Since then he's had 2 episodes requiring hospitalization. During previous hospitalization he was found to have normal TG , Ca. US negative for stone, had normal CBD and no LFT abnormalities to suggest biliary cause. Pt was last discharged 4 days ago, reports he has only been eating yogurt and low fat foods since then.   Pt again started having severe epigastric pain  at 9 AM 10/1. Pain was acute, epigastric, radiating to the back, same as his previous pancreatitis pains.     Of note, patient reports his father had pancreatitis, but he thinks it was related to alcohol. He believes his father had a surgery for his pancreas in the past. Does not think his father had cancer. Pt denies any EtOH use, no unintentional weight loss, no herbals/supplements.     Vital Signs Last 24 Hrs  T(C): 36.5 (02 Oct 2020 14:04), Max: 36.9 (01 Oct 2020 16:09)  T(F): 97.7 (02 Oct 2020 14:04), Max: 98.5 (01 Oct 2020 16:09)  HR: 78 (02 Oct 2020 14:04) (64 - 83)  BP: 109/63 (02 Oct 2020 14:04) (105/55 - 122/73)  BP(mean): 90 (01 Oct 2020 16:09) (90 - 90)  RR: 18 (02 Oct 2020 14:04) (18 - 20)  SpO2: 98% (02 Oct 2020 06:35) (98% - 99%)  Drug Dosing Weight  Weight (kg): 88.5 (23 Sep 2020 14:17)  no height measured or recorded      MEDICATIONS  (STANDING):  enoxaparin Injectable 40 milliGRAM(s) SubCutaneous at bedtime  folic acid 1 milliGRAM(s) Oral daily  influenza   Vaccine 0.5 milliLiter(s) IntraMuscular once  lactated ringers. 1000 milliLiter(s) (150 mL/Hr) IV Continuous <Continuous>  multivitamin 1 Tablet(s) Oral daily  polyethylene glycol 3350 17 Gram(s) Oral at bedtime  senna 2 Tablet(s) Oral at bedtime  thiamine 100 milliGRAM(s) Oral daily                        13.6   7.64  )-----------( 293      ( 02 Oct 2020 06:00 )             41.9   10-02    141  |  104  |  10  ----------------------------<  89  4.1   |  28  |  0.9    Ca    8.8      02 Oct 2020 06:00  Phos  3.5     10-02  Mg     2.2     10-02    TPro  5.8<L>  /  Alb  3.5  /  TBili  0.5  /  DBili  x   /  AST  14  /  ALT  13  /  AlkPhos  64  10-02  triglyceride level 130 on 10/1,  70 on 9/21  < from: US Abdomen Limited (10.01.20 @ 22:14) >  IMPRESSION:  No sonographic evidence of cholecystitis.  Pancreas obscured by overlying bowel gas.    < end of copied text >    < from: CT Abdomen and Pelvis w/ IV Cont (09.20.20 @ 21:49) >  Acute pancreatitis. 2.2 x 1.5 cmcystic focus in the uncinate process favored to reflect dilated side branches rather than acute pancreatic collection. Nonspecific focal dilatation of the main pancreatic duct to 7 mm in the pancreatic head without upstream atrophy. Suggest follow-upshort interval pancreatic MRI once acute pathology resolves.    1.2 cm indeterminate left adrenal gland nodule can be evaluated on the aforementioned suggested pancreatic MRI    < end of copied text >   56 yo male with recurrent pancreatitis and was diagnosed w/ idiopathic pancreatitis (last discharged Sept 23) who presented to ED with worsening epigastric pain.   Pt was in his usual state of good health until approximately 1.5 months ago when he developed an episode of pancreatitis. Since then he's had 2 episodes requiring hospitalization. During previous hospitalization he was found to have normal TG , Ca. US negative for stone, had normal CBD and no LFT abnormalities to suggest biliary cause. Pt was last discharged 4 days ago, reports he has only been eating yogurt and low fat foods since then.   Pt again started having severe epigastric pain  at 9 AM 10/1. Pain was acute, epigastric, radiating to the back, same as his previous pancreatitis pains.     Of note, patient reports his father had pancreatitis, but he thinks it was related to alcohol. He believes his father had a surgery for his pancreas in the past. Does not think his father had cancer. Pt denies any EtOH use, no unintentional weight loss, no herbals/supplements.   per previous admission chart, Tobacco: 30 pack-year Hx. quit 1 year ago  Alcohol: heavy past alcohol use, quit 30 years ago    VS Last 24 Hrs  T(C): 36.5 (02 Oct 2020 14:04), Max: 36.9 (01 Oct 2020 16:09)  T(F): 97.7 (02 Oct 2020 14:04), Max: 98.5 (01 Oct 2020 16:09)  HR: 78 (02 Oct 2020 14:04) (64 - 83)  BP: 109/63 (02 Oct 2020 14:04) (105/55 - 122/73)  BP(mean): 90 (01 Oct 2020 16:09) (90 - 90)  RR: 18 (02 Oct 2020 14:04) (18 - 20)  SpO2: 98% (02 Oct 2020 06:35) (98% - 99%)  Drug Dosing Weight  Weight (kg): 88.5 (23 Sep 2020 14:17) - suspect pt asked and not weighed, because this is the exact # given on previous admissions  no height measured or recorded      MEDICATIONS  (STANDING):  enoxaparin Injectable 40 milliGRAM(s) SubCutaneous at bedtime  folic acid 1 milliGRAM(s) Oral daily  influenza   Vaccine 0.5 milliLiter(s) IntraMuscular once  lactated ringers. 1000 milliLiter(s) (150 mL/Hr) IV Continuous <Continuous>  multivitamin 1 Tablet(s) Oral daily  polyethylene glycol 3350 17 Gram(s) Oral at bedtime  senna 2 Tablet(s) Oral at bedtime  thiamine 100 milliGRAM(s) Oral daily                        13.6   7.64  )-----------( 293      ( 02 Oct 2020 06:00 )             41.9   10-02    141  |  104  |  10  ----------------------------<  89  4.1   |  28  |  0.9    Ca    8.8      02 Oct 2020 06:00  Phos  3.5     10-02  Mg     2.2     10-02    TPro  5.8<L>  /  Alb  3.5  /  TBili  0.5  /  DBili  x   /  AST  14  /  ALT  13  /  AlkPhos  64  10-02  triglyceride level 130 on 10/1,  70 on 9/21  < from: US Abdomen Limited (10.01.20 @ 22:14) >  IMPRESSION:  No sonographic evidence of cholecystitis.  Pancreas obscured by overlying bowel gas.    < end of copied text >    < from: CT Abdomen and Pelvis w/ IV Cont (09.20.20 @ 21:49) >  Acute pancreatitis. 2.2 x 1.5 cmcystic focus in the uncinate process favored to reflect dilated side branches rather than acute pancreatic collection. Nonspecific focal dilatation of the main pancreatic duct to 7 mm in the pancreatic head without upstream atrophy. Suggest follow-upshort interval pancreatic MRI once acute pathology resolves.    1.2 cm indeterminate left adrenal gland nodule can be evaluated on the aforementioned suggested pancreatic MRI    < end of copied text >     56 yo male with recurrent pancreatitis and was diagnosed w/ idiopathic pancreatitis (last discharged Sept 23) who presented to ED with worsening epigastric pain.   Pt was in his usual state of good health until approximately 1.5 months ago when he developed an episode of pancreatitis. Since then he's had 2 episodes requiring hospitalization. During previous hospitalization he was found to have normal TG , Ca. US negative for stone, had normal CBD and no LFT abnormalities to suggest biliary cause. Pt was last discharged 4 days ago, reports he has only been eating yogurt and low fat foods since then.   Pt again started having severe epigastric pain  at 9 AM 10/1. Pain was acute, epigastric, radiating to the back, same as his previous pancreatitis pains.     Of note, patient reports his father had pancreatitis, but he thinks it was related to alcohol. He believes his father had a surgery for his pancreas in the past. Does not think his father had cancer. Pt denies any EtOH use, no unintentional weight loss, no herbals/supplements.   per previous admission chart, Tobacco: 30 pack-year Hx. quit 1 year ago  Alcohol: heavy past alcohol use, quit 30 years ago    VS Last 24 Hrs  T(C): 36.5 (02 Oct 2020 14:04), Max: 36.9 (01 Oct 2020 16:09)  T(F): 97.7 (02 Oct 2020 14:04), Max: 98.5 (01 Oct 2020 16:09)  HR: 78 (02 Oct 2020 14:04) (64 - 83)  BP: 109/63 (02 Oct 2020 14:04) (105/55 - 122/73)  BP(mean): 90 (01 Oct 2020 16:09) (90 - 90)  RR: 18 (02 Oct 2020 14:04) (18 - 20)  SpO2: 98% (02 Oct 2020 06:35) (98% - 99%)  Drug Dosing Weight  Weight (kg): 88.5 (23 Sep 2020 14:17) - suspect pt asked and not weighed, because this is the exact # given on previous admissions  no height measured or recorded  A&Ox3, skin turgor good, sclerae anicteric  abd seems distnded, but pt says it's his norm  + epigastric tenderness, pt says pain in epigastric and "down the middle" of his abd, does not radiate to the back  no nausea or V today  no LE edema. periph iv only    MEDICATIONS  (STANDING):  enoxaparin Injectable 40 milliGRAM(s) SubCutaneous at bedtime  folic acid 1 milliGRAM(s) Oral daily  influenza   Vaccine 0.5 milliLiter(s) IntraMuscular once  lactated ringers. 1000 milliLiter(s) (150 mL/Hr) IV Continuous <Continuous>  multivitamin 1 Tablet(s) Oral daily  polyethylene glycol 3350 17 Gram(s) Oral at bedtime  senna 2 Tablet(s) Oral at bedtime  thiamine 100 milliGRAM(s) Oral daily                        13.6   7.64  )-----------( 293      ( 02 Oct 2020 06:00 )             41.9   10-02    141  |  104  |  10  ----------------------------<  89  4.1   |  28  |  0.9    Ca    8.8      02 Oct 2020 06:00  Phos  3.5     10-02  Mg     2.2     10-02    TPro  5.8<L>  /  Alb  3.5  /  TBili  0.5  /  DBili  x   /  AST  14  /  ALT  13  /  AlkPhos  64  10-02  triglyceride level 130 on 10/1,  70 on 9/21  < from: US Abdomen Limited (10.01.20 @ 22:14) >  IMPRESSION:  No sonographic evidence of cholecystitis.  Pancreas obscured by overlying bowel gas.    < end of copied text >    < from: CT Abdomen and Pelvis w/ IV Cont (09.20.20 @ 21:49) >  Acute pancreatitis. 2.2 x 1.5 cmcystic focus in the uncinate process favored to reflect dilated side branches rather than acute pancreatic collection. Nonspecific focal dilatation of the main pancreatic duct to 7 mm in the pancreatic head without upstream atrophy. Suggest follow-upshort interval pancreatic MRI once acute pathology resolves.    1.2 cm indeterminate left adrenal gland nodule can be evaluated on the aforementioned suggested pancreatic MRI    < end of copied text >

## 2020-10-03 LAB
24R-OH-CALCIDIOL SERPL-MCNC: 22 NG/ML — LOW (ref 30–80)
ALBUMIN SERPL ELPH-MCNC: 3.7 G/DL — SIGNIFICANT CHANGE UP (ref 3.5–5.2)
ALP SERPL-CCNC: 58 U/L — SIGNIFICANT CHANGE UP (ref 30–115)
ALT FLD-CCNC: 11 U/L — SIGNIFICANT CHANGE UP (ref 0–41)
ANION GAP SERPL CALC-SCNC: 13 MMOL/L — SIGNIFICANT CHANGE UP (ref 7–14)
AST SERPL-CCNC: 14 U/L — SIGNIFICANT CHANGE UP (ref 0–41)
BILIRUB SERPL-MCNC: 0.5 MG/DL — SIGNIFICANT CHANGE UP (ref 0.2–1.2)
BUN SERPL-MCNC: 10 MG/DL — SIGNIFICANT CHANGE UP (ref 10–20)
CALCIUM SERPL-MCNC: 9.1 MG/DL — SIGNIFICANT CHANGE UP (ref 8.5–10.1)
CHLORIDE SERPL-SCNC: 102 MMOL/L — SIGNIFICANT CHANGE UP (ref 98–110)
CO2 SERPL-SCNC: 26 MMOL/L — SIGNIFICANT CHANGE UP (ref 17–32)
CREAT SERPL-MCNC: 0.8 MG/DL — SIGNIFICANT CHANGE UP (ref 0.7–1.5)
GLUCOSE SERPL-MCNC: 74 MG/DL — SIGNIFICANT CHANGE UP (ref 70–99)
HCT VFR BLD CALC: 41.5 % — LOW (ref 42–52)
HGB BLD-MCNC: 13.7 G/DL — LOW (ref 14–18)
LIDOCAIN IGE QN: 55 U/L — SIGNIFICANT CHANGE UP (ref 7–60)
MAGNESIUM SERPL-MCNC: 2 MG/DL — SIGNIFICANT CHANGE UP (ref 1.8–2.4)
MCHC RBC-ENTMCNC: 29.8 PG — SIGNIFICANT CHANGE UP (ref 27–31)
MCHC RBC-ENTMCNC: 33 G/DL — SIGNIFICANT CHANGE UP (ref 32–37)
MCV RBC AUTO: 90.4 FL — SIGNIFICANT CHANGE UP (ref 80–94)
NRBC # BLD: 0 /100 WBCS — SIGNIFICANT CHANGE UP (ref 0–0)
PHOSPHATE SERPL-MCNC: 3.5 MG/DL — SIGNIFICANT CHANGE UP (ref 2.1–4.9)
PLATELET # BLD AUTO: 282 K/UL — SIGNIFICANT CHANGE UP (ref 130–400)
POTASSIUM SERPL-MCNC: 4.5 MMOL/L — SIGNIFICANT CHANGE UP (ref 3.5–5)
POTASSIUM SERPL-SCNC: 4.5 MMOL/L — SIGNIFICANT CHANGE UP (ref 3.5–5)
PROT SERPL-MCNC: 6.3 G/DL — SIGNIFICANT CHANGE UP (ref 6–8)
RBC # BLD: 4.59 M/UL — LOW (ref 4.7–6.1)
RBC # FLD: 12.5 % — SIGNIFICANT CHANGE UP (ref 11.5–14.5)
SODIUM SERPL-SCNC: 141 MMOL/L — SIGNIFICANT CHANGE UP (ref 135–146)
TRIGL SERPL-MCNC: 127 MG/DL — SIGNIFICANT CHANGE UP (ref 10–149)
WBC # BLD: 8.53 K/UL — SIGNIFICANT CHANGE UP (ref 4.8–10.8)
WBC # FLD AUTO: 8.53 K/UL — SIGNIFICANT CHANGE UP (ref 4.8–10.8)

## 2020-10-03 RX ADMIN — Medication 1 TABLET(S): at 12:06

## 2020-10-03 RX ADMIN — SODIUM CHLORIDE 150 MILLILITER(S): 9 INJECTION, SOLUTION INTRAVENOUS at 18:59

## 2020-10-03 RX ADMIN — Medication 1 MILLIGRAM(S): at 12:06

## 2020-10-03 RX ADMIN — ENOXAPARIN SODIUM 40 MILLIGRAM(S): 100 INJECTION SUBCUTANEOUS at 21:25

## 2020-10-03 RX ADMIN — Medication 100 MILLIGRAM(S): at 12:06

## 2020-10-03 NOTE — PROGRESS NOTE ADULT - SUBJECTIVE AND OBJECTIVE BOX
Hospital Day:  2d    Subjective:    Patient is a 55y old  Male who presents with a chief complaint of Epigastric pain (02 Oct 2020 15:11)      Admitted to medicine for a primary diagnosis of     Past Medical Hx:   Pancreatitis      Past Sx:    Allergies:  No Known Allergies    Current Meds:   Standng Meds:  enoxaparin Injectable 40 milliGRAM(s) SubCutaneous at bedtime  folic acid 1 milliGRAM(s) Oral daily  influenza   Vaccine 0.5 milliLiter(s) IntraMuscular once  lactated ringers. 1000 milliLiter(s) (150 mL/Hr) IV Continuous <Continuous>  multivitamin 1 Tablet(s) Oral daily  polyethylene glycol 3350 17 Gram(s) Oral at bedtime  senna 2 Tablet(s) Oral at bedtime  thiamine 100 milliGRAM(s) Oral daily    PRN Meds:  acetaminophen   Tablet .. 650 milliGRAM(s) Oral every 6 hours PRN Mild Pain (1 - 3)  morphine  - Injectable 2 milliGRAM(s) IV Push every 4 hours PRN Severe Pain (7 - 10)    HOME MEDICATIONS:      Vital Signs:   T(F): 97.2 (10-03-20 @ 04:30), Max: 97.7 (10-02-20 @ 14:04)  HR: 73 (10-03-20 @ 04:30) (71 - 78)  BP: 117/63 (10-03-20 @ 04:30) (101/71 - 117/63)  RR: 18 (10-03-20 @ 04:30) (18 - 18)  SpO2: --        Physical Exam:   GENERAL: NAD  HEENT: NCAT  CHEST/LUNG: CTAB  HEART: Regular rate and rhythm; s1 s2 appreciated, No murmurs, rubs, or gallops  ABDOMEN: Soft, Nontender, Nondistended; Bowel sounds present  EXTREMITIES: No LE edema b/l  SKIN: no rashes, no new lesions  NERVOUS SYSTEM:  Alert & Oriented X3  LINES/CATHETERS:        Labs:                         13.6   7.64  )-----------( 293      ( 02 Oct 2020 06:00 )             41.9       02 Oct 2020 06:00    141    |  104    |  10     ----------------------------<  89     4.1     |  28     |  0.9      Ca    8.8        02 Oct 2020 06:00  Phos  3.5       02 Oct 2020 06:00  Mg     2.2       02 Oct 2020 06:00    TPro  5.8    /  Alb  3.5    /  TBili  0.5    /  DBili  x      /  AST  14     /  ALT  13     /  AlkPhos  64     02 Oct 2020 06:00        Amylase --, Lipase 256, 10-02-20 @ 06:00  Amylase --, Lipase >3000, 10-01-20 @ 12:24                       Hospital Day:  2d    Subjective:    Patient is a 55y old  Male who presents with a chief complaint of Epigastric pain. No acute events overnight. This morning pt reports improvement in abd pain, states he did not need any pain meds. Denies any other physical complaints. would like to try a clear diet.       Admitted to medicine for a primary diagnosis of acute on chronic pancreatitis     Past Medical Hx:   Pancreatitis      Past Sx:    Allergies:  No Known Allergies    Current Meds:   Standng Meds:  enoxaparin Injectable 40 milliGRAM(s) SubCutaneous at bedtime  folic acid 1 milliGRAM(s) Oral daily  influenza   Vaccine 0.5 milliLiter(s) IntraMuscular once  lactated ringers. 1000 milliLiter(s) (150 mL/Hr) IV Continuous <Continuous>  multivitamin 1 Tablet(s) Oral daily  polyethylene glycol 3350 17 Gram(s) Oral at bedtime  senna 2 Tablet(s) Oral at bedtime  thiamine 100 milliGRAM(s) Oral daily    PRN Meds:  acetaminophen   Tablet .. 650 milliGRAM(s) Oral every 6 hours PRN Mild Pain (1 - 3)  morphine  - Injectable 2 milliGRAM(s) IV Push every 4 hours PRN Severe Pain (7 - 10)    HOME MEDICATIONS:      Vital Signs:   T(F): 97.2 (10-03-20 @ 04:30), Max: 97.7 (10-02-20 @ 14:04)  HR: 73 (10-03-20 @ 04:30) (71 - 78)  BP: 117/63 (10-03-20 @ 04:30) (101/71 - 117/63)  RR: 18 (10-03-20 @ 04:30) (18 - 18)  SpO2: --        Physical Exam:   GENERAL: NAD  HEENT: NCAT  CHEST/LUNG: CTAB  HEART: Regular rate and rhythm; s1 s2 appreciated, No murmurs, rubs, or gallops  ABDOMEN: Soft, tender to deep palpation, no rebound, Nondistended; Bowel sounds present  EXTREMITIES: No LE edema b/l  SKIN: no rashes, no new lesions  NERVOUS SYSTEM:  AAAOx3        Labs:                         13.6   7.64  )-----------( 293      ( 02 Oct 2020 06:00 )             41.9       02 Oct 2020 06:00    141    |  104    |  10     ----------------------------<  89     4.1     |  28     |  0.9      Ca    8.8        02 Oct 2020 06:00  Phos  3.5       02 Oct 2020 06:00  Mg     2.2       02 Oct 2020 06:00    TPro  5.8    /  Alb  3.5    /  TBili  0.5    /  DBili  x      /  AST  14     /  ALT  13     /  AlkPhos  64     02 Oct 2020 06:00        Amylase --, Lipase 256, 10-02-20 @ 06:00  Amylase --, Lipase >3000, 10-01-20 @ 12:24

## 2020-10-03 NOTE — PROGRESS NOTE ADULT - ASSESSMENT
55 years old male with history of HLD, former tobacco use, quit within the past year, who presented to ED with worsening epigastric pain. Admitted for pancreatitis.    Acute, recurrent pancreatitis   - lipase continues trending down   - IgG4 for autoimmune pancreatitis pending  - MRCP noted  - tolerating PO liquids - advance diet  - pain has been controlled with IV morphine 2mg q4hrs for severe pain  - GI and Nutrition notes appreciated  - supplements ordered    Obesity  - diet counselling  - patient had been on diet since last hospital stay  - weight reduction needed      DVT ppx ; Lovenox  GI ppx : not needed  Ambulate as tolerated   Diet : NPO for now   Code Full    Gave the name of Dr Crispin Barriga at [facility] for a second opinion

## 2020-10-03 NOTE — PROGRESS NOTE ADULT - SUBJECTIVE AND OBJECTIVE BOX
GALDINOJEFF  55y  Male  HPI:  54 yo male with recurrent pancreatitis and was diagnosed w/ idiopathic pancreatitis (last discharged Sept 23) who presented to ED with worsening epigastric pain. Pt started having epigastric pain since ~9AM today. Pain is severe. No aggravating or alleviating factors. No fever. No vomiting. No chest pain. No SOB. No bm changes. Pt states since his discharge, his abdominal pain has improved until today. States he been on low fat diet.     Of note, pt was supposed to f/u GI for outpatient MRCP. Scheduled but not done.    Vital Signs Last 24 Hrs  T(C): 36.9 (01 Oct 2020 16:09), Max: 37.1 (01 Oct 2020 10:43)  T(F): 98.5 (01 Oct 2020 16:09), Max: 98.7 (01 Oct 2020 10:43)  HR: 83 (01 Oct 2020 16:09) (75 - 83)  BP: 117/74 (01 Oct 2020 16:09) (117/74 - 142/76)  BP(mean): 90 (01 Oct 2020 16:09) (90 - 90)  RR: 20 (01 Oct 2020 16:09) (20 - 20)  SpO2: 99% (01 Oct 2020 16:09) (98% - 99%)    In ED, pt found to have lipase > 3000. Pt admitted for pancreatitis.          (01 Oct 2020 18:04)    MEDICATIONS  (STANDING):  enoxaparin Injectable 40 milliGRAM(s) SubCutaneous at bedtime  folic acid 1 milliGRAM(s) Oral daily  influenza   Vaccine 0.5 milliLiter(s) IntraMuscular once  lactated ringers. 1000 milliLiter(s) (150 mL/Hr) IV Continuous <Continuous>  multivitamin 1 Tablet(s) Oral daily  polyethylene glycol 3350 17 Gram(s) Oral at bedtime  senna 2 Tablet(s) Oral at bedtime  thiamine 100 milliGRAM(s) Oral daily    MEDICATIONS  (PRN):  acetaminophen   Tablet .. 650 milliGRAM(s) Oral every 6 hours PRN Mild Pain (1 - 3)  morphine  - Injectable 2 milliGRAM(s) IV Push every 4 hours PRN Severe Pain (7 - 10)    INTERVAL EVENTS: Patient seen today feeling better, tolerated liquids this am. MRCP completed    T(C): 36.2 (10-03-20 @ 04:30), Max: 36.5 (10-02-20 @ 14:04)  HR: 73 (10-03-20 @ 04:30) (71 - 78)  BP: 117/63 (10-03-20 @ 04:30) (101/71 - 117/63)  RR: 18 (10-03-20 @ 04:30) (18 - 18)  SpO2: --  Wt(kg): --Vital Signs Last 24 Hrs  T(C): 36.2 (03 Oct 2020 04:30), Max: 36.5 (02 Oct 2020 14:04)  T(F): 97.2 (03 Oct 2020 04:30), Max: 97.7 (02 Oct 2020 14:04)  HR: 73 (03 Oct 2020 04:30) (71 - 78)  BP: 117/63 (03 Oct 2020 04:30) (101/71 - 117/63)  BP(mean): --  RR: 18 (03 Oct 2020 04:30) (18 - 18)  SpO2: --    PHYSICAL EXAM:  GENERAL: NAD  NECK: Supple, No JVD  CHEST/LUNG: Clear  HEART: S1, S2, Regular rate and rhythm  ABDOMEN: Soft, Nondistended; Bowel sounds present  EXTREMITIES: No edema      LABS:  Labs:                        13.7   8.53  )-----------( 282      ( 03 Oct 2020 07:05 )             41.5             10-03    141  |  102  |  10  ----------------------------<  74  4.5   |  26  |  0.8    Ca    9.1      03 Oct 2020 07:05  Phos  3.5     10-03  Mg     2.0     10-03    TPro  6.3  /  Alb  3.7  /  TBili  0.5  /  DBili  x   /  AST  14  /  ALT  11  /  AlkPhos  58  10-03    LIVER FUNCTIONS - ( 03 Oct 2020 07:05 )  Alb: 3.7 g/dL / Pro: 6.3 g/dL / ALK PHOS: 58 U/L / ALT: 11 U/L / AST: 14 U/L / GGT: x                   PT/INR - ( 02 Oct 2020 06:00 )   PT: 12.90 sec;   INR: 1.12 ratio         PTT - ( 02 Oct 2020 06:00 )  PTT:32.4 sec      Phosphorus Level, Serum in AM (10.03.20 @ 07:05)   Phosphorus Level, Serum: 3.5 mg/dL Triglycerides, Serum in AM (10.03.20 @ 07:05)   Triglycerides, Serum: 127 mg/dL Lipase, Serum in AM (10.03.20 @ 07:05)     Lipase, Serum: 55 U/L (10.03.20 @ 07:05)   Lipase, Serum: 256 U/L (10.02.20 @ 06:00)  Lipase, Serum: >3000 U/L (10.01.20 @ 12:24)       RADIOLOGY & ADDITIONAL TESTS:  < from: MR Abdomen w/wo IV Cont (10.02.20 @ 22:52) >    INTERPRETATION:  MRCP/MRI abdomen with and without IV contrast    Indication: Abdominal pain. Pancreatitis.    Technique: MRCP/MRI abdomen with and without IV contrast performed. Multiplanar, multi sequential T1, T2-weighted images of the abdomen were obtained before and after the administration of 9 cc gadolinium-based intravenous contrast. 1 cc contrast discarded. MRCP images obtained. Diffusion weighted images obtained.    Comparison: CT abdomen and pelvis September 20, 2020    Findings:    Liver: Noncirrhotic. No significant hepatic steatosis or iron deposition. No focal hepatic mass    Gallbladder: Normal gallbladder.    Biliary system: No biliary distention, mass, filling defect, or stricture.    Pancreas:  Decreased peripancreatic inflammatory stranding and edema, without evidence of acute fluid collection. Homogeneous pancreatic parenchymal enhancement.    Pancreatic uncinate process 2.2 cm cystic lesion with communication to nondilated main pancreatic duct (series 8, image 20; series 4, image 26). No solid nodular enhancing component.    Additional abdominal organs: Left adrenal 1.1 cm nodule with dropout of signal on opposed phase T1-weighted images. Spleen, kidneys, and right adrenal gland are unremarkable.    Additional findings: No ascites or lymphadenopathy. Degenerative change, thoracolumbar spine.    Impression:    1. Findings consistent with acute interstitial edematous pancreatitis, improved since September 20, 2020. No evidence of acute fluid collection.    2. Pancreatic uncinate process 2.2 cm cystic lesion with communication to nondilated main pancreatic duct, most consistent with pancreatic sidebranch IPMN. Follow-up MRCP in 6-12months recommended.    3. Left adrenal 1.1 cm adenoma.        < end of copied text >

## 2020-10-03 NOTE — PROGRESS NOTE ADULT - ASSESSMENT
55 years old male with history of HLD, former tobacco use, quit within the past year, who presented to ED with worsening epigastric pain. Admitted for pancreatitis.    # Acute on chronic idiopathic pancreatitis   - Complicated with cyst formation in the uncinate process  - Acute onset abdominal pain, lipase >3000 trending down today 256  - Unclear etiology, no stones on abd U/S, no ETOH hx, no new meds, TG and Ca wnl  - US abdomen  - F/u IgG4 for autoimmune pancreatitis  - MRCP 10/2: Findings c/w acute interstitial edematous pancreatitis, improved since September 20, 2020. No evidence of acute fluid collection.Pancreatic uncinate process 2.2 cm cystic lesion with communication to nondilated main pancreatic duct, most consistent with pancreatic sidebranch IPMN  - c/w fluid hydration w/ LR @ 150 for now   - Diet NPO for now-->advance within 24h as tolerated  - pain has been controlled with IV morphine 2mg q4hrs for severe pain  -GI on board: Target Hct / BUN to make sure they are trending down/ Needs MRCP , will decide about EUS depending on MRCP results    # Obesity  - diet counselling  - repeat lipid panel : no hypertriglyceridemia  - HBA1C: 5.9%    DVT ppx ; Lovenox  GI ppx : not needed  Ambulate as tolerated   Diet : NPO for now   Code Full           55 years old male with history of HLD, former tobacco use, quit within the past year, who presented to ED with worsening epigastric pain. Admitted for pancreatitis.    # Acute on chronic idiopathic pancreatitis   - Complicated with cyst formation in the uncinate process  - Acute onset abdominal pain, lipase >3000 trending down today 256  - Unclear etiology, no stones on abd U/S, no ETOH hx, no new meds, TG and Ca wnl. BAL <10  - US abd: pancreas poorly visualized, no cholecystitis  - MRCP 10/2: Improving interstitial edematous pancreatitis. No acute fluid collection. Pancreatic uncinate process 2.2 cm cystic lesion w/ communication to nondilated main pancreatic duct, most consistent with pancreatic sidebranch intraductal papillary mucinous neoplasm (IPMN)  - c/w fluid hydration w/ LR @ 150 for now   - Diet advanced to clears   - C/w IV morphine 2mg q4hrs for severe pain  - GI on board: Target Hct / BUN to make sure they are trending down. Possible EUS depending on MRCP results  - F/u IgG4 for autoimmune pancreatitis    # Obesity  - diet counselling  - repeat lipid panel : no hypertriglyceridemia  - HBA1C: 5.9%    DVT ppx ; Lovenox  GI ppx : not needed  Ambulate as tolerated   Diet : clears. Advance as tolerated   Code Full  Dispo: possible EUS on Monday

## 2020-10-04 LAB
ANION GAP SERPL CALC-SCNC: 8 MMOL/L — SIGNIFICANT CHANGE UP (ref 7–14)
BUN SERPL-MCNC: 10 MG/DL — SIGNIFICANT CHANGE UP (ref 10–20)
CALCIUM SERPL-MCNC: 9.2 MG/DL — SIGNIFICANT CHANGE UP (ref 8.5–10.1)
CHLORIDE SERPL-SCNC: 104 MMOL/L — SIGNIFICANT CHANGE UP (ref 98–110)
CO2 SERPL-SCNC: 31 MMOL/L — SIGNIFICANT CHANGE UP (ref 17–32)
CREAT SERPL-MCNC: 0.8 MG/DL — SIGNIFICANT CHANGE UP (ref 0.7–1.5)
GLUCOSE SERPL-MCNC: 89 MG/DL — SIGNIFICANT CHANGE UP (ref 70–99)
HCT VFR BLD CALC: 41.4 % — LOW (ref 42–52)
HGB BLD-MCNC: 13.7 G/DL — LOW (ref 14–18)
MCHC RBC-ENTMCNC: 29.9 PG — SIGNIFICANT CHANGE UP (ref 27–31)
MCHC RBC-ENTMCNC: 33.1 G/DL — SIGNIFICANT CHANGE UP (ref 32–37)
MCV RBC AUTO: 90.4 FL — SIGNIFICANT CHANGE UP (ref 80–94)
NRBC # BLD: 0 /100 WBCS — SIGNIFICANT CHANGE UP (ref 0–0)
PLATELET # BLD AUTO: 289 K/UL — SIGNIFICANT CHANGE UP (ref 130–400)
POTASSIUM SERPL-MCNC: 5.2 MMOL/L — HIGH (ref 3.5–5)
POTASSIUM SERPL-SCNC: 5.2 MMOL/L — HIGH (ref 3.5–5)
RBC # BLD: 4.58 M/UL — LOW (ref 4.7–6.1)
RBC # FLD: 12.5 % — SIGNIFICANT CHANGE UP (ref 11.5–14.5)
SODIUM SERPL-SCNC: 143 MMOL/L — SIGNIFICANT CHANGE UP (ref 135–146)
WBC # BLD: 7.76 K/UL — SIGNIFICANT CHANGE UP (ref 4.8–10.8)
WBC # FLD AUTO: 7.76 K/UL — SIGNIFICANT CHANGE UP (ref 4.8–10.8)

## 2020-10-04 RX ORDER — SODIUM CHLORIDE 9 MG/ML
1000 INJECTION, SOLUTION INTRAVENOUS
Refills: 0 | Status: DISCONTINUED | OUTPATIENT
Start: 2020-10-04 | End: 2020-10-04

## 2020-10-04 RX ADMIN — Medication 1 TABLET(S): at 11:02

## 2020-10-04 RX ADMIN — Medication 100 MILLIGRAM(S): at 11:01

## 2020-10-04 RX ADMIN — Medication 1 MILLIGRAM(S): at 11:01

## 2020-10-04 NOTE — PROGRESS NOTE ADULT - ASSESSMENT
55 years old male with history of HLD, former tobacco use, quit within the past year, who presented to ED with worsening epigastric pain. Admitted for pancreatitis.    # Acute on chronic idiopathic pancreatitis   - Complicated with cyst formation in the uncinate process  - Acute onset abdominal pain, lipase >3000 trending 55  - Unclear etiology, no stones on abd U/S, no ETOH hx, no new meds, TG and Ca wnl. BAL <10  - US abd: pancreas poorly visualized, no cholecystitis  - MRCP 10/2: Improving interstitial edematous pancreatitis. No acute fluid collection. Pancreatic uncinate process 2.2 cm cystic lesion w/ communication to nondilated main pancreatic duct, most consistent with pancreatic sidebranch intraductal papillary mucinous neoplasm (IPMN)  - c/w fluid hydration w/ LR @ 150 for now stop if he tolerated low fat diet  -On clear fuid now will advance to Low fat diet  - GI on board: Target Hct / BUN to make sure they are trending down. EUS on monday-->NPO after midnight  - F/u IgG4 for autoimmune pancreatitis    # Obesity  - diet counselling  - repeat lipid panel : no hypertriglyceridemia  - HBA1C: 5.9%    DVT ppx ; Lovenox  GI ppx : not needed  Ambulate as tolerated   Diet : clears. Advance as tolerated   Code Full  Dispo: possible EUS on Monday             55 years old male with history of HLD, former tobacco use, quit within the past year, who presented to ED with worsening epigastric pain. Admitted for pancreatitis.    # Acute on chronic idiopathic pancreatitis   - Complicated with cyst formation in the uncinate process  - Acute onset abdominal pain, lipase >3000 trending 55  - Unclear etiology, no stones on abd U/S, no ETOH hx, no new meds, TG and Ca wnl. BAL <10  - US abd: pancreas poorly visualized, no cholecystitis  - MRCP 10/2: Improving interstitial edematous pancreatitis. No acute fluid collection. Pancreatic uncinate process 2.2 cm cystic lesion w/ communication to nondilated main pancreatic duct, most consistent with pancreatic sidebranch intraductal papillary mucinous neoplasm (IPMN)  - c/w fluid hydration w/ LR @ 150 for now stop if he tolerated low fat diet  - On clear fluid now will advance to Low fat diet  - GI on board: Target Hct / BUN to make sure they are trending down. EUS on monday-->NPO after midnight  - F/u IgG4 for autoimmune pancreatitis    # Obesity  - diet counselling  - repeat lipid panel : no hypertriglyceridemia  - HBA1C: 5.9%    DVT ppx ; Lovenox  GI ppx : not needed  Ambulate as tolerated   Diet : low fat diet  Code Full  Dispo: EUS on Monday

## 2020-10-04 NOTE — PROGRESS NOTE ADULT - ASSESSMENT
55 years old male with history of HLD, former tobacco use, quit within the past year, who presented to ED with worsening epigastric pain. Admitted for pancreatitis.    Acute, recurrent pancreatitis   - lipase trended down   - IgG4 for autoimmune pancreatitis pending  - MRCP noted  - tolerating advance diet  - currently pain free  - GI and Nutrition notes appreciated  - supplements ordered  - scheduled for EUS in am, NPO after midnight    Obesity  - diet counselling  - patient had been on diet since last hospital stay  - weight reduction needed      DVT ppx ; Lovenox  GI ppx : not needed  Ambulate as tolerated   Diet : Low fat/cholesterol diet, NPO after midnight  Code Full

## 2020-10-04 NOTE — PROGRESS NOTE ADULT - SUBJECTIVE AND OBJECTIVE BOX
24H events:    Patient is a 55y old Male who presents with a chief complaint of Epigastric pain (03 Oct 2020 11:50)  Primary diagnosis of acute on top of chronic pancreatitis  Today is hospital day 3d. This morning patient was seen and examined at bedside.  Patient is pain free, tolerating PO intake with clear/full fluid diet.  No significant events over night.    PAST MEDICAL & SURGICAL HISTORY  Pancreatitis      SOCIAL HISTORY:  Social History:  non- alcoholic  quit smoking a year ago, before 30 pack year h/o smoking (01 Oct 2020 18:04)      ALLERGIES:  No Known Allergies    MEDICATIONS:  STANDING MEDICATIONS  enoxaparin Injectable 40 milliGRAM(s) SubCutaneous at bedtime  folic acid 1 milliGRAM(s) Oral daily  influenza   Vaccine 0.5 milliLiter(s) IntraMuscular once  multivitamin 1 Tablet(s) Oral daily  polyethylene glycol 3350 17 Gram(s) Oral at bedtime  senna 2 Tablet(s) Oral at bedtime  thiamine 100 milliGRAM(s) Oral daily    PRN MEDICATIONS  acetaminophen   Tablet .. 650 milliGRAM(s) Oral every 6 hours PRN    VITALS:   T(F): 97.5  HR: 62  BP: 119/62  RR: 18  SpO2: 97%    PHYSICAL EXAM:  Looks comfortable, Not in pain  HEAD:  Atraumatic, Normocephalic  EYES: EOMI  NECK: Supple  NERVOUS SYSTEM:  Alert & Oriented X3, non focal   CHEST/LUNG: Clear to auscultation bilaterally; No rales, rhonchi, wheezing, or rubs  HEART: Regular rate and rhythm; No murmurs, rubs, or gallops  ABDOMEN: Soft, Nontender, Nondistended; Bowel sounds present  EXTREMITIES:  2+ Peripheral Pulses, No clubbing, cyanosis, or edema  LYMPH: No lymphadenopathy noted  SKIN: No rashes or lesions  LABS:                        13.7   7.76  )-----------( 289      ( 04 Oct 2020 06:42 )             41.4     10-04    143  |  104  |  10  ----------------------------<  89  5.2<H>   |  31  |  0.8    Ca    9.2      04 Oct 2020 06:42  Phos  3.5     10-03  Mg     2.0     10-03    TPro  6.3  /  Alb  3.7  /  TBili  0.5  /  DBili  x   /  AST  14  /  ALT  11  /  AlkPhos  58  10-03      Culture - Blood (collected 02 Oct 2020 06:00)  Source: .Blood None  Preliminary Report (03 Oct 2020 19:01):    No growth to date.          RADIOLOGY:  from: MR Abdomen w/wo IV Cont (10.02.20 @ 22:52) >  1. Findings consistent with acute interstitial edematous pancreatitis, improved since September 20, 2020. No evidence of acute fluid collection.  2. Pancreatic uncinate process 2.2 cm cystic lesion with communication to nondilated main pancreatic duct, most consistent with pancreatic sidebranch IPMN. Follow-up MRCP in 6-12months recommended.  3. Left adrenal 1.1 cm adenoma.

## 2020-10-04 NOTE — PROGRESS NOTE ADULT - SUBJECTIVE AND OBJECTIVE BOX
GALDINOJEFF  55y  Male  HPI:  56 yo male with recurrent pancreatitis and was diagnosed w/ idiopathic pancreatitis (last discharged Sept 23) who presented to ED with worsening epigastric pain. Pt started having epigastric pain since ~9AM today. Pain is severe. No aggravating or alleviating factors. No fever. No vomiting. No chest pain. No SOB. No bm changes. Pt states since his discharge, his abdominal pain has improved until today. States he been on low fat diet.     Of note, pt was supposed to f/u GI for outpatient MRCP. Scheduled but not done.    Vital Signs Last 24 Hrs  T(C): 36.9 (01 Oct 2020 16:09), Max: 37.1 (01 Oct 2020 10:43)  T(F): 98.5 (01 Oct 2020 16:09), Max: 98.7 (01 Oct 2020 10:43)  HR: 83 (01 Oct 2020 16:09) (75 - 83)  BP: 117/74 (01 Oct 2020 16:09) (117/74 - 142/76)  BP(mean): 90 (01 Oct 2020 16:09) (90 - 90)  RR: 20 (01 Oct 2020 16:09) (20 - 20)  SpO2: 99% (01 Oct 2020 16:09) (98% - 99%)    In ED, pt found to have lipase > 3000. Pt admitted for pancreatitis.          (01 Oct 2020 18:04)    MEDICATIONS  (STANDING):  enoxaparin Injectable 40 milliGRAM(s) SubCutaneous at bedtime  folic acid 1 milliGRAM(s) Oral daily  influenza   Vaccine 0.5 milliLiter(s) IntraMuscular once  lactated ringers. 1000 milliLiter(s) (100 mL/Hr) IV Continuous <Continuous>  multivitamin 1 Tablet(s) Oral daily  polyethylene glycol 3350 17 Gram(s) Oral at bedtime  senna 2 Tablet(s) Oral at bedtime  thiamine 100 milliGRAM(s) Oral daily    MEDICATIONS  (PRN):  acetaminophen   Tablet .. 650 milliGRAM(s) Oral every 6 hours PRN Mild Pain (1 - 3)    INTERVAL EVENTS: Patient seen today without distress, no longer has pain... off Morphine    T(C): 36.4 (10-04-20 @ 05:18), Max: 36.4 (10-04-20 @ 05:18)  HR: 62 (10-04-20 @ 05:18) (62 - 85)  BP: 119/62 (10-04-20 @ 05:18) (117/68 - 125/71)  RR: 18 (10-04-20 @ 05:18) (18 - 18)  SpO2: 97% (10-04-20 @ 05:18) (96% - 97%)  Wt(kg): --Vital Signs Last 24 Hrs  T(C): 36.4 (04 Oct 2020 05:18), Max: 36.4 (04 Oct 2020 05:18)  T(F): 97.5 (04 Oct 2020 05:18), Max: 97.5 (04 Oct 2020 05:18)  HR: 62 (04 Oct 2020 05:18) (62 - 85)  BP: 119/62 (04 Oct 2020 05:18) (117/68 - 125/71)  BP(mean): --  RR: 18 (04 Oct 2020 05:18) (18 - 18)  SpO2: 97% (04 Oct 2020 05:18) (96% - 97%)    PHYSICAL EXAM:  GENERAL: NAD  NECK: Supple, No JVD  CHEST/LUNG: Clear  HEART: S1, S2, Regular rate and rhythm  ABDOMEN: Soft, Nontender, Bowel sounds present  EXTREMITIES: No edema    LABS:                        13.7   7.76  )-----------( 289      ( 04 Oct 2020 06:42 )             41.4             10-04    143  |  104  |  10  ----------------------------<  89  5.2<H>   |  31  |  0.8    Ca    9.2      04 Oct 2020 06:42  Phos  3.5     10-03  Mg     2.0     10-03    TPro  6.3  /  Alb  3.7  /  TBili  0.5  /  DBili  x   /  AST  14  /  ALT  11  /  AlkPhos  58  10-03    LIVER FUNCTIONS - ( 03 Oct 2020 07:05 )  Alb: 3.7 g/dL / Pro: 6.3 g/dL / ALK PHOS: 58 U/L / ALT: 11 U/L / AST: 14 U/L / GGT: x           Culture - Blood (collected 02 Oct 2020 06:00)  Source: .Blood None  Preliminary Report (03 Oct 2020 19:01):    No growth to date.    l  RADIOLOGY & ADDITIONAL TESTS:

## 2020-10-05 ENCOUNTER — TRANSCRIPTION ENCOUNTER (OUTPATIENT)
Age: 55
End: 2020-10-05

## 2020-10-05 LAB
ANION GAP SERPL CALC-SCNC: 11 MMOL/L — SIGNIFICANT CHANGE UP (ref 7–14)
BUN SERPL-MCNC: 17 MG/DL — SIGNIFICANT CHANGE UP (ref 10–20)
CALCIUM SERPL-MCNC: 9.2 MG/DL — SIGNIFICANT CHANGE UP (ref 8.5–10.1)
CHLORIDE SERPL-SCNC: 103 MMOL/L — SIGNIFICANT CHANGE UP (ref 98–110)
CO2 SERPL-SCNC: 28 MMOL/L — SIGNIFICANT CHANGE UP (ref 17–32)
CREAT SERPL-MCNC: 0.9 MG/DL — SIGNIFICANT CHANGE UP (ref 0.7–1.5)
GLUCOSE SERPL-MCNC: 97 MG/DL — SIGNIFICANT CHANGE UP (ref 70–99)
HCT VFR BLD CALC: 40.7 % — LOW (ref 42–52)
HGB BLD-MCNC: 13.6 G/DL — LOW (ref 14–18)
LIDOCAIN IGE QN: 47 U/L — SIGNIFICANT CHANGE UP (ref 7–60)
MCHC RBC-ENTMCNC: 30.2 PG — SIGNIFICANT CHANGE UP (ref 27–31)
MCHC RBC-ENTMCNC: 33.4 G/DL — SIGNIFICANT CHANGE UP (ref 32–37)
MCV RBC AUTO: 90.4 FL — SIGNIFICANT CHANGE UP (ref 80–94)
NRBC # BLD: 0 /100 WBCS — SIGNIFICANT CHANGE UP (ref 0–0)
PLATELET # BLD AUTO: 286 K/UL — SIGNIFICANT CHANGE UP (ref 130–400)
POTASSIUM SERPL-MCNC: 4.5 MMOL/L — SIGNIFICANT CHANGE UP (ref 3.5–5)
POTASSIUM SERPL-SCNC: 4.5 MMOL/L — SIGNIFICANT CHANGE UP (ref 3.5–5)
RBC # BLD: 4.5 M/UL — LOW (ref 4.7–6.1)
RBC # FLD: 12.8 % — SIGNIFICANT CHANGE UP (ref 11.5–14.5)
SODIUM SERPL-SCNC: 142 MMOL/L — SIGNIFICANT CHANGE UP (ref 135–146)
WBC # BLD: 8.81 K/UL — SIGNIFICANT CHANGE UP (ref 4.8–10.8)
WBC # FLD AUTO: 8.81 K/UL — SIGNIFICANT CHANGE UP (ref 4.8–10.8)

## 2020-10-05 NOTE — DISCHARGE NOTE PROVIDER - NSDCMRMEDTOKEN_GEN_ALL_CORE_FT
folic acid 1 mg oral tablet: 1 tab(s) orally once a day  Multiple Vitamins oral tablet: 1 tab(s) orally once a day  senna oral tablet: 2 tab(s) orally once a day (at bedtime)  thiamine 100 mg oral tablet: 1 tab(s) orally once a day

## 2020-10-05 NOTE — PROGRESS NOTE ADULT - SUBJECTIVE AND OBJECTIVE BOX
Chart reviewed, patient examined. Pertinent results reviewed.  Case discussed with HO; specialist f/u reviewed  HD#4; Readmit 1 wk post DC    JEFF AHMADI    HPI:  56 yo male with recurrent pancreatitis and was diagnosed w/ idiopathic pancreatitis (last discharged Sept 23) who presented to ED with worsening epigastric pain. Pt started having epigastric pain since ~9AM the DOA. Pain was severe. No aggravating or alleviating factors. No fever. No vomiting. No chest pain. No SOB. No bm changes. Pt states since his discharge, his abdominal pain has improved until today. States he been on low fat diet.     Of note, pt was supposed to f/u GI for outpatient MRCP. Scheduled but not done.  FH: father had Pancreatitis (ETOH? as cause)    In ED, pt found to have lipase > 3000. Pt admitted for pancreatitis. It has trended down since; he is feeling better, and was fed a low fat diet yesterday, which he tolerated.          (01 Oct 2020 18:04)    MEDICATIONS  (STANDING):  enoxaparin Injectable 40 milliGRAM(s) SubCutaneous at bedtime  folic acid 1 milliGRAM(s) Oral daily  influenza   Vaccine 0.5 milliLiter(s) IntraMuscular once  lactated ringers. 1000 milliLiter(s) (100 mL/Hr) IV Continuous <Continuous>  multivitamin 1 Tablet(s) Oral daily  polyethylene glycol 3350 17 Gram(s) Oral at bedtime  senna 2 Tablet(s) Oral at bedtime  thiamine 100 milliGRAM(s) Oral daily    MEDICATIONS  (PRN):  acetaminophen   Tablet .. 650 milliGRAM(s) Oral every 6 hours PRN Mild Pain (1 - 3)    INTERVAL EVENTS: Patient seen today without distress, no longer has pain... off Morphine    Vital Signs Last 24 Hrs  T(C): 36.3 (05 Oct 2020 08:39), Max: 36.8 (04 Oct 2020 19:03)  T(F): 97.4 (05 Oct 2020 08:39), Max: 98.3 (04 Oct 2020 19:03)  HR: 72 (05 Oct 2020 08:39) (72 - 76)  BP: 107/64 (05 Oct 2020 08:39) (107/64 - 151/65)  BP(mean): --  RR: 18 (05 Oct 2020 08:39) (18 - 18)  SpO2: 97% (05 Oct 2020 08:39) (97% - 97%)    PHYSICAL EXAM:  GENERAL: NAD; overweight  NECK: Supple, No JVD  CHEST/LUNG: Clear  HEART: RRR, no MRG  ABDOMEN: Soft, Nontender, Bowel sounds present  EXTREMITIES: No edema    LABS:                              13.6   8.81  )-----------( 286      ( 05 Oct 2020 07:13 )             40.7                   13.7   7.76  )-----------( 289      ( 04 Oct 2020 06:42 )             41.4     10-04    143  |  104  |  10  ----------------------------<  89  5.2<H>   |  31  |  0.8    Ca    9.2      04 Oct 2020 06:42                10-04    143  |  104  |  10  ----------------------------<  89  5.2<H>   |  31  |  0.8    Ca    9.2      04 Oct 2020 06:42  Phos  3.5     10-03  Mg     2.0     10-03    TPro  6.3  /  Alb  3.7  /  TBili  0.5  /  DBili  x   /  AST  14  /  ALT  11  /  AlkPhos  58  10-03    LIVER FUNCTIONS - ( 03 Oct 2020 07:05 )  Alb: 3.7 g/dL / Pro: 6.3 g/dL / ALK PHOS: 58 U/L / ALT: 11 U/L / AST: 14 U/L / GGT: x           Culture - Blood (collected 02 Oct 2020 06:00)  Source: .Blood None  Preliminary Report (03 Oct 2020 19:01):    No growth to date.    l  RADIOLOGY & ADDITIONAL TESTS:

## 2020-10-05 NOTE — DISCHARGE NOTE PROVIDER - HOSPITAL COURSE
55 years old male with history of HLD, former tobacco use, quit within the past year,   who presented to ED with worsening epigastric pain. Admitted for pancreatitis.    # Acute on chronic idiopathic pancreatitis   - Complicated with cyst formation in the uncinate process  - Acute onset abdominal pain, lipase >3000 trending 55  - Unclear etiology, no stones on abd U/S, no ETOH hx, no new meds, TG and Ca wnl. BAL <10  - US abd: pancreas poorly visualized, no cholecystitis  - MRCP 10/2: Improving interstitial edematous pancreatitis. No acute fluid collection.   Pancreatic uncinate process 2.2 cm cystic lesion w/ communication to nondilated main pancreatic duct, most consistent with pancreatic sidebranch intraductal papillary mucinous neoplasm (IPMN)  - c/w fluid hydration w/ LR @ 150 for now stop if he tolerated low fat diet  - On clear fluid now will advance to Low fat diet  - GI on board: Target Hct / BUN to make sure they are trending down. EUS on monday-->NPO after midnight  - F/u IgG4 for autoimmune pancreatitis  -EUS done:     # Obesity  - diet counselling  - repeat lipid panel : no hypertriglyceridemia  - HBA1C: 5.9%    Patient seen and examined , clinically and hemodynamically stable today. 55 years old male with history of HLD, former tobacco use, quit within the past year,   who presented to ED with worsening epigastric pain. Admitted for pancreatitis.    # Acute on chronic idiopathic pancreatitis   - Complicated with cyst formation in the uncinate process  - Acute onset abdominal pain, lipase >3000 trending 55  - Unclear etiology, no stones on abd U/S, no ETOH hx, no new meds, TG and Ca wnl. BAL <10  - US abd: pancreas poorly visualized, no cholecystitis  - MRCP 10/2: Improving interstitial edematous pancreatitis. No acute fluid collection.   Pancreatic uncinate process 2.2 cm cystic lesion w/ communication to nondilated main pancreatic duct, most consistent with pancreatic sidebranch intraductal papillary mucinous neoplasm (IPMN)  - c/w fluid hydration w/ LR @ 150 for now stop if he tolerated low fat diet  - On clear fluid now will advance to Low fat diet  - GI on board: s/p EUS and FNA that was complicated by pancreatitis  - F/u IgG4 for autoimmune pancreatitis-->negative      # Obesity  - diet counselling  - repeat lipid panel : no hypertriglyceridemia  - HBA1C: 5.9%    Patient seen and examined , clinically and hemodynamically stable today.

## 2020-10-05 NOTE — PROGRESS NOTE ADULT - ASSESSMENT
55 years old male with history of HLD, former tobacco use, quit within the past year, who presented to ED with worsening epigastric pain. Admitted for pancreatitis.    Acute, recurrent pancreatitis - of unknown etiology  - lipase trended down   - IgG4 for autoimmune pancreatitis pending;     GI also recommends a genetic panel- please order  - MRCP noted  - tolerating advance diet  - currently pain free  - GI and Nutrition notes appreciated  - supplements ordered  - scheduled for EUS today, NPO after midnight  - DC planning after procedure with GI to clear    Obesity  - diet counselling  - patient had been on diet since last hospital stay  - weight reduction needed      DVT ppx ; Lovenox  GI ppx : not needed  Ambulate as tolerated   Diet : Low fat/cholesterol diet, NPO after midnight  Code Full

## 2020-10-05 NOTE — DISCHARGE NOTE PROVIDER - CARE PROVIDER_API CALL
ENEDELIA VÁZQUEZ  [id number]  [address] [facility] [address]  [place] [address]  Phone: ()-  Fax: ()-  Follow Up Time: 2 weeks    [place] [address]  Phone: [phone]  Fax: [phone]  Follow Up Time: 1 week   ENEDELIA VÁZQUEZ  [id number]  [address] [facility] [address]  [place] [address]  Phone: ()-  Fax: ()-  Follow Up Time: 2 weeks    Lashae Lowe  [address] [address]  Phone: [phone]  Fax: [phone]  Follow Up Time: 1 week    Sher Malik)  Gastroenterology; Internal Medicine  [address] [place] [id number]  Phone: [phone]  Fax: [phone]  Follow Up Time: 1 week

## 2020-10-05 NOTE — DISCHARGE NOTE PROVIDER - NSDCCPCAREPLAN_GEN_ALL_CORE_FT
PRINCIPAL DISCHARGE DIAGNOSIS  Diagnosis: Recurrent pancreatitis  Assessment and Plan of Treatment: Pancreatitis is a condition that can cause severe belly pain.  The pancreas is an organ that makes hormones and juices that help break down food. Pancreatitis is the term for when this organ gets irritated or swollen.  Most people get over pancreatitis without any long-lasting effects. But a few people get debby sick.  here are many causes of pancreatitis. But most cases are caused by gallstones or alcohol abuse:  ?Gallstones – Gallstones are hard lumps that form inside an organ called the gallbladder. Both the pancreas and the gallbladder drain into a single tube. If that tube gets clogged by a gallstone, neither of the organs can drain. When that happens, the fluids from both organs get backed up. That can cause pain.  ?Alcohol abuse – People who drink too much alcohol for too long sometimes get alcohol-related pancreatitis. People with this form of pancreatitis usually start to feel pain 1 to 3 days after drinking a lot of alcohol or after they suddenly stop drinking. They usually also have nausea and vomiting.         PRINCIPAL DISCHARGE DIAGNOSIS  Diagnosis: Recurrent pancreatitis  Assessment and Plan of Treatment: Please follow up FNA result  Pancreatitis is a condition that can cause severe belly pain.  The pancreas is an organ that makes hormones and juices that help break down food. Pancreatitis is the term for when this organ gets irritated or swollen.  Most people get over pancreatitis without any long-lasting effects. But a few people get debby sick.  here are many causes of pancreatitis. But most cases are caused by gallstones or alcohol abuse:  ?Gallstones – Gallstones are hard lumps that form inside an organ called the gallbladder. Both the pancreas and the gallbladder drain into a single tube. If that tube gets clogged by a gallstone, neither of the organs can drain. When that happens, the fluids from both organs get backed up. That can cause pain.  ?Alcohol abuse – People who drink too much alcohol for too long sometimes get alcohol-related pancreatitis. People with this form of pancreatitis usually start to feel pain 1 to 3 days after drinking a lot of alcohol or after they suddenly stop drinking. They usually also have nausea and vomiting.

## 2020-10-05 NOTE — MEDICAL STUDENT PROGRESS NOTE(EDUCATION) - SUBJECTIVE AND OBJECTIVE BOX
----------Daily Progress Note----------    HISTORY OF PRESENT ILLNESS:  Patient is a 55y old Male who presents with a chief complaint of Epigastric pain (04 Oct 2020 11:06)    Currently admitted to medicine with the primary diagnosis of Epigastric pain       Today is hospital day 4d.     INTERVAL HOSPITAL COURSE / OVERNIGHT EVENTS:    Patient was seen and examined at the bedside. This morning he is feeling well and reports no belly or bodily pain. He denies any new or significant overnight events.    Review of Systems: Otherwise unremarkable     PAST MEDICAL & SURGICAL HISTORY:  Pancreatitis      ALLERGIES:  No Known Allergies    MEDICATIONS:  STANDING MEDICATIONS  folic acid 1 milliGRAM(s) Oral daily  influenza   Vaccine 0.5 milliLiter(s) IntraMuscular once  multivitamin 1 Tablet(s) Oral daily  polyethylene glycol 3350 17 Gram(s) Oral at bedtime  senna 2 Tablet(s) Oral at bedtime  thiamine 100 milliGRAM(s) Oral daily    PRN MEDICATIONS  acetaminophen   Tablet .. 650 milliGRAM(s) Oral every 6 hours PRN    VITALS:  T(F): 97.4  HR: 72  BP: 107/64  RR: 18  SpO2: 97%    PHYSICAL EXAM:  GENERAL: Well developed, alert and in no acute distress.  HEENT: Mucous membranes moist, bilateral sclera anicteric, no conjunctival injection.  Neck: Supple, non-tender, no lymphadenopathy.  PULMONARY: Clear to auscultation bilaterally. No wheezing, rhonchi or rales.  CARDIOVASCULAR: Regular rate and rhythm, S1-S2.  GASTROINTESTINAL: Soft, non-tender, non-distended, no guarding. Bowel sounds present.  SKIN/EXTREMITIES: No edema  NEUROLOGIC/MUSCULOSKELETAL: AOx3, No focal deficits.    LABS:                        13.7   7.76  )-----------( 289      ( 04 Oct 2020 06:42 )             41.4     10-04    143  |  104  |  10  ----------------------------<  89  5.2<H>   |  31  |  0.8    Ca    9.2      04 Oct 2020 06:42        RADIOLOGY:    -RUQ US of Abdomen (01 Oct 2020)  --> No evidence of cholecystitis. Pancreas obscured by bowel gas.    -MRCP/MRI of Abdomen w/ and w/o contrast (02 Oct 2020) --> Acute interstitial edematous pancreatitis w/ no evidence of fluid collection. 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 Admitted for pancreatitis.    # Acute on chronic idiopathic pancreatitis   - Acutely severe abdominal pain w/ lipase >3000 -- Lipase trending down, 55 (03 Oct 2020) from >3,000 (01 Oct 2020)  -Associated with 2.2cm cystic lesion of pancreatic uncinate process communicating with main pancreatic duct  - MRCP findings (02 Oct 2020)-- Acute interstitial edematous pancreatitis w/ no evidence of fluid collection. 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)  -Abdominal US of RUQ (01 Oct 2020) --R/O cholecystitis  -EUS scheduled today  - F/U IgG4 --still pending  - anticipate d/c tomorrow        # Obesity  - diet counselling  - repeat lipid panel : no hypertriglyceridemia  - HBA1C: 5.9%    DVT ppx ; Lovenox  GI ppx : not needed  Ambulate as tolerated   Diet : low fat diet  Code Full  Dispo: EUS today  Anticipate D/C tomorrow           HISTORY OF PRESENT ILLNESS:  Patient is a 55y old Male who presents with a chief complaint of Epigastric pain (04 Oct 2020 11:06)  Currently admitted to medicine with the primary diagnosis of acute Pancreatitis  Today is hospital day 4d.     INTERVAL HOSPITAL COURSE / OVERNIGHT EVENTS:    Patient was seen and examined at the bedside. This morning he is feeling well and reports no belly or bodily pain. He denies any new or significant overnight events. Patient tolerated low fat diet.    Review of Systems: Otherwise unremarkable     PAST MEDICAL & SURGICAL HISTORY:  Pancreatitis      ALLERGIES:  No Known Allergies    MEDICATIONS:  STANDING MEDICATIONS  folic acid 1 milliGRAM(s) Oral daily  influenza   Vaccine 0.5 milliLiter(s) IntraMuscular once  multivitamin 1 Tablet(s) Oral daily  polyethylene glycol 3350 17 Gram(s) Oral at bedtime  senna 2 Tablet(s) Oral at bedtime  thiamine 100 milliGRAM(s) Oral daily    PRN MEDICATIONS  acetaminophen   Tablet .. 650 milliGRAM(s) Oral every 6 hours PRN    VITALS:  T(F): 97.4  HR: 72  BP: 107/64  RR: 18  SpO2: 97%    PHYSICAL EXAM:  GENERAL: Well developed, alert and in no acute distress.  HEENT: Mucous membranes moist, bilateral sclera anicteric, no conjunctival injection.  Neck: Supple, non-tender, no lymphadenopathy.  PULMONARY: Clear to auscultation bilaterally. No wheezing, rhonchi or rales.  CARDIOVASCULAR: Regular rate and rhythm, S1-S2.  GASTROINTESTINAL: Soft, non-tender, non-distended, no guarding. Bowel sounds present.  SKIN/EXTREMITIES: No edema  NEUROLOGIC/MUSCULOSKELETAL: AOx3, No focal deficits.    LABS:                        13.7   7.76  )-----------( 289      ( 04 Oct 2020 06:42 )             41.4     10-04    143  |  104  |  10  ----------------------------<  89  5.2<H>   |  31  |  0.8    Ca    9.2      04 Oct 2020 06:42        RADIOLOGY:    -RUQ US of Abdomen (01 Oct 2020)  --> No evidence of cholecystitis. Pancreas obscured by bowel gas.    -MRCP/MRI of Abdomen w/ and w/o contrast (02 Oct 2020) --> Acute interstitial edematous pancreatitis w/ no evidence of fluid collection. 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 Admitted for pancreatitis.    # Acute pancreatitis in the setting of recurrent pancreatitis  -Acutely severe abdominal pain w/ lipase >3000 -- Lipase trending down, 55 (03 Oct 2020) from >3,000 (01 Oct 2020)  -Associated with 2.2cm cystic lesion of pancreatic uncinate process communicating with main pancreatic duct  - MRCP findings (02 Oct 2020)-- Acute interstitial edematous pancreatitis w/ no evidence of fluid collection. 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  - Plan of care discussed with Dr Perez, the hospitalist, medical team including the student

## 2020-10-05 NOTE — DISCHARGE NOTE PROVIDER - CARE PROVIDERS DIRECT ADDRESSES
,[email or web address]_Unknown [email or web address],DirectAddress_Unknown,[email or web address]

## 2020-10-05 NOTE — DISCHARGE NOTE PROVIDER - PROVIDER TOKENS
PROVIDER:[TOKEN:[[id number]],FOLLOWUP:[2 weeks]],PROVIDER:[TOKEN:[[id number]],FOLLOWUP:[1 week]] PROVIDER:[TOKEN:[[id number]],FOLLOWUP:[2 weeks]],PROVIDER:[TOKEN:[[id number]],FOLLOWUP:[1 week]],PROVIDER:[TOKEN:[[id number]],FOLLOWUP:[1 week]]

## 2020-10-06 ENCOUNTER — RESULT REVIEW (OUTPATIENT)
Age: 55
End: 2020-10-06

## 2020-10-06 ENCOUNTER — TRANSCRIPTION ENCOUNTER (OUTPATIENT)
Age: 55
End: 2020-10-06

## 2020-10-06 ENCOUNTER — APPOINTMENT (OUTPATIENT)
Dept: GASTROENTEROLOGY | Facility: CLINIC | Age: 55
End: 2020-10-06

## 2020-10-06 LAB
ALBUMIN SERPL ELPH-MCNC: 3.9 G/DL — SIGNIFICANT CHANGE UP (ref 3.5–5.2)
ALP SERPL-CCNC: 53 U/L — SIGNIFICANT CHANGE UP (ref 30–115)
ALT FLD-CCNC: 29 U/L — SIGNIFICANT CHANGE UP (ref 0–41)
ANION GAP SERPL CALC-SCNC: 10 MMOL/L — SIGNIFICANT CHANGE UP (ref 7–14)
AST SERPL-CCNC: 39 U/L — SIGNIFICANT CHANGE UP (ref 0–41)
BILIRUB SERPL-MCNC: 0.4 MG/DL — SIGNIFICANT CHANGE UP (ref 0.2–1.2)
BUN SERPL-MCNC: 20 MG/DL — SIGNIFICANT CHANGE UP (ref 10–20)
CALCIUM SERPL-MCNC: 9.4 MG/DL — SIGNIFICANT CHANGE UP (ref 8.5–10.1)
CHLORIDE SERPL-SCNC: 103 MMOL/L — SIGNIFICANT CHANGE UP (ref 98–110)
CO2 SERPL-SCNC: 30 MMOL/L — SIGNIFICANT CHANGE UP (ref 17–32)
CREAT SERPL-MCNC: 0.9 MG/DL — SIGNIFICANT CHANGE UP (ref 0.7–1.5)
GLUCOSE SERPL-MCNC: 89 MG/DL — SIGNIFICANT CHANGE UP (ref 70–99)
HCT VFR BLD CALC: 41.5 % — LOW (ref 42–52)
HGB BLD-MCNC: 13.7 G/DL — LOW (ref 14–18)
IGG4 SER-MCNC: 43.4 MG/DL — SIGNIFICANT CHANGE UP (ref 2.4–121)
MCHC RBC-ENTMCNC: 29.7 PG — SIGNIFICANT CHANGE UP (ref 27–31)
MCHC RBC-ENTMCNC: 33 G/DL — SIGNIFICANT CHANGE UP (ref 32–37)
MCV RBC AUTO: 90 FL — SIGNIFICANT CHANGE UP (ref 80–94)
NRBC # BLD: 0 /100 WBCS — SIGNIFICANT CHANGE UP (ref 0–0)
PLATELET # BLD AUTO: 314 K/UL — SIGNIFICANT CHANGE UP (ref 130–400)
POTASSIUM SERPL-MCNC: 4.7 MMOL/L — SIGNIFICANT CHANGE UP (ref 3.5–5)
POTASSIUM SERPL-SCNC: 4.7 MMOL/L — SIGNIFICANT CHANGE UP (ref 3.5–5)
PROT SERPL-MCNC: 6.3 G/DL — SIGNIFICANT CHANGE UP (ref 6–8)
RBC # BLD: 4.61 M/UL — LOW (ref 4.7–6.1)
RBC # FLD: 12.7 % — SIGNIFICANT CHANGE UP (ref 11.5–14.5)
SODIUM SERPL-SCNC: 143 MMOL/L — SIGNIFICANT CHANGE UP (ref 135–146)
WBC # BLD: 9.43 K/UL — SIGNIFICANT CHANGE UP (ref 4.8–10.8)
WBC # FLD AUTO: 9.43 K/UL — SIGNIFICANT CHANGE UP (ref 4.8–10.8)

## 2020-10-06 PROCEDURE — 88342 IMHCHEM/IMCYTCHM 1ST ANTB: CPT | Mod: 26

## 2020-10-06 PROCEDURE — 88312 SPECIAL STAINS GROUP 1: CPT | Mod: 26

## 2020-10-06 PROCEDURE — 88305 TISSUE EXAM BY PATHOLOGIST: CPT | Mod: 26

## 2020-10-06 PROCEDURE — 88173 CYTOPATH EVAL FNA REPORT: CPT | Mod: 26

## 2020-10-06 PROCEDURE — 88313 SPECIAL STAINS GROUP 2: CPT | Mod: 26

## 2020-10-06 PROCEDURE — 88172 CYTP DX EVAL FNA 1ST EA SITE: CPT | Mod: 26

## 2020-10-06 RX ORDER — CIPROFLOXACIN LACTATE 400MG/40ML
VIAL (ML) INTRAVENOUS
Refills: 0 | Status: DISCONTINUED | OUTPATIENT
Start: 2020-10-06 | End: 2020-10-08

## 2020-10-06 RX ORDER — ONDANSETRON 8 MG/1
4 TABLET, FILM COATED ORAL ONCE
Refills: 0 | Status: COMPLETED | OUTPATIENT
Start: 2020-10-06 | End: 2020-10-06

## 2020-10-06 RX ORDER — MORPHINE SULFATE 50 MG/1
4 CAPSULE, EXTENDED RELEASE ORAL ONCE
Refills: 0 | Status: DISCONTINUED | OUTPATIENT
Start: 2020-10-06 | End: 2020-10-06

## 2020-10-06 RX ORDER — INDOMETHACIN 50 MG
50 CAPSULE ORAL ONCE
Refills: 0 | Status: COMPLETED | OUTPATIENT
Start: 2020-10-06 | End: 2020-10-06

## 2020-10-06 RX ORDER — CIPROFLOXACIN LACTATE 400MG/40ML
400 VIAL (ML) INTRAVENOUS ONCE
Refills: 0 | Status: COMPLETED | OUTPATIENT
Start: 2020-10-06 | End: 2020-10-06

## 2020-10-06 RX ORDER — CIPROFLOXACIN LACTATE 400MG/40ML
400 VIAL (ML) INTRAVENOUS EVERY 12 HOURS
Refills: 0 | Status: DISCONTINUED | OUTPATIENT
Start: 2020-10-07 | End: 2020-10-08

## 2020-10-06 RX ORDER — HYDROMORPHONE HYDROCHLORIDE 2 MG/ML
1 INJECTION INTRAMUSCULAR; INTRAVENOUS; SUBCUTANEOUS ONCE
Refills: 0 | Status: DISCONTINUED | OUTPATIENT
Start: 2020-10-06 | End: 2020-10-06

## 2020-10-06 RX ORDER — SODIUM CHLORIDE 9 MG/ML
1000 INJECTION, SOLUTION INTRAVENOUS
Refills: 0 | Status: DISCONTINUED | OUTPATIENT
Start: 2020-10-06 | End: 2020-10-07

## 2020-10-06 RX ORDER — MORPHINE SULFATE 50 MG/1
2 CAPSULE, EXTENDED RELEASE ORAL ONCE
Refills: 0 | Status: DISCONTINUED | OUTPATIENT
Start: 2020-10-06 | End: 2020-10-06

## 2020-10-06 RX ORDER — SODIUM CHLORIDE 9 MG/ML
1000 INJECTION, SOLUTION INTRAVENOUS
Refills: 0 | Status: DISCONTINUED | OUTPATIENT
Start: 2020-10-06 | End: 2020-10-06

## 2020-10-06 RX ADMIN — MORPHINE SULFATE 2 MILLIGRAM(S): 50 CAPSULE, EXTENDED RELEASE ORAL at 23:24

## 2020-10-06 RX ADMIN — Medication 1 TABLET(S): at 11:17

## 2020-10-06 RX ADMIN — Medication 200 MILLIGRAM(S): at 16:41

## 2020-10-06 RX ADMIN — MORPHINE SULFATE 4 MILLIGRAM(S): 50 CAPSULE, EXTENDED RELEASE ORAL at 23:58

## 2020-10-06 RX ADMIN — Medication 50 MILLIGRAM(S): at 16:42

## 2020-10-06 RX ADMIN — Medication 1 MILLIGRAM(S): at 11:17

## 2020-10-06 RX ADMIN — ONDANSETRON 4 MILLIGRAM(S): 8 TABLET, FILM COATED ORAL at 18:55

## 2020-10-06 RX ADMIN — SODIUM CHLORIDE 500 MILLILITER(S): 9 INJECTION, SOLUTION INTRAVENOUS at 17:52

## 2020-10-06 RX ADMIN — SODIUM CHLORIDE 100 MILLILITER(S): 9 INJECTION, SOLUTION INTRAVENOUS at 22:09

## 2020-10-06 RX ADMIN — Medication 100 MILLIGRAM(S): at 11:17

## 2020-10-06 RX ADMIN — HYDROMORPHONE HYDROCHLORIDE 1 MILLIGRAM(S): 2 INJECTION INTRAMUSCULAR; INTRAVENOUS; SUBCUTANEOUS at 17:45

## 2020-10-06 NOTE — PRE-ANESTHESIA EVALUATION ADULT - NSANTHOSAYNRD_GEN_A_CORE
No. LINDY screening performed.  STOP BANG Legend: 0-2 = LOW Risk; 3-4 = INTERMEDIATE Risk; 5-8 = HIGH Risk

## 2020-10-06 NOTE — PROGRESS NOTE ADULT - ASSESSMENT
55 years old male with history of HLD, former tobacco use, quit within the past year, who presented to ED with worsening epigastric pain. Admitted for pancreatitis.    Acute, recurrent pancreatitis   - lipase trended down   - MRCP noted  - tolerating advance diet  - remains pain free  - GI and Nutrition notes appreciated  - supplements ordered  - scheduled for EUS today  - further plan pending these results    Obesity  - diet counselling  - patient had been on diet since last hospital stay  - weight reduction needed      DVT ppx ; Lovenox  GI ppx : not needed  Ambulate as tolerated   Diet : Low fat/cholesterol diet, NPO after midnight  Code Full

## 2020-10-06 NOTE — MEDICAL STUDENT PROGRESS NOTE(EDUCATION) - SUBJECTIVE AND OBJECTIVE BOX
----------Daily Progress Note----------    HISTORY OF PRESENT ILLNESS:  Patient is a 55y old Male who presents with a chief complaint of Epigastric pain (05 Oct 2020 09:12)    Currently admitted to medicine with the primary diagnosis of Recurrent pancreatitis       Today is hospital day 5d.     INTERVAL HOSPITAL COURSE / OVERNIGHT EVENTS:    Patient was seen and examined at the bedside. This morning he is feeling well and reports no new or significant overnight events. Patient reports he is eager to go home whenever that will be made possible.    Review of Systems: Otherwise unremarkable     PAST MEDICAL & SURGICAL HISTORY:  Pancreatitis      ALLERGIES  No Known Allergies    MEDICATIONS:  STANDING MEDICATIONS  folic acid 1 milliGRAM(s) Oral daily  influenza   Vaccine 0.5 milliLiter(s) IntraMuscular once  multivitamin 1 Tablet(s) Oral daily  polyethylene glycol 3350 17 Gram(s) Oral at bedtime  senna 2 Tablet(s) Oral at bedtime  thiamine 100 milliGRAM(s) Oral daily    PRN MEDICATIONS  acetaminophen   Tablet .. 650 milliGRAM(s) Oral every 6 hours PRN    VITALS:  T(F): 97.5  HR: 65  BP: 111/65  RR: 18  SpO2: 97%    PHYSICAL EXAM:  GENERAL: Well developed, alert and in no acute distress.  HEENT:  Bilateral sclera anicteric, no conjunctival injection.  Neck: Supple, non-tender, no lymphadenopathy.  PULMONARY: Clear to auscultation bilaterally. No wheezing, rhonchi or rales.  CARDIOVASCULAR: Regular rate and rhythm, S1-S2.  GASTROINTESTINAL: Soft, non-tender, non-distended, no guarding. Bowel sounds present.  SKIN/EXTREMITIES: No edema  NEUROLOGIC/MUSCULOSKELETAL: AOx3, No focal deficits.    LABS:                        13.7   9.43  )-----------( 314      ( 06 Oct 2020 05:23 )             41.5     10-06    143  |  103  |  20  ----------------------------<  89  4.7   |  30  |  0.9    Ca    9.4      06 Oct 2020 05:23    TPro  6.3  /  Alb  3.9  /  TBili  0.4  /  DBili  x   /  AST  39  /  ALT  29  /  AlkPhos  53  10-06      RADIOLOGY:    -RUQ US of Abdomen (01 Oct 2020)  --> No evidence of cholecystitis. Pancreas obscured by bowel gas.    -MRCP/MRI of Abdomen w/ and w/o contrast (02 Oct 2020) --> Acute interstitial edematous pancreatitis w/ no evidence of fluid collection. 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 Admitted for pancreatitis.    # Acute pancreatitis in the setting of recurrent pancreatitis  -Acutely severe abdominal pain w/ lipase >3000 -- Lipase trending down, 47 (05 Oct 2020) from >3,000 (01 Oct 2020)  -Associated with 2.2cm cystic lesion of pancreatic uncinate process communicating with main pancreatic duct  - MRCP findings (02 Oct 2020)-- Acute interstitial edematous pancreatitis w/ no evidence of fluid collection. 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    # Obesity  - diet counselling  - repeat lipid panel : no hypertriglyceridemia  - HBA1C: 5.9%    DVT ppx ; Lovenox  GI ppx : not needed  Ambulate as tolerated   Diet : low fat diet  Code Full  Dispo: EUS today  Anticipate D/C later today or tomorrow

## 2020-10-06 NOTE — PRE-ANESTHESIA EVALUATION ADULT - WEIGHT IN KG
The patient present for follow-up prior to schedule thyroid surgery.  No changes noted.  I discussed the nature of the disease process and the risk of surgery including, temporary or permanent hypoparathyroidism resulting in low blood calcium levels that require extensive medication to avoid serious degenerative conditions such as cataracts, brittle bones, muscle weakness and muscle irritability.  Other risks include bleeding, infection, injury to the recurrent laryngeal nerves resulting in hoarseness or impairment of speech or trouble breathing resulting in the need for tracheostomy tube placement and the risks of general anesthetic including MI, CVA, sudden death or even reaction to anesthetic medications. The patient understands the risks, benefits and treatment alternatives.  Any and all questions were answered to the patient's satisfaction. Written consent was obtained.    Will follow up FNA.    See the original consult note below:    Consult Note  Endocrine Surgery    Visit Diagnosis: Thyroid nodule [E04.1]    SUBJECTIVE:     Nemesio Galarza Jr. is a 65 y.o. male seen at the request of Dr. Becky Man today in the Endocrine Surgery Clinic for evaluation of thyroid nodule(s). His history dates back to April 2018 when patient was undergoing testing and a thyroid mass was identified as an incidental finding. Subsequent evaluation included referral to an endocrinologist and neck ultrasound. Nemesio Galarza Jr. complains of nothing currently. The patient denies hot/cold intolerance, fatigue, unexplained weight changes, difficulty with swallowing, difficulty with shortness of breath especially with postural changes, change in voice quality, palpable neck mass and growth of thyroid nodule over time. He does not have a history of head and neck radiation therapy(Had radiation in abdomen and thigh - 40 rounds-2006,  Second round of radiation 2014). He does not have a family history of thyroid disease. He is not  taking thyroid hormone supplementation. He has not undergone radioactive iodine treatment.        Review of patient's allergies indicates:   Allergen Reactions    Ciprofloxacin     Ritalin [methylphenidate]        Current Outpatient Prescriptions   Medication Sig Dispense Refill    albuterol 90 mcg/actuation inhaler Inhale 2 puffs into the lungs every 6 (six) hours as needed for Wheezing or Shortness of Breath. Rescue 6.7 g 0    alfuzosin (UROXATRAL) 10 mg Tb24 Take 10 mg by mouth.      alfuzosin (UROXATRAL) 10 mg Tb24 TAKE 1 TABLET(10 MG) BY MOUTH EVERY DAY 90 tablet 0    amLODIPine (NORVASC) 10 MG tablet TAKE 1 TABLET(10 MG) BY MOUTH EVERY DAY 90 tablet 0    amLODIPine (NORVASC) 5 MG tablet TAKE 1-2 TABLETS BY MOUTH EVERY DAY 60 tablet 12    benzonatate (TESSALON PERLES) 100 MG capsule Take 1 capsule (100 mg total) by mouth every 6 (six) hours as needed for Cough. 30 capsule 1    celecoxib (CELEBREX) 200 MG capsule TAKE 1 CAPSULE(200 MG) BY MOUTH TWICE DAILY 180 capsule 1    celecoxib (CELEBREX) 200 MG capsule Take 200 mg by mouth.      chlorpheniramine (CHLORPHEN SR) 12 mg TbSR Take 1 tablet by mouth every 12 (twelve) hours as needed.  0    diphenoxylate-atropine 2.5-0.025 mg (LOMOTIL) 2.5-0.025 mg per tablet TAKE 1 TABLET BY MOUTH FOUR TIMES DAILY AS NEEDED FOR DIARRHEA 120 tablet 0    fluticasone (FLONASE) 50 mcg/actuation nasal spray 1 spray by Each Nare route once daily. 1 Bottle 2    gabapentin (NEURONTIN) 300 MG capsule Take 1 capsule (300 mg total) by mouth 3 (three) times daily. 90 capsule 2    lenalidomide (REVLIMID) 10 mg Cap TAKE 1 CAPSULE BY MOUTH EVERY OTHER DAY auth # [id number] on 7/6/18 14 each 0    loperamide (IMODIUM) 2 mg capsule Take 2 mg by mouth.      pravastatin (PRAVACHOL) 40 MG tablet Take 1 tablet (40 mg total) by mouth once daily. 90 tablet 12    pravastatin (PRAVACHOL) 40 MG tablet TAKE 1 TABLET BY MOUTH EVERY DAY 90 tablet 0    levocetirizine (XYZAL) 5 MG tablet Take 1  "tablet (5 mg total) by mouth every evening. 30 tablet 2    morphine (MS CONTIN) 30 MG 12 hr tablet Take 1 tablet (30 mg total) by mouth 3 (three) times daily before meals. 90 tablet 0    oxyCODONE (ROXICODONE) 10 mg Tab immediate release tablet Take 1 tablet (10 mg total) by mouth every 6 (six) hours as needed for Pain. 120 tablet 0     Current Facility-Administered Medications   Medication Dose Route Frequency Provider Last Rate Last Dose    lidocaine (PF) 20 mg/ml (2%) injection 200 mg  10 mL Intradermal Once Fátima Tomlinson NP           Past Medical History:   Diagnosis Date    Acute renal failure 7/23/2014    Axonal polyneuropathy 7/9/2013    BPH (benign prostatic hypertrophy) 7/9/2013    Cancer     Cataract     Chronic pain 7/3/2014    Elevated PSA 3/18/2016    HTN (hypertension) 7/9/2013    Hyperlipidemia     Hypertension     Multiple myeloma in remission 1/7/2013    Multiple myeloma, without mention of having achieved remission 9/12/2013    Personal history of multiple myeloma     Prostatitis, acute 11/5/2012     Past Surgical History:   Procedure Laterality Date    CYST REMOVAL       Social History   Substance Use Topics    Smoking status: Former Smoker     Quit date: 1/7/1998    Smokeless tobacco: Never Used    Alcohol use No          Review of Systems:    Review of Systems      OBJECTIVE:     Vital Signs:  BP (!) 162/98 (BP Location: Left arm, Patient Position: Sitting, BP Method: Medium (Manual))   Ht 6' 1" (1.854 m)   Wt 90 kg (198 lb 6.6 oz)   BMI 26.18 kg/m²    Body mass index is 26.18 kg/m².      Physical Exam:    General:  no distress, see vitals for BMI    Eyes:  conjunctivae/corneas clear   Neck: trachea midline and symmetric, no adenopathy    Thyroid:  thyroid enlarged and nodular   Lung: clear to auscultation bilaterally   Heart:  regular rate and rhythm   Abdomen: soft, non-tender; bowel sounds normal; no masses,  no organomegaly   Skin/Extremities: warm and " well-perfused   Pulses: 2+ and symmetric   Neuro: normal without focal findings and mental status, speech normal, alert and oriented x3       Laboratory/Radiologic Studies    Studies:  Date:       Results:     DEXA:   1/18/2016 Spine T Score: 0.2, Hip T Score: -0.5,   Femoral neck T Score: -1.1.   Ultrasound:  7/13/2018 The right lobe of the thyroid measures 4.6 x 2.0 x 1.4 cm in the left lobe of the thyroid measures 4.3 x 2.1 x 1.6 cm.    There is a 2 cm nodule seen within the right lobe of the thyroid that is approximately 50% cystic.  The remainder of the thyroid nodules are less than 1.5 cm and not show evidence of microcalcification.      Impression       There are no thyroid nodules meeting criteria for FNA recommendation.        PET C T: 4/27/2018 FINDINGS:  Comparison 08/25/2015.  The patient was administered 12.42 millicuries of FDG intravenously.  There is physiologic intracranial activity.  Right tonsillar activity is asymmetric but no definite mass is seen and this is probably physiologic.  Again seen is a right thyroid nodule SUV max 8.44.  There is a left thyroid nodule SUV max 5.08.  This was seen before.  There is physiologic liver, spleen, and marrow activity.  Heart and mediastinum are unremarkable.  Marrow activity is low-grade within normal limits.  There are multiple bone lesions but these are in the treated healed phase.  There is physiologic GI and  activity.  There is DJD and vascular calcifications.  There are inflammatory degenerative areas of facet activity involving the C-spine, the T-spine, the lumbar spine.  There is degenerative activity of the left SI joint.  There are coronary artery calcifications.  There is a left-sided SVC.  There is a right renal cyst.         Component Value Date   TSH 1.210 06/13/2018   Free T4 1.08 08/14/2008   Vit D, 25-Hydroxy 24.5 (L) 07/17/2018   Sodium 139 07/19/2018   Potassium 4.0 07/19/2018   Chloride 107 07/19/2018   CO2 25 07/19/2018   Glucose 90  07/19/2018   BUN, Bld 16 07/19/2018   Creatinine 1.5 (H) 07/19/2018   Calcium 9.1 07/19/2018   Anion Gap 7 (L) 07/19/2018   eGFR if  54.9 (A) 07/19/2018   eGFR if non  47.5 (A) 07/19/2018       ASSESSMENT/PLAN:       Assessment    Mr. Galarza is a 65 y.o. male who presents with evidence of Thyroid nodule [E04.1] x2 which are PET positive.    Plan    During this visit, lab and imaging results were reviewed with the patient. Thyroid anatomy and physiology were reviewed and he was given a copy of our patient education booklet, Understanding Thyroid Disease. The above testing and examination support the diagnosis of PET positive thyroid nodules.     Thyroid surgery is recommended. Potential complications of this operation were reviewed with the patient.   I was able to re-review this case with the patient's endocrinologist.  Will plan bilateral FNA as this has the potential to change the extent of surgery.         97.8

## 2020-10-06 NOTE — CHART NOTE - NSCHARTNOTEFT_GEN_A_CORE
EUS FNB  Indication: recurrent ideopathic pancreatitis  Findings: PD 1.5 mm in the tail and body and 2mm in the head. the CBD measured 3.2 in the head  A fullness in the head of the pancreas was identified. Given the significant family history of pancreatic cancer and his personal history of ideopathic recurrent acute pancreatitis, FNB was performed. Using a 22 G FNB needle the pancreatic neck was sampled in 2 passes under doppler to avoid vascular interference. The PD was avoided as well. Rapid onsite evaluation of tissue adequacy with a pathologist onsite was performed.  From the duodenal bulb a 2.3x2.7 septated simple cyst was biopsied. No fluid was aspirated. however tissue was sampled for cytology.  Pt received cipro and Indocin during the procedure    Rec:  Monitor for pancreatitis (Pain)  2L of LR  Pain control  Cipro 500 BID x 4more days starting tomorrow  Follow up pathology  Follow up with Dr. Malik upon MN

## 2020-10-06 NOTE — PROGRESS NOTE ADULT - SUBJECTIVE AND OBJECTIVE BOX
GALDINOJEFF  55y  Male  HPI:  54 yo male with recurrent pancreatitis and was diagnosed w/ idiopathic pancreatitis (last discharged Sept 23) who presented to ED with worsening epigastric pain. Pt started having epigastric pain since ~9AM today. Pain is severe. No aggravating or alleviating factors. No fever. No vomiting. No chest pain. No SOB. No bm changes. Pt states since his discharge, his abdominal pain has improved until today. States he been on low fat diet.     Of note, pt was supposed to f/u GI for outpatient MRCP. Scheduled but not done.    Vital Signs Last 24 Hrs  T(C): 36.9 (01 Oct 2020 16:09), Max: 37.1 (01 Oct 2020 10:43)  T(F): 98.5 (01 Oct 2020 16:09), Max: 98.7 (01 Oct 2020 10:43)  HR: 83 (01 Oct 2020 16:09) (75 - 83)  BP: 117/74 (01 Oct 2020 16:09) (117/74 - 142/76)  BP(mean): 90 (01 Oct 2020 16:09) (90 - 90)  RR: 20 (01 Oct 2020 16:09) (20 - 20)  SpO2: 99% (01 Oct 2020 16:09) (98% - 99%)    In ED, pt found to have lipase > 3000. Pt admitted for pancreatitis.          (01 Oct 2020 18:04)    MEDICATIONS  (STANDING):  folic acid 1 milliGRAM(s) Oral daily  influenza   Vaccine 0.5 milliLiter(s) IntraMuscular once  multivitamin 1 Tablet(s) Oral daily  polyethylene glycol 3350 17 Gram(s) Oral at bedtime  senna 2 Tablet(s) Oral at bedtime  thiamine 100 milliGRAM(s) Oral daily    MEDICATIONS  (PRN):  acetaminophen   Tablet .. 650 milliGRAM(s) Oral every 6 hours PRN Mild Pain (1 - 3)    INTERVAL EVENTS: Patient seen today, NPO for EUS which was postponed yesterday    T(C): 36.4 (10-06-20 @ 07:21), Max: 36.4 (10-06-20 @ 05:07)  HR: 65 (10-06-20 @ 07:21) (65 - 76)  BP: 111/65 (10-06-20 @ 07:21) (111/65 - 118/55)  RR: 18 (10-06-20 @ 07:21) (18 - 18)  SpO2: 96% (10-06-20 @ 13:14) (96% - 97%)  Wt(kg): --Vital Signs Last 24 Hrs  T(C): 36.4 (06 Oct 2020 07:21), Max: 36.4 (06 Oct 2020 05:07)  T(F): 97.5 (06 Oct 2020 07:21), Max: 97.5 (06 Oct 2020 05:07)  HR: 65 (06 Oct 2020 07:21) (65 - 76)  BP: 111/65 (06 Oct 2020 07:21) (111/65 - 118/55)  BP(mean): --  RR: 18 (06 Oct 2020 07:21) (18 - 18)  SpO2: 96% (06 Oct 2020 13:14) (96% - 97%)    PHYSICAL EXAM:  GENERAL: NAD  NECK: Supple, No JVD  CHEST/LUNG: Clear  HEART: S1, S2, Regular rate and rhythm;   ABDOMEN: Soft, Nontender, Nondistended; Bowel sounds present  EXTREMITIES: No edema      LABS:                        13.7   9.43  )-----------( 314      ( 06 Oct 2020 05:23 )             41.5             10-06    143  |  103  |  20  ----------------------------<  89  4.7   |  30  |  0.9    Ca    9.4      06 Oct 2020 05:23    TPro  6.3  /  Alb  3.9  /  TBili  0.4  /  DBili  x   /  AST  39  /  ALT  29  /  AlkPhos  53  10-06    LIVER FUNCTIONS - ( 06 Oct 2020 05:23 )  Alb: 3.9 g/dL / Pro: 6.3 g/dL / ALK PHOS: 53 U/L / ALT: 29 U/L / AST: 39 U/L / GGT: x                         RADIOLOGY & ADDITIONAL TESTS:

## 2020-10-07 LAB
ALBUMIN SERPL ELPH-MCNC: 3.6 G/DL — SIGNIFICANT CHANGE UP (ref 3.5–5.2)
ALP SERPL-CCNC: 54 U/L — SIGNIFICANT CHANGE UP (ref 30–115)
ALT FLD-CCNC: 24 U/L — SIGNIFICANT CHANGE UP (ref 0–41)
ANION GAP SERPL CALC-SCNC: 11 MMOL/L — SIGNIFICANT CHANGE UP (ref 7–14)
AST SERPL-CCNC: 25 U/L — SIGNIFICANT CHANGE UP (ref 0–41)
BILIRUB SERPL-MCNC: 0.6 MG/DL — SIGNIFICANT CHANGE UP (ref 0.2–1.2)
BUN SERPL-MCNC: 18 MG/DL — SIGNIFICANT CHANGE UP (ref 10–20)
CALCIUM SERPL-MCNC: 8.7 MG/DL — SIGNIFICANT CHANGE UP (ref 8.5–10.1)
CHLORIDE SERPL-SCNC: 102 MMOL/L — SIGNIFICANT CHANGE UP (ref 98–110)
CO2 SERPL-SCNC: 26 MMOL/L — SIGNIFICANT CHANGE UP (ref 17–32)
CREAT SERPL-MCNC: 0.7 MG/DL — SIGNIFICANT CHANGE UP (ref 0.7–1.5)
CULTURE RESULTS: SIGNIFICANT CHANGE UP
GLUCOSE SERPL-MCNC: 98 MG/DL — SIGNIFICANT CHANGE UP (ref 70–99)
HCT VFR BLD CALC: 41.9 % — LOW (ref 42–52)
HGB BLD-MCNC: 13.8 G/DL — LOW (ref 14–18)
LIDOCAIN IGE QN: 1180 U/L — HIGH (ref 7–60)
MCHC RBC-ENTMCNC: 29.9 PG — SIGNIFICANT CHANGE UP (ref 27–31)
MCHC RBC-ENTMCNC: 32.9 G/DL — SIGNIFICANT CHANGE UP (ref 32–37)
MCV RBC AUTO: 90.9 FL — SIGNIFICANT CHANGE UP (ref 80–94)
NRBC # BLD: 0 /100 WBCS — SIGNIFICANT CHANGE UP (ref 0–0)
PLATELET # BLD AUTO: 268 K/UL — SIGNIFICANT CHANGE UP (ref 130–400)
POTASSIUM SERPL-MCNC: 3.8 MMOL/L — SIGNIFICANT CHANGE UP (ref 3.5–5)
POTASSIUM SERPL-SCNC: 3.8 MMOL/L — SIGNIFICANT CHANGE UP (ref 3.5–5)
PROT SERPL-MCNC: 5.9 G/DL — LOW (ref 6–8)
RBC # BLD: 4.61 M/UL — LOW (ref 4.7–6.1)
RBC # FLD: 12.5 % — SIGNIFICANT CHANGE UP (ref 11.5–14.5)
SODIUM SERPL-SCNC: 139 MMOL/L — SIGNIFICANT CHANGE UP (ref 135–146)
SPECIMEN SOURCE: SIGNIFICANT CHANGE UP
WBC # BLD: 8.72 K/UL — SIGNIFICANT CHANGE UP (ref 4.8–10.8)
WBC # FLD AUTO: 8.72 K/UL — SIGNIFICANT CHANGE UP (ref 4.8–10.8)

## 2020-10-07 RX ORDER — MORPHINE SULFATE 50 MG/1
4 CAPSULE, EXTENDED RELEASE ORAL ONCE
Refills: 0 | Status: DISCONTINUED | OUTPATIENT
Start: 2020-10-07 | End: 2020-10-07

## 2020-10-07 RX ORDER — MORPHINE SULFATE 50 MG/1
4 CAPSULE, EXTENDED RELEASE ORAL EVERY 6 HOURS
Refills: 0 | Status: DISCONTINUED | OUTPATIENT
Start: 2020-10-07 | End: 2020-10-08

## 2020-10-07 RX ORDER — SODIUM CHLORIDE 9 MG/ML
1000 INJECTION, SOLUTION INTRAVENOUS
Refills: 0 | Status: DISCONTINUED | OUTPATIENT
Start: 2020-10-07 | End: 2020-10-08

## 2020-10-07 RX ORDER — ENOXAPARIN SODIUM 100 MG/ML
40 INJECTION SUBCUTANEOUS DAILY
Refills: 0 | Status: DISCONTINUED | OUTPATIENT
Start: 2020-10-07 | End: 2020-10-08

## 2020-10-07 RX ORDER — MORPHINE SULFATE 50 MG/1
2 CAPSULE, EXTENDED RELEASE ORAL ONCE
Refills: 0 | Status: DISCONTINUED | OUTPATIENT
Start: 2020-10-07 | End: 2020-10-07

## 2020-10-07 RX ORDER — KETOROLAC TROMETHAMINE 30 MG/ML
30 SYRINGE (ML) INJECTION ONCE
Refills: 0 | Status: DISCONTINUED | OUTPATIENT
Start: 2020-10-07 | End: 2020-10-07

## 2020-10-07 RX ADMIN — MORPHINE SULFATE 4 MILLIGRAM(S): 50 CAPSULE, EXTENDED RELEASE ORAL at 10:00

## 2020-10-07 RX ADMIN — ENOXAPARIN SODIUM 40 MILLIGRAM(S): 100 INJECTION SUBCUTANEOUS at 17:03

## 2020-10-07 RX ADMIN — MORPHINE SULFATE 4 MILLIGRAM(S): 50 CAPSULE, EXTENDED RELEASE ORAL at 03:45

## 2020-10-07 RX ADMIN — MORPHINE SULFATE 2 MILLIGRAM(S): 50 CAPSULE, EXTENDED RELEASE ORAL at 06:12

## 2020-10-07 RX ADMIN — MORPHINE SULFATE 2 MILLIGRAM(S): 50 CAPSULE, EXTENDED RELEASE ORAL at 00:15

## 2020-10-07 RX ADMIN — Medication 200 MILLIGRAM(S): at 06:06

## 2020-10-07 RX ADMIN — SODIUM CHLORIDE 200 MILLILITER(S): 9 INJECTION, SOLUTION INTRAVENOUS at 22:44

## 2020-10-07 RX ADMIN — Medication 30 MILLIGRAM(S): at 02:26

## 2020-10-07 RX ADMIN — Medication 1 MILLIGRAM(S): at 11:48

## 2020-10-07 RX ADMIN — Medication 650 MILLIGRAM(S): at 17:37

## 2020-10-07 RX ADMIN — Medication 100 MILLIGRAM(S): at 11:48

## 2020-10-07 RX ADMIN — Medication 30 MILLIGRAM(S): at 03:00

## 2020-10-07 RX ADMIN — MORPHINE SULFATE 4 MILLIGRAM(S): 50 CAPSULE, EXTENDED RELEASE ORAL at 09:48

## 2020-10-07 RX ADMIN — Medication 1 TABLET(S): at 11:48

## 2020-10-07 RX ADMIN — SODIUM CHLORIDE 100 MILLILITER(S): 9 INJECTION, SOLUTION INTRAVENOUS at 06:05

## 2020-10-07 RX ADMIN — MORPHINE SULFATE 4 MILLIGRAM(S): 50 CAPSULE, EXTENDED RELEASE ORAL at 01:00

## 2020-10-07 RX ADMIN — Medication 200 MILLIGRAM(S): at 17:03

## 2020-10-07 RX ADMIN — MORPHINE SULFATE 4 MILLIGRAM(S): 50 CAPSULE, EXTENDED RELEASE ORAL at 03:02

## 2020-10-07 RX ADMIN — Medication 650 MILLIGRAM(S): at 17:07

## 2020-10-07 NOTE — PROGRESS NOTE ADULT - SUBJECTIVE AND OBJECTIVE BOX
Chart reviewed, patient examined. Pertinent results reviewed.  Case discussed with HO; specialist f/u reviewed  HD#6; Readmit 1 wk post DC  Had EUS yesterday-10/6    JEFF AHMADI    HPI:  54 yo male with recurrent pancreatitis and was diagnosed w/ idiopathic pancreatitis (last discharged Sept 23) who presented to ED with worsening epigastric pain. Pt started having epigastric pain since ~9AM the DOA. Pain was severe. No aggravating or alleviating factors. No fever. No vomiting. No chest pain. No SOB. No bm changes. Pt states since his discharge, his abdominal pain has improved until today. States he been on low fat diet.     Of note, pt was supposed to f/u GI for outpatient MRCP. Scheduled but not done.  FH: father had Pancreatitis (ETOH? as cause)    In ED, pt found to have lipase > 3000. Pt admitted for pancreatitis. It has trended down since; he is feeling better, and was fed a low fat diet by 10/4, then was NPO for procedures which he tolerated. Last PM his abdominal pains worsened, & he could not tolerate a small amount of clear liquids         (01 Oct 2020 18:04)  INTERVAL EVENTS: Patient seen today - in mild-mod pain;    MEDICATIONS  (STANDING):  ciprofloxacin   IVPB      ciprofloxacin   IVPB 400 milliGRAM(s) IV Intermittent every 12 hours  enoxaparin Injectable 40 milliGRAM(s) SubCutaneous daily  folic acid 1 milliGRAM(s) Oral daily  influenza   Vaccine 0.5 milliLiter(s) IntraMuscular once  lactated ringers. 1000 milliLiter(s) (200 mL/Hr) IV Continuous <Continuous>  multivitamin 1 Tablet(s) Oral daily  polyethylene glycol 3350 17 Gram(s) Oral at bedtime  senna 2 Tablet(s) Oral at bedtime  thiamine 100 milliGRAM(s) Oral daily    MEDICATIONS  (PRN):  acetaminophen   Tablet .. 650 milliGRAM(s) Oral every 6 hours PRN Mild Pain (1 - 3)  morphine  - Injectable 4 milliGRAM(s) IV Push every 6 hours PRN Severe Pain (7 - 10)    Vital Signs Last 24 Hrs  T(C): 35.9 (07 Oct 2020 05:29), Max: 36.4 (06 Oct 2020 14:15)  T(F): 96.7 (07 Oct 2020 05:29), Max: 97.5 (06 Oct 2020 14:15)  HR: 80 (07 Oct 2020 05:29) (60 - 82)  BP: 99/55 (07 Oct 2020 05:29) (99/55 - 146/80)  BP(mean): --  RR: 18 (07 Oct 2020 05:29) (18 - 18)  SpO2: 96% (07 Oct 2020 07:48) (96% - 100%)        PHYSICAL EXAM:  GENERAL: NAD; overweight  NECK: Supple, No JVD  CHEST/LUNG: Clear  HEART: RRR, no MRG  ABDOMEN: obese; min distended; + pain w cough; + TTP or percussion  EXTREMITIES: No edema    LABS:                              13.6   8.81  )-----------( 286      ( 05 Oct 2020 07:13 )             40.7                   13.7   7.76  )-----------( 289      ( 04 Oct 2020 06:42 )             41.4     10-04    143  |  104  |  10  ----------------------------<  89  5.2<H>   |  31  |  0.8    Ca    9.2      04 Oct 2020 06:42                10-04    143  |  104  |  10  ----------------------------<  89  5.2<H>   |  31  |  0.8    Ca    9.2      04 Oct 2020 06:42  Phos  3.5     10-03  Mg     2.0     10-03    TPro  6.3  /  Alb  3.7  /  TBili  0.5  /  DBili  x   /  AST  14  /  ALT  11  /  AlkPhos  58  10-03    LIVER FUNCTIONS - ( 03 Oct 2020 07:05 )  Alb: 3.7 g/dL / Pro: 6.3 g/dL / ALK PHOS: 58 U/L / ALT: 11 U/L / AST: 14 U/L / GGT: x           Culture - Blood (collected 02 Oct 2020 06:00)  Source: .Blood None  Preliminary Report (03 Oct 2020 19:01):    No growth to date.    l  RADIOLOGY & ADDITIONAL TESTS:

## 2020-10-07 NOTE — MEDICAL STUDENT PROGRESS NOTE(EDUCATION) - SUBJECTIVE AND OBJECTIVE BOX
----------Daily Progress Note----------    HISTORY OF PRESENT ILLNESS:  Patient is a 55y old Male who presents with a chief complaint of Epigastric pain (06 Oct 2020 17:03)    Currently admitted to medicine with the primary diagnosis of Recurrent pancreatitis       Today is hospital day 6d.     INTERVAL HOSPITAL COURSE / OVERNIGHT EVENTS:    Patient was seen and examined at the bedside. Patient reports that he did not sleep well last night and is feeling frustrated with his care. He reports that he was in a lot of pain following his procedure yesterday evening, believing it to be due to a flair up of his pancreatitis again. Overnight, he reports that his initial pain med doses and fluids were not helping, and when switched to Dilaudid, he had a negative reaction that resulted in several vomiting episodes. Currently, although patient denies being in any significant pain due to receiving a recent dose of his pain meds, patient is growing frustrated and is requesting an increase in fluid levels as it seems to be more helpful for his pain.       Review of Systems: Otherwise unremarkable     PAST MEDICAL & SURGICAL HISTORY:  Pancreatitis    ALLERGIES:  No Known Allergies    MEDICATIONS:  STANDING MEDICATIONS  ciprofloxacin   IVPB      ciprofloxacin   IVPB 400 milliGRAM(s) IV Intermittent every 12 hours  folic acid 1 milliGRAM(s) Oral daily  influenza   Vaccine 0.5 milliLiter(s) IntraMuscular once  lactated ringers. 1000 milliLiter(s) IV Continuous <Continuous>  multivitamin 1 Tablet(s) Oral daily  polyethylene glycol 3350 17 Gram(s) Oral at bedtime  senna 2 Tablet(s) Oral at bedtime  thiamine 100 milliGRAM(s) Oral daily    PRN MEDICATIONS  acetaminophen   Tablet .. 650 milliGRAM(s) Oral every 6 hours PRN  morphine  - Injectable 4 milliGRAM(s) IV Push every 6 hours PRN    VITALS:  T(F): 96.7  HR: 80  BP: 99/55  RR: 18  SpO2: 96%    PHYSICAL EXAM:  GENERAL: Well developed, well nourished and in no acute distress. Resting comfortably in bed.  HEENT: Normocephalic, mucous membranes moist, bilateral sclera anicteric, no conjunctival injection.  Neck: Supple, non-tender, no lymphadenopathy.  PULMONARY: Clear to auscultation bilaterally. No wheezing, rhonchi, or rales.  CARDIOVASCULAR: Regular rate and rhythm. S1-S2.  GASTROINTESTINAL: Soft, non-tender, non-distended, no guarding. Bowel sounds present.   SKIN/EXTREMITIES: No clubbing or edema.  NEUROLOGIC/MUSCULOSKELETAL: AOx3. No focal deficits.    LABS:                        13.7   9.43  )-----------( 314      ( 06 Oct 2020 05:23 )             41.5     10-06    143  |  103  |  20  ----------------------------<  89  4.7   |  30  |  0.9    Ca    9.4      06 Oct 2020 05:23    TPro  6.3  /  Alb  3.9  /  TBili  0.4  /  DBili  x   /  AST  39  /  ALT  29  /  AlkPhos  53  10-06      RADIOLOGY:    -RUQ US of Abdomen (01 Oct 2020)  --> No evidence of cholecystitis. Pancreas obscured by bowel gas.    -MRCP/MRI of Abdomen w/ and w/o contrast (02 Oct 2020) --> Acute interstitial edematous pancreatitis w/ no evidence of fluid collection. 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 Admitted for pancreatitis.    # Acute pancreatitis in the setting of recurrent pancreatitis  -Acutely severe abdominal pain w/ lipase >3000 (01 Oct 2020) -- Lipase trending down, 47 (05 Oct 2020)   -Associated with 2.2cm cystic lesion of pancreatic uncinate process communicating with main pancreatic duct  -Abdominal US of RUQ (01 Oct 2020) -->unremarkable for gall stone or cholecystitis  - MRCP findings (02 Oct 2020)-- Acute interstitial edematous pancreatitis w/ no evidence of fluid collection. 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    # Obesity  - diet counselling  - repeat lipid panel : no hypertriglyceridemia  - HBA1C: 5.9%    DVT ppx ; Lovenox  GI ppx : not needed  Ambulate as tolerated   Diet : low fat diet  Code Full  Dispo: EUS today  Anticipate D/C later today or tomorrow   ----------Daily Progress Note----------    HISTORY OF PRESENT ILLNESS:  Patient is a 55y old Male who presents with a chief complaint of Epigastric pain (06 Oct 2020 17:03)    Currently admitted to medicine with the primary diagnosis of Recurrent pancreatitis       Today is hospital day 6d.     INTERVAL HOSPITAL COURSE / OVERNIGHT EVENTS:    Patient was seen and examined at the bedside. Patient reports that he did not sleep well last night and is feeling frustrated with his care. He reports that he was in a lot of pain following his procedure yesterday evening, believing it to be due to a flair up of his pancreatitis again. Overnight, he reports that his initial pain med doses and fluids were not helping, and when switched to Dilaudid, he had a negative reaction that resulted in several vomiting episodes. Currently, although patient denies being in any significant pain due to receiving a recent dose of his pain meds, patient is growing frustrated and is requesting an increase in fluid levels as it seems to be more helpful for his pain.       Review of Systems: Otherwise unremarkable     PAST MEDICAL & SURGICAL HISTORY:  Pancreatitis    ALLERGIES:  No Known Allergies    MEDICATIONS:  STANDING MEDICATIONS  ciprofloxacin   IVPB      ciprofloxacin   IVPB 400 milliGRAM(s) IV Intermittent every 12 hours  folic acid 1 milliGRAM(s) Oral daily  influenza   Vaccine 0.5 milliLiter(s) IntraMuscular once  lactated ringers. 1000 milliLiter(s) IV Continuous <Continuous>  multivitamin 1 Tablet(s) Oral daily  polyethylene glycol 3350 17 Gram(s) Oral at bedtime  senna 2 Tablet(s) Oral at bedtime  thiamine 100 milliGRAM(s) Oral daily    PRN MEDICATIONS  acetaminophen   Tablet .. 650 milliGRAM(s) Oral every 6 hours PRN  morphine  - Injectable 4 milliGRAM(s) IV Push every 6 hours PRN    VITALS:  T(F): 96.7  HR: 80  BP: 99/55  RR: 18  SpO2: 96%    PHYSICAL EXAM:  GENERAL: Well developed, well nourished and in no acute distress. Resting comfortably in bed.  HEENT: Normocephalic, mucous membranes moist, bilateral sclera anicteric, no conjunctival injection.  Neck: Supple, non-tender, no lymphadenopathy.  PULMONARY: Clear to auscultation bilaterally. No wheezing, rhonchi, or rales.  CARDIOVASCULAR: Regular rate and rhythm. S1-S2.  GASTROINTESTINAL: Soft, non-tender, non-distended, no guarding. Bowel sounds present.   SKIN/EXTREMITIES: No clubbing or edema.  NEUROLOGIC/MUSCULOSKELETAL: AOx3. No focal deficits.    LABS:                        13.7   9.43  )-----------( 314      ( 06 Oct 2020 05:23 )             41.5     10-06    143  |  103  |  20  ----------------------------<  89  4.7   |  30  |  0.9    Ca    9.4      06 Oct 2020 05:23    TPro  6.3  /  Alb  3.9  /  TBili  0.4  /  DBili  x   /  AST  39  /  ALT  29  /  AlkPhos  53  10-06      RADIOLOGY:    -RUQ US of Abdomen (01 Oct 2020)  --> No evidence of cholecystitis. Pancreas obscured by bowel gas.    -MRCP/MRI of Abdomen w/ and w/o contrast (02 Oct 2020) --> Acute interstitial edematous pancreatitis w/ no evidence of fluid collection. 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 PD 1.5 mm in the tail and body and 2mm in the head. CBD 3.2 in the head.       ASSESSMENT & PLAN:    · Assessment	  55 year old male with hx of HLD, former tobacco use, quit within the past year, who presented to ED with worsening epigastric pain. Admitted for pancreatitis.    # Acute pancreatitis in the setting of recurrent pancreatitis  -Acutely severe abdominal pain w/ lipase >3000 (01 Oct 2020) -- Lipase trending down, 47 (05 Oct 2020)   -Associated with 2.2cm cystic lesion of pancreatic uncinate process communicating with main pancreatic duct  -Abdominal US of RUQ (01 Oct 2020) -->unremarkable for gall stone or cholecystitis  - MRCP findings (02 Oct 2020)-- Acute interstitial edematous pancreatitis w/ no evidence of fluid collection. 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 F/U with path.   - IgG4 negative for autoimmune etiology  - Obtain genetic mutation panel for PRSS, CFTR, SPINK, Ca19-9 per     # Obesity  - diet counselling  - repeat lipid panel : no hypertriglyceridemia  - HBA1C: 5.9%    DVT ppx ; Lovenox  GI ppx : not needed  Ambulate as tolerated   Diet : NPO for now.   Code Full  Dispo:   Anticipate D/C    ----------Daily Progress Note----------    HISTORY OF PRESENT ILLNESS:  Patient is a 55y old Male who presents with a chief complaint of Epigastric pain (06 Oct 2020 17:03)    Currently admitted to medicine with the primary diagnosis of Recurrent pancreatitis       Today is hospital day 6d.     INTERVAL HOSPITAL COURSE / OVERNIGHT EVENTS:    Patient was seen and examined at the bedside. Patient reports that he did not sleep well last night and is feeling frustrated with his care. He reports that he was in a lot of pain following his procedure yesterday evening, believing it to be due to a flair up of his pancreatitis again. Overnight, he reports that his initial pain med doses and fluids were not helping, and when switched to Dilaudid, he had a negative reaction that resulted in several vomiting episodes. Currently, although patient denies being in any significant pain due to receiving a recent dose of his pain meds, patient is growing frustrated and is requesting an increase in fluid levels as it seems to be more helpful for his pain.       Review of Systems: Otherwise unremarkable     PAST MEDICAL & SURGICAL HISTORY:  Pancreatitis    ALLERGIES:  No Known Allergies    MEDICATIONS:  STANDING MEDICATIONS  ciprofloxacin   IVPB      ciprofloxacin   IVPB 400 milliGRAM(s) IV Intermittent every 12 hours  folic acid 1 milliGRAM(s) Oral daily  influenza   Vaccine 0.5 milliLiter(s) IntraMuscular once  lactated ringers. 1000 milliLiter(s) IV Continuous <Continuous>  multivitamin 1 Tablet(s) Oral daily  polyethylene glycol 3350 17 Gram(s) Oral at bedtime  senna 2 Tablet(s) Oral at bedtime  thiamine 100 milliGRAM(s) Oral daily    PRN MEDICATIONS  acetaminophen   Tablet .. 650 milliGRAM(s) Oral every 6 hours PRN  morphine  - Injectable 4 milliGRAM(s) IV Push every 6 hours PRN    VITALS:  T(F): 96.7  HR: 80  BP: 99/55  RR: 18  SpO2: 96%    PHYSICAL EXAM:  GENERAL: Well developed, well nourished and in no acute distress. Resting comfortably in bed.  HEENT: Normocephalic, mucous membranes moist, bilateral sclera anicteric, no conjunctival injection.  Neck: Supple, non-tender, no lymphadenopathy.  PULMONARY: Clear to auscultation bilaterally. No wheezing, rhonchi, or rales.  CARDIOVASCULAR: Regular rate and rhythm. S1-S2.  GASTROINTESTINAL: Soft, non-tender, non-distended, no guarding. Bowel sounds present.   SKIN/EXTREMITIES: No clubbing or edema.  NEUROLOGIC/MUSCULOSKELETAL: AOx3. No focal deficits.    LABS:                        13.7   9.43  )-----------( 314      ( 06 Oct 2020 05:23 )             41.5     10-06    143  |  103  |  20  ----------------------------<  89  4.7   |  30  |  0.9    Ca    9.4      06 Oct 2020 05:23    TPro  6.3  /  Alb  3.9  /  TBili  0.4  /  DBili  x   /  AST  39  /  ALT  29  /  AlkPhos  53  10-06      RADIOLOGY:    -RUQ US of Abdomen (01 Oct 2020)  --> No evidence of cholecystitis. Pancreas obscured by bowel gas.    -MRCP/MRI of Abdomen w/ and w/o contrast (02 Oct 2020) --> Acute interstitial edematous pancreatitis w/ no evidence of fluid collection. 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 PD 1.5 mm in the tail and body and 2mm in the head. CBD 3.2 in the head.       ASSESSMENT & PLAN:    · Assessment	  55 year old male with hx of HLD, former tobacco use, quit within the past year, who presented to ED with worsening epigastric pain. Admitted for pancreatitis.    # Acute pancreatitis in the setting of recurrent pancreatitis  -Acutely severe abdominal pain w/ lipase >3000 (01 Oct 2020) -- Lipase trending down, 47 (05 Oct 2020)   -Associated with 2.2cm cystic lesion of pancreatic uncinate process communicating with main pancreatic duct  -Abdominal US of RUQ (01 Oct 2020) -->unremarkable for gall stone or cholecystitis  - MRCP findings (02 Oct 2020)-- Acute interstitial edematous pancreatitis w/ no evidence of fluid collection. 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 F/U with path. Pt treated with Indocin and Cipro. Currently on 400 IV Cipro 400mg and 200mL IV fluid. NPO to clear fluids for now.  - IgG4 negative for autoimmune etiology  - Obtain genetic mutation panel for PRSS, CFTR, SPINK, Ca19-9 per     # Obesity  - diet counselling  - repeat lipid panel : no hypertriglyceridemia  - HBA1C: 5.9%    DVT ppx ; Lovenox  GI ppx : not needed  Ambulate as tolerated   Diet : Tolerated low fat diet prior to procedure. NPO for now. Advance gradually.  Code Full  Dispo:   Anticipate D/C    ----------Daily Progress Note----------    HISTORY OF PRESENT ILLNESS:  Patient is a 55y old Male who presents with a chief complaint of Epigastric pain (06 Oct 2020 17:03)    Currently admitted to medicine with the primary diagnosis of Recurrent pancreatitis       Today is hospital day 6d.     INTERVAL HOSPITAL COURSE / OVERNIGHT EVENTS:    Patient was seen and examined at the bedside. Patient reports that he did not sleep well last night and is feeling frustrated with his care. He reports that he was in a lot of pain following his procedure yesterday evening, believing it to be due to a flair up of his pancreatitis again. Overnight, he reports that his initial pain med doses and fluids were not helping, and when switched to Dilaudid, he had a negative reaction that resulted in several vomiting episodes. Currently, although patient denies being in any significant pain due to receiving a recent dose of his pain meds, patient is growing frustrated and is requesting an increase in fluid levels as it seems to be more helpful for his pain.       Review of Systems: Otherwise unremarkable     PAST MEDICAL & SURGICAL HISTORY:  Pancreatitis    ALLERGIES:  No Known Allergies    MEDICATIONS:  STANDING MEDICATIONS  ciprofloxacin   IVPB      ciprofloxacin   IVPB 400 milliGRAM(s) IV Intermittent every 12 hours  folic acid 1 milliGRAM(s) Oral daily  influenza   Vaccine 0.5 milliLiter(s) IntraMuscular once  lactated ringers. 1000 milliLiter(s) IV Continuous <Continuous>  multivitamin 1 Tablet(s) Oral daily  polyethylene glycol 3350 17 Gram(s) Oral at bedtime  senna 2 Tablet(s) Oral at bedtime  thiamine 100 milliGRAM(s) Oral daily    PRN MEDICATIONS  acetaminophen   Tablet .. 650 milliGRAM(s) Oral every 6 hours PRN  morphine  - Injectable 4 milliGRAM(s) IV Push every 6 hours PRN    VITALS:  T(F): 96.7  HR: 80  BP: 99/55  RR: 18  SpO2: 96%    PHYSICAL EXAM:  GENERAL: Well developed, well nourished and in no acute distress. Resting comfortably in bed.  HEENT: Normocephalic, mucous membranes moist, bilateral sclera anicteric, no conjunctival injection.  Neck: Supple, non-tender, no lymphadenopathy.  PULMONARY: Clear to auscultation bilaterally. No wheezing, rhonchi, or rales.  CARDIOVASCULAR: Regular rate and rhythm. S1-S2.  GASTROINTESTINAL: Soft, non-tender, non-distended, no guarding. Bowel sounds present.   SKIN/EXTREMITIES: No clubbing or edema.  NEUROLOGIC/MUSCULOSKELETAL: AOx3. No focal deficits.    LABS:                        13.7   9.43  )-----------( 314      ( 06 Oct 2020 05:23 )             41.5     10-06    143  |  103  |  20  ----------------------------<  89  4.7   |  30  |  0.9    Ca    9.4      06 Oct 2020 05:23    TPro  6.3  /  Alb  3.9  /  TBili  0.4  /  DBili  x   /  AST  39  /  ALT  29  /  AlkPhos  53  10-06      RADIOLOGY:    -RUQ US of Abdomen (01 Oct 2020)  --> No evidence of cholecystitis. Pancreas obscured by bowel gas.    -MRCP/MRI of Abdomen w/ and w/o contrast (02 Oct 2020) --> Acute interstitial edematous pancreatitis w/ no evidence of fluid collection. 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 PD 1.5 mm in the tail and body and 2mm in the head. CBD 3.2 in the head.       ASSESSMENT & PLAN:    · Assessment	  55 year old male with hx of HLD, former tobacco use, quit within the past year, who presented to ED with worsening epigastric pain. Admitted for pancreatitis.    # Acute pancreatitis in the setting of recurrent pancreatitis  -Acutely severe abdominal pain w/ lipase >3000 (01 Oct 2020)  -Associated with 2.2cm cystic lesion of pancreatic uncinate process communicating with main pancreatic duct  -Abdominal US of RUQ (01 Oct 2020) -->unremarkable for gall stone or cholecystitis  -IgG4 negative for autoimmune etiology  -MRCP findings (02 Oct 2020)-- Acute interstitial edematous pancreatitis w/ no evidence of fluid collection. Pancreatic side branch Intraductal Papillary Mucinous Neoplasm (IPMN)  -s/p EUS with FNB needle (06 Oct 2020) --> 2.3 x 2.7 septated simple cyst. Likely complicated with pancreatitis given the history of severe epigastric pain after procedure and lipase of 1500  - Currently on 400 IV Cipro 400mg and 200mL IV fluid.   - NPO to clear fluids for now & Iv hydration at a rate of 200 cc/h        # Obesity  - diet counselling  - repeat lipid panel : no hypertriglyceridemia  - HBA1C: 5.9%    DVT ppx ; Lovenox  GI ppx : not needed  Ambulate as tolerated   Diet : Clear liquid  Code Full

## 2020-10-07 NOTE — PROGRESS NOTE ADULT - ASSESSMENT
55 years old male with history of HLD, former tobacco use, quit within the past year, who presented to ED with worsening epigastric pain. Admitted for pancreatitis.    Acute, recurrent pancreatitis - of unknown etiology  - lipase trended down; repeat for today pending;   - IgG4 for autoimmune pancreatitis pending;     GI also recommends a genetic panel-  ordered  - MRCP noted  - tolerating advance diet prior to procedure; Pain & Nausea again now      back to NPO to clear fluids; advance slowly  - GI and Nutrition notes appreciated  - supplements ordered  - DC planning after procedure with GI to clear    Obesity  - diet counselling  - patient had been on diet since last hospital stay  - weight reduction needed      DVT ppx ; Lovenox  GI ppx : not needed  Ambulate as tolerated   Diet : Low fat/cholesterol diet, NPO after midnight  Code Full

## 2020-10-08 ENCOUNTER — TRANSCRIPTION ENCOUNTER (OUTPATIENT)
Age: 55
End: 2020-10-08

## 2020-10-08 VITALS — SYSTOLIC BLOOD PRESSURE: 126 MMHG | HEART RATE: 79 BPM | DIASTOLIC BLOOD PRESSURE: 67 MMHG | RESPIRATION RATE: 18 BRPM

## 2020-10-08 LAB
ALBUMIN SERPL ELPH-MCNC: 3.2 G/DL — LOW (ref 3.5–5.2)
ALP SERPL-CCNC: 51 U/L — SIGNIFICANT CHANGE UP (ref 30–115)
ALT FLD-CCNC: 21 U/L — SIGNIFICANT CHANGE UP (ref 0–41)
ANION GAP SERPL CALC-SCNC: 7 MMOL/L — SIGNIFICANT CHANGE UP (ref 7–14)
AST SERPL-CCNC: 23 U/L — SIGNIFICANT CHANGE UP (ref 0–41)
BILIRUB SERPL-MCNC: 0.4 MG/DL — SIGNIFICANT CHANGE UP (ref 0.2–1.2)
BUN SERPL-MCNC: 9 MG/DL — LOW (ref 10–20)
CALCIUM SERPL-MCNC: 8.3 MG/DL — LOW (ref 8.5–10.1)
CHLORIDE SERPL-SCNC: 103 MMOL/L — SIGNIFICANT CHANGE UP (ref 98–110)
CO2 SERPL-SCNC: 27 MMOL/L — SIGNIFICANT CHANGE UP (ref 17–32)
CREAT SERPL-MCNC: 0.7 MG/DL — SIGNIFICANT CHANGE UP (ref 0.7–1.5)
GLUCOSE SERPL-MCNC: 90 MG/DL — SIGNIFICANT CHANGE UP (ref 70–99)
HCT VFR BLD CALC: 36.2 % — LOW (ref 42–52)
HGB BLD-MCNC: 12.1 G/DL — LOW (ref 14–18)
LIDOCAIN IGE QN: 194 U/L — HIGH (ref 7–60)
MCHC RBC-ENTMCNC: 29.8 PG — SIGNIFICANT CHANGE UP (ref 27–31)
MCHC RBC-ENTMCNC: 33.4 G/DL — SIGNIFICANT CHANGE UP (ref 32–37)
MCV RBC AUTO: 89.2 FL — SIGNIFICANT CHANGE UP (ref 80–94)
NON-GYNECOLOGICAL CYTOLOGY STUDY: SIGNIFICANT CHANGE UP
NON-GYNECOLOGICAL CYTOLOGY STUDY: SIGNIFICANT CHANGE UP
NRBC # BLD: 0 /100 WBCS — SIGNIFICANT CHANGE UP (ref 0–0)
PLATELET # BLD AUTO: 223 K/UL — SIGNIFICANT CHANGE UP (ref 130–400)
POTASSIUM SERPL-MCNC: 3.7 MMOL/L — SIGNIFICANT CHANGE UP (ref 3.5–5)
POTASSIUM SERPL-SCNC: 3.7 MMOL/L — SIGNIFICANT CHANGE UP (ref 3.5–5)
PROT SERPL-MCNC: 5.1 G/DL — LOW (ref 6–8)
RBC # BLD: 4.06 M/UL — LOW (ref 4.7–6.1)
RBC # FLD: 12.5 % — SIGNIFICANT CHANGE UP (ref 11.5–14.5)
SODIUM SERPL-SCNC: 137 MMOL/L — SIGNIFICANT CHANGE UP (ref 135–146)
SURGICAL PATHOLOGY STUDY: SIGNIFICANT CHANGE UP
WBC # BLD: 4.78 K/UL — LOW (ref 4.8–10.8)
WBC # FLD AUTO: 4.78 K/UL — LOW (ref 4.8–10.8)

## 2020-10-08 RX ORDER — ERGOCALCIFEROL 1.25 MG/1
50000 CAPSULE ORAL DAILY
Refills: 0 | Status: DISCONTINUED | OUTPATIENT
Start: 2020-10-08 | End: 2020-10-08

## 2020-10-08 RX ADMIN — SODIUM CHLORIDE 200 MILLILITER(S): 9 INJECTION, SOLUTION INTRAVENOUS at 03:42

## 2020-10-08 RX ADMIN — Medication 1 MILLIGRAM(S): at 11:24

## 2020-10-08 RX ADMIN — Medication 1 TABLET(S): at 11:25

## 2020-10-08 RX ADMIN — Medication 200 MILLIGRAM(S): at 06:30

## 2020-10-08 RX ADMIN — Medication 100 MILLIGRAM(S): at 11:24

## 2020-10-08 RX ADMIN — ENOXAPARIN SODIUM 40 MILLIGRAM(S): 100 INJECTION SUBCUTANEOUS at 11:25

## 2020-10-08 NOTE — PROGRESS NOTE ADULT - SUBJECTIVE AND OBJECTIVE BOX
GALDINOJEFF  55y  Male  HPI:  54 yo male with recurrent pancreatitis and was diagnosed w/ idiopathic pancreatitis (last discharged Sept 23) who presented to ED with worsening epigastric pain. Pt started having epigastric pain since ~9AM today. Pain is severe. No aggravating or alleviating factors. No fever. No vomiting. No chest pain. No SOB. No bm changes. Pt states since his discharge, his abdominal pain has improved until today. States he been on low fat diet.     Of note, pt was supposed to f/u GI for outpatient MRCP. Scheduled but not done.    Vital Signs Last 24 Hrs  T(C): 36.9 (01 Oct 2020 16:09), Max: 37.1 (01 Oct 2020 10:43)  T(F): 98.5 (01 Oct 2020 16:09), Max: 98.7 (01 Oct 2020 10:43)  HR: 83 (01 Oct 2020 16:09) (75 - 83)  BP: 117/74 (01 Oct 2020 16:09) (117/74 - 142/76)  BP(mean): 90 (01 Oct 2020 16:09) (90 - 90)  RR: 20 (01 Oct 2020 16:09) (20 - 20)  SpO2: 99% (01 Oct 2020 16:09) (98% - 99%)    In ED, pt found to have lipase > 3000. Pt admitted for pancreatitis.          (01 Oct 2020 18:04)    MEDICATIONS  (STANDING):  ciprofloxacin   IVPB      ciprofloxacin   IVPB 400 milliGRAM(s) IV Intermittent every 12 hours  enoxaparin Injectable 40 milliGRAM(s) SubCutaneous daily  folic acid 1 milliGRAM(s) Oral daily  influenza   Vaccine 0.5 milliLiter(s) IntraMuscular once  multivitamin 1 Tablet(s) Oral daily  polyethylene glycol 3350 17 Gram(s) Oral at bedtime  senna 2 Tablet(s) Oral at bedtime  thiamine 100 milliGRAM(s) Oral daily    MEDICATIONS  (PRN):  acetaminophen   Tablet .. 650 milliGRAM(s) Oral every 6 hours PRN Mild Pain (1 - 3)    INTERVAL EVENTS: Patient seen today without distress, feels better, pain tolerable today.     T(C): 37.2 (10-08-20 @ 05:00), Max: 37.8 (10-07-20 @ 17:15)  HR: 80 (10-08-20 @ 05:00) (80 - 86)  BP: 100/58 (10-08-20 @ 05:00) (94/51 - 107/64)  RR: 18 (10-08-20 @ 05:00) (18 - 18)  SpO2: 96% (10-08-20 @ 07:29) (96% - 96%)  Wt(kg): --Vital Signs Last 24 Hrs  T(C): 37.2 (08 Oct 2020 05:00), Max: 37.8 (07 Oct 2020 17:15)  T(F): 99 (08 Oct 2020 05:00), Max: 100.1 (07 Oct 2020 17:15)  HR: 80 (08 Oct 2020 05:00) (80 - 86)  BP: 100/58 (08 Oct 2020 05:00) (94/51 - 107/64)  BP(mean): --  RR: 18 (08 Oct 2020 05:00) (18 - 18)  SpO2: 96% (08 Oct 2020 07:29) (96% - 96%)    PHYSICAL EXAM:  GENERAL: NAD  NECK: Supple, No JVD  CHEST/LUNG: Clear  HEART: S1, S2, Regular rate and rhythm  ABDOMEN: Soft, Nontender, Bowel sounds present  EXTREMITIES: No edema    LABS:    JEFF AHMADI  55y  Male  HPI:  54 yo male with recurrent pancreatitis and was diagnosed w/ idiopathic pancreatitis (last discharged Sept 23) who presented to ED with worsening epigastric pain. Pt started having epigastric pain since ~9AM today. Pain is severe. No aggravating or alleviating factors. No fever. No vomiting. No chest pain. No SOB. No bm changes. Pt states since his discharge, his abdominal pain has improved until today. States he been on low fat diet.     Of note, pt was supposed to f/u GI for outpatient MRCP. Scheduled but not done.    Vital Signs Last 24 Hrs  T(C): 36.9 (01 Oct 2020 16:09), Max: 37.1 (01 Oct 2020 10:43)  T(F): 98.5 (01 Oct 2020 16:09), Max: 98.7 (01 Oct 2020 10:43)  HR: 83 (01 Oct 2020 16:09) (75 - 83)  BP: 117/74 (01 Oct 2020 16:09) (117/74 - 142/76)  BP(mean): 90 (01 Oct 2020 16:09) (90 - 90)  RR: 20 (01 Oct 2020 16:09) (20 - 20)  SpO2: 99% (01 Oct 2020 16:09) (98% - 99%)    In ED, pt found to have lipase > 3000. Pt admitted for pancreatitis.          (01 Oct 2020 18:04)    MEDICATIONS  (STANDING):  ciprofloxacin   IVPB      ciprofloxacin   IVPB 400 milliGRAM(s) IV Intermittent every 12 hours  enoxaparin Injectable 40 milliGRAM(s) SubCutaneous daily  folic acid 1 milliGRAM(s) Oral daily  influenza   Vaccine 0.5 milliLiter(s) IntraMuscular once  multivitamin 1 Tablet(s) Oral daily  polyethylene glycol 3350 17 Gram(s) Oral at bedtime  senna 2 Tablet(s) Oral at bedtime  thiamine 100 milliGRAM(s) Oral daily    MEDICATIONS  (PRN):  acetaminophen   Tablet .. 650 milliGRAM(s) Oral every 6 hours PRN Mild Pain (1 - 3)    INTERVAL EVENTS:    T(C): 37.2 (10-08-20 @ 05:00), Max: 37.8 (10-07-20 @ 17:15)  HR: 80 (10-08-20 @ 05:00) (80 - 86)  BP: 100/58 (10-08-20 @ 05:00) (94/51 - 107/64)  RR: 18 (10-08-20 @ 05:00) (18 - 18)  SpO2: 96% (10-08-20 @ 07:29) (96% - 96%)  Wt(kg): --Vital Signs Last 24 Hrs  T(C): 37.2 (08 Oct 2020 05:00), Max: 37.8 (07 Oct 2020 17:15)  T(F): 99 (08 Oct 2020 05:00), Max: 100.1 (07 Oct 2020 17:15)  HR: 80 (08 Oct 2020 05:00) (80 - 86)  BP: 100/58 (08 Oct 2020 05:00) (94/51 - 107/64)  BP(mean): --  RR: 18 (08 Oct 2020 05:00) (18 - 18)  SpO2: 96% (08 Oct 2020 07:29) (96% - 96%)    PHYSICAL EXAM:  GENERAL:   NECK: Supple, No JVD, Normal thyroid  CHEST/LUNG: Clear; No crackles or wheezing  HEART: S1, S2, Regular rate and rhythm;   ABDOMEN: Soft, Nontender, Nondistended; Bowel sounds present  EXTREMITIES: No clubbing, cyanosis, or edema  SKIN: No rashes or lesions    LABS:                        12.1   4.78  )-----------( 223      ( 08 Oct 2020 05:24 )             36.2             10-08    137  |  103  |  9<L>  ----------------------------<  90  3.7   |  27  |  0.7    Ca    8.3<L>      08 Oct 2020 05:24    TPro  5.1<L>  /  Alb  3.2<L>  /  TBili  0.4  /  DBili  x   /  AST  23  /  ALT  21  /  AlkPhos  51  10-08    LIVER FUNCTIONS - ( 08 Oct 2020 05:24 )  Alb: 3.2 g/dL / Pro: 5.1 g/dL / ALK PHOS: 51 U/L / ALT: 21 U/L / AST: 23 U/L / GGT: x                     iLipase, Serum in AM (10.08.20 @ 05:24)   Lipase, Serum: 194 U/L       Historical Values  Lipase, Serum in AM (10.08.20 @ 05:24)   Lipase, Serum: 194 U/L    POST PROCEDURE 10/6/20 => 1180    Lipase, Serum in AM (10.05.20 @ 07:13)   Lipase, Serum: 47 U/L   Lipase, Serum in AM (10.03.20 @ 07:05)   Lipase, Serum: 55 U/L   Lipase, Serum in AM (10.02.20 @ 06:00)   Lipase, Serum: 256 U/L     Admission Lipase > 3000          RADIOLOGY & ADDITIONAL TESTS:    RADIOLOGY & ADDITIONAL TESTS:

## 2020-10-08 NOTE — PROGRESS NOTE ADULT - SUBJECTIVE AND OBJECTIVE BOX
Hepatology/GI follow up note: Pt seen and examined at bedside.     For questions and inquiries please page [phone].  For urgent matters or after 5pm and on weekends please page the fellow on call through the GI paging system.    55y Male seen for: recurrent acute pancreatitis    Subjective/Interval events:      Review of system  General:  (-) weight loss, (-) fevers  Eyes:  (-) visual changes  CV:  (-) chest pain  Resp: (-) SOB, (-) wheezing  GI: (-) abdominal pain,  (-) nausea, (-) vomiting, (-) dysphagia, (-) diarrhea, (-) constipation, (-) rectal bleeding, (-) melena, (-) hematemesis.  Neuro: (-) confusion, (-) weakness  Psych:  (-) Hallucinations  Heme:  (-) easy bruisability    Past medical/surgical Hx:  PAST MEDICAL & SURGICAL HISTORY:  Pancreatitis      Home Medications:  Last Order Reconciliation Date: 10-01-20 @ 17:54 (Admission Reconciliation)  folic acid 1 mg oral tablet: 1 tab(s) orally once a day  Multiple Vitamins oral tablet: 1 tab(s) orally once a day  senna oral tablet: 2 tab(s) orally once a day (at bedtime)  thiamine 100 mg oral tablet: 1 tab(s) orally once a day      Allergies:  No Known Allergies      Current Medications:   acetaminophen   Tablet .. 650 milliGRAM(s) Oral every 6 hours PRN  ciprofloxacin   IVPB      ciprofloxacin   IVPB 400 milliGRAM(s) IV Intermittent every 12 hours  enoxaparin Injectable 40 milliGRAM(s) SubCutaneous daily  folic acid 1 milliGRAM(s) Oral daily  influenza   Vaccine 0.5 milliLiter(s) IntraMuscular once  lactated ringers. 1000 milliLiter(s) IV Continuous <Continuous>  multivitamin 1 Tablet(s) Oral daily  polyethylene glycol 3350 17 Gram(s) Oral at bedtime  senna 2 Tablet(s) Oral at bedtime  thiamine 100 milliGRAM(s) Oral daily        Physical exam:  T(C): 37.2 (10-08-20 @ 05:00), Max: 37.8 (10-07-20 @ 17:15)  HR: 80 (10-08-20 @ 05:00) (80 - 86)  BP: 100/58 (10-08-20 @ 05:00) (94/51 - 107/64)  RR: 18 (10-08-20 @ 05:00) (18 - 18)  SpO2: 96% (10-08-20 @ 07:29) (96% - 96%)    GENERAL: NAD  HEAD:  Atraumatic, Normocephalic  EYES: Sclera:NL  NECK: Supple, no JVD or thyromegaly  CHEST/LUNG: Good bilateral air entry  HEART: normal S1, S2. Regular  ABDOMEN: (-) distended, (-) tender, (-) rebound, (+) BS, (-)HSM  EXTREMITIES: (-) edema  NEUROLOGY: (-) asterixis  SKIN: (-) jaundice      Data:                        12.1   4.78  )-----------( 223      ( 08 Oct 2020 05:24 )             36.2     MCV 89.2 (10-08-20)  90.9 (10-07-20)    RDW 12.5 (10-08-20)  12.5 (10-07-20)    HGB trend:  12.1  10-08-20 @ 05:24  13.8  10-07-20 @ 11:14  13.7  10-06-20 @ 05:23      WBC trend:  4.78  10-08-20 @ 05:24  8.72  10-07-20 @ 11:14  9.43  10-06-20 @ 05:23    10-08    137  |  103  |  9<L>  ----------------------------<  90  3.7   |  27  |  0.7    Ca    8.3<L>      08 Oct 2020 05:24    TPro  5.1<L>  /  Alb  3.2<L>  /  TBili  0.4  /  DBili  x   /  AST  23  /  ALT  21  /  AlkPhos  51  10-08    Liver panel trend:  TBili 0.4   /   AST 23   /   ALT 21   /   AlkP 51   /   Tptn 5.1   /   Alb 3.2    /   DBili --      10-08  TBili 0.6   /   AST 25   /   ALT 24   /   AlkP 54   /   Tptn 5.9   /   Alb 3.6    /   DBili --      10-07  TBili 0.4   /   AST 39   /   ALT 29   /   AlkP 53   /   Tptn 6.3   /   Alb 3.9    /   DBili --      10-06  TBili 0.5   /   AST 14   /   ALT 11   /   AlkP 58   /   Tptn 6.3   /   Alb 3.7    /   DBili --      10-03  TBili 0.5   /   AST 14   /   ALT 13   /   AlkP 64   /   Tptn 5.8   /   Alb 3.5    /   DBili --      10-02  TBili 0.3   /   AST 19   /   ALT 18   /   AlkP 64   /   Tptn 7.0   /   Alb 4.4    /   DBili --      10-01               Hepatology/GI follow up note: Pt seen and examined at bedside.     For questions and inquiries please page [phone].  For urgent matters or after 5pm and on weekends please page the fellow on call through the GI paging system.    55y Male seen for: recurrent acute pancreatitis    Subjective/Interval events:  Tolerating diet  No pain  Last morphine yesterday at 9AM    Review of system  General:  (-) weight loss, (-) fevers  Eyes:  (-) visual changes  CV:  (-) chest pain  Resp: (-) SOB, (-) wheezing  GI: (-) abdominal pain,  (-) nausea, (-) vomiting, (-) dysphagia, (-) diarrhea, (-) constipation, (-) rectal bleeding, (-) melena, (-) hematemesis.  Neuro: (-) confusion, (-) weakness  Psych:  (-) Hallucinations  Heme:  (-) easy bruisability    Past medical/surgical Hx:  PAST MEDICAL & SURGICAL HISTORY:  Pancreatitis      Home Medications:  Last Order Reconciliation Date: 10-01-20 @ 17:54 (Admission Reconciliation)  folic acid 1 mg oral tablet: 1 tab(s) orally once a day  Multiple Vitamins oral tablet: 1 tab(s) orally once a day  senna oral tablet: 2 tab(s) orally once a day (at bedtime)  thiamine 100 mg oral tablet: 1 tab(s) orally once a day      Allergies:  No Known Allergies      Current Medications:   acetaminophen   Tablet .. 650 milliGRAM(s) Oral every 6 hours PRN  ciprofloxacin   IVPB      ciprofloxacin   IVPB 400 milliGRAM(s) IV Intermittent every 12 hours  enoxaparin Injectable 40 milliGRAM(s) SubCutaneous daily  folic acid 1 milliGRAM(s) Oral daily  influenza   Vaccine 0.5 milliLiter(s) IntraMuscular once  lactated ringers. 1000 milliLiter(s) IV Continuous <Continuous>  multivitamin 1 Tablet(s) Oral daily  polyethylene glycol 3350 17 Gram(s) Oral at bedtime  senna 2 Tablet(s) Oral at bedtime  thiamine 100 milliGRAM(s) Oral daily        Physical exam:  T(C): 37.2 (10-08-20 @ 05:00), Max: 37.8 (10-07-20 @ 17:15)  HR: 80 (10-08-20 @ 05:00) (80 - 86)  BP: 100/58 (10-08-20 @ 05:00) (94/51 - 107/64)  RR: 18 (10-08-20 @ 05:00) (18 - 18)  SpO2: 96% (10-08-20 @ 07:29) (96% - 96%)    GENERAL: NAD  HEAD:  Atraumatic, Normocephalic  EYES: Sclera:NL  NECK: Supple, no JVD or thyromegaly  CHEST/LUNG: Good bilateral air entry  HEART: normal S1, S2. Regular  ABDOMEN: (-) distended, (-) tender, (-) rebound, (+) BS, (-)HSM  EXTREMITIES: (-) edema  NEUROLOGY: (-) asterixis  SKIN: (-) jaundice      Data:                        12.1   4.78  )-----------( 223      ( 08 Oct 2020 05:24 )             36.2     MCV 89.2 (10-08-20)  90.9 (10-07-20)    RDW 12.5 (10-08-20)  12.5 (10-07-20)    HGB trend:  12.1  10-08-20 @ 05:24  13.8  10-07-20 @ 11:14  13.7  10-06-20 @ 05:23      WBC trend:  4.78  10-08-20 @ 05:24  8.72  10-07-20 @ 11:14  9.43  10-06-20 @ 05:23    10-08    137  |  103  |  9<L>  ----------------------------<  90  3.7   |  27  |  0.7    Ca    8.3<L>      08 Oct 2020 05:24    TPro  5.1<L>  /  Alb  3.2<L>  /  TBili  0.4  /  DBili  x   /  AST  23  /  ALT  21  /  AlkPhos  51  10-08    Liver panel trend:  TBili 0.4   /   AST 23   /   ALT 21   /   AlkP 51   /   Tptn 5.1   /   Alb 3.2    /   DBili --      10-08  TBili 0.6   /   AST 25   /   ALT 24   /   AlkP 54   /   Tptn 5.9   /   Alb 3.6    /   DBili --      10-07  TBili 0.4   /   AST 39   /   ALT 29   /   AlkP 53   /   Tptn 6.3   /   Alb 3.9    /   DBili --      10-06  TBili 0.5   /   AST 14   /   ALT 11   /   AlkP 58   /   Tptn 6.3   /   Alb 3.7    /   DBili --      10-03  TBili 0.5   /   AST 14   /   ALT 13   /   AlkP 64   /   Tptn 5.8   /   Alb 3.5    /   DBili --      10-02  TBili 0.3   /   AST 19   /   ALT 18   /   AlkP 64   /   Tptn 7.0   /   Alb 4.4    /   DBili --      10-01

## 2020-10-08 NOTE — PROGRESS NOTE ADULT - ASSESSMENT
ASSESSMENT/PLAN  55 years old male with history of HLD, former tobacco use, quit within the past year, who presented to ED with worsening epigastric pain. Admitted for pancreatitis.  Acute, recurrent pancreatitis   - [facility] F/U noted, stable for D/C home  Obesity  at home continue with low fat diet  1500kcal diet with a 100gm of protein    ASSESSMENT/PLAN  55 years old male with history of HLD, former tobacco use, quit within the past year, who presented to ED with worsening epigastric pain. Admitted for pancreatitis.  Acute, recurrent pancreatitis   - [facility] F/U noted, stable for D/C home  -Obesity   - vitamin D deficiency    residents ordered dash diet, low cholesterol - this is not adequate  pt needs dash, low cholesterol, LOW FAT, CALORIE RESTRICTED diet  dietary education and continued outpt f/u (clinic dietitian?)  also note low BUN c/w inadequate protein intake  1500kcal diet with a 100gm of protein recommended  give 50,000 IU vitamin D daily x 2 days, then 2000 IU daily - repeat level in 1 month as outpt and f/u with PMD     ASSESSMENT/PLAN  55 years old male with history of HLD, former tobacco use, quit within the past year, who presented to ED with worsening epigastric pain. Admitted for pancreatitis.  Acute, recurrent pancreatitis   - [facility] F/U noted, stable for D/C home  -Obesity   - vitamin D deficiency    residents ordered dash diet, low cholesterol - this is not adequate  pt needs dash, low cholesterol, LOW FAT, CALORIE RESTRICTED diet  dietary education and continued outpt f/u (clinic dietitian?)  also note low BUN c/w inadequate protein intake  1500kcal diet with a 100gm of protein recommended  give 50,000 IU vitamin D daily x 2 days, then 2000 IU daily - repeat level in 1 month as outpt and f/u with PMD  d/c thiamine and folate after 7 days

## 2020-10-08 NOTE — PROGRESS NOTE ADULT - REASON FOR ADMISSION
Epigastric pain

## 2020-10-08 NOTE — PROGRESS NOTE ADULT - ASSESSMENT
56 yo male with recurrent pancreatitis of unknown etiology.     1) Recurrent mild acute ideopathic uncomplicated pancreatitis:    no e/o cholelithiasis, no hx of EtOH; normal TGs and calcium. Suspect either genetic etiology vs. small obstructing lesion vs. autoimmune vs. malignant pre malignant process SP EUS W [facility] on 10/6 Awaiting pathology    Rec:  - Await pathology  - May MD home to follow up with Dr. Malik  - MD home on cipro x 2 more days

## 2020-10-08 NOTE — PROGRESS NOTE ADULT - ASSESSMENT
55 years old male with history of HLD, former tobacco use, quit within the past year, who presented to ED with worsening epigastric pain. Admitted for pancreatitis.    Acute, recurrent pancreatitis   - lipase trending down again  - MRCP noted  - tolerating advance diet  - remains pain free  - GI and Nutrition notes appreciated  - supplements ordered  - EUS results noted  - [facility] F/U noted, stable for D/C home    Obesity  - diet counselling  - patient had been on diet since last hospital stay  - weight reduction needed    DVT ppx ; Lovenox  GI ppx : not needed  Ambulate as tolerated   Diet : Low fat/Cholesterol  Code Full    D/C home today  f/u in office in 10 days

## 2020-10-08 NOTE — PROGRESS NOTE ADULT - SUBJECTIVE AND OBJECTIVE BOX
----------Daily Progress Note----------    HISTORY OF PRESENT ILLNESS:  Patient is a 55y old Male who presents with a chief complaint of Epigastric pain (07 Oct 2020 11:52)    Currently admitted to medicine with the primary diagnosis of Recurrent pancreatitis       Today is hospital day 7d.     INTERVAL HOSPITAL COURSE / OVERNIGHT EVENTS:    Patient was seen and examined at the bedside. This morning he reports that he is feeling better, but feels a little weak and still has some residual epigastric tenderness.  Overnight, patient had a headache and low grade fever but received tylenol which resolved his symptoms. He also reports that he had one episode of watery diarrhea with no associated pain or other symptoms. Otherwise, patient is well and is hopeful to go home later today.    Review of Systems: Otherwise unremarkable     PAST MEDICAL & SURGICAL HISTORY:  Pancreatitis      ALLERGIES:  No Known Allergies    MEDICATIONS:  STANDING MEDICATIONS  ciprofloxacin   IVPB      ciprofloxacin   IVPB 400 milliGRAM(s) IV Intermittent every 12 hours  enoxaparin Injectable 40 milliGRAM(s) SubCutaneous daily  folic acid 1 milliGRAM(s) Oral daily  influenza   Vaccine 0.5 milliLiter(s) IntraMuscular once  lactated ringers. 1000 milliLiter(s) IV Continuous <Continuous>  multivitamin 1 Tablet(s) Oral daily  polyethylene glycol 3350 17 Gram(s) Oral at bedtime  senna 2 Tablet(s) Oral at bedtime  thiamine 100 milliGRAM(s) Oral daily    PRN MEDICATIONS  acetaminophen   Tablet .. 650 milliGRAM(s) Oral every 6 hours PRN  morphine  - Injectable 4 milliGRAM(s) IV Push every 6 hours PRN    VITALS:  T(F): 99  HR: 80  BP: 100/58  RR: 18  SpO2: 96%    PHYSICAL EXAM:  GENERAL: Well developed and in no acute distress. Resting comfortably in bed.  HEENT: Normocephalic, mucous membranes moist, bilateral sclera anicteric, no conjunctival injection.  Neck: Supple, non-tender, no lymphadenopathy.  PULMONARY: Clear to auscultation bilaterally. No wheezing, rhonchi, or rales.  CARDIOVASCULAR: Regular rate and rhythm, S1-S2. No murmurs  GASTROINTESTINAL: Soft, non-tender, non-distended, no guarding. Bowel sounds present.  SKIN/EXTREMITIES: No clubbing or edema  NEUROLOGIC/MUSCULOSKELETAL: Alert and oriented x 3. No focal deficits    LABS:                        12.1   4.78  )-----------( 223      ( 08 Oct 2020 05:24 )             36.2     10-08    137  |  103  |  9<L>  ----------------------------<  90  3.7   |  27  |  0.7    Ca    8.3<L>      08 Oct 2020 05:24    TPro  5.1<L>  /  Alb  3.2<L>  /  TBili  0.4  /  DBili  x   /  AST  23  /  ALT  21  /  AlkPhos  51  10-08        RADIOLOGY:  - RUQ US of Abdomen (01 Oct 2020)  --> No evidence of cholecystitis. Pancreas obscured by bowel gas.    -MRCP/MRI of Abdomen w/ and w/o contrast (02 Oct 2020) --> Acute interstitial edematous pancreatitis w/ no evidence of fluid collection. 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 PD 1.5 mm in the tail and body and 2mm in the head. CBD 3.2 in the head.     ASSESSMENT & PLAN:    Assessment:    55 year old male with hx of HLD, former tobacco use, quit within the past year, who presented to ED with worsening epigastric pain. Admitted for pancreatitis.    # Acute pancreatitis in the setting of recurrent pancreatitis  -Acutely severe abdominal pain w/ lipase >3000 (01 Oct 2020)  -Associated with 2.2cm cystic lesion of pancreatic uncinate process communicating with main pancreatic duct  -Abdominal US of RUQ (01 Oct 2020) -->unremarkable for gall stone or cholecystitis  -IgG4 negative for autoimmune etiology  -MRCP findings (02 Oct 2020)-- Acute interstitial edematous pancreatitis w/ no evidence of fluid collection. Pancreatic side branch Intraductal Papillary Mucinous Neoplasm (IPMN)  -s/p EUS with FNB needle (06 Oct 2020) --> 2.3 x 2.7 septated simple cyst. Likely complicated with pancreatitis given the history of severe epigastric pain after procedure and lipase of 1500. Lipase trending back down-- 194 currently (08 Oct 2020).  - Currently on 400 IV Cipro 400mg and 200mL IV fluid.   - Clear fluids for now & Iv hydration at a rate of 200 cc/h. Advance to low fat diet after lunch.     # Obesity  - diet counselling  - repeat lipid panel : no hypertriglyceridemia  - HBA1C: 5.9%    DVT ppx ; Lovenox  GI ppx : not needed  Ambulate as tolerated   Diet : Clear liquid  Code Full   ----------Daily Progress Note----------    HISTORY OF PRESENT ILLNESS:  Patient is a 55y old Male who presents with a chief complaint of Epigastric pain (07 Oct 2020 11:52)    Currently admitted to medicine with the primary diagnosis of Recurrent pancreatitis       Today is hospital day 7d.     INTERVAL HOSPITAL COURSE / OVERNIGHT EVENTS:    Patient was seen and examined at the bedside. This morning he reports that he is feeling better, but feels a little weak and still has some residual epigastric tenderness.  Overnight, patient had a headache and low grade fever but received tylenol which resolved his symptoms. He also reports that he had one episode of watery diarrhea with no associated pain or other symptoms. Otherwise, patient is well and is hopeful to go home later today.    Review of Systems: Otherwise unremarkable     PAST MEDICAL & SURGICAL HISTORY:  Pancreatitis      ALLERGIES:  No Known Allergies    MEDICATIONS:  STANDING MEDICATIONS  ciprofloxacin   IVPB      ciprofloxacin   IVPB 400 milliGRAM(s) IV Intermittent every 12 hours  enoxaparin Injectable 40 milliGRAM(s) SubCutaneous daily  folic acid 1 milliGRAM(s) Oral daily  influenza   Vaccine 0.5 milliLiter(s) IntraMuscular once  lactated ringers. 1000 milliLiter(s) IV Continuous <Continuous>  multivitamin 1 Tablet(s) Oral daily  polyethylene glycol 3350 17 Gram(s) Oral at bedtime  senna 2 Tablet(s) Oral at bedtime  thiamine 100 milliGRAM(s) Oral daily    PRN MEDICATIONS  acetaminophen   Tablet .. 650 milliGRAM(s) Oral every 6 hours PRN  morphine  - Injectable 4 milliGRAM(s) IV Push every 6 hours PRN    VITALS:  T(F): 99  HR: 80  BP: 100/58  RR: 18  SpO2: 96%    PHYSICAL EXAM:  GENERAL: Well developed and in no acute distress. Resting comfortably in bed.  HEENT: Normocephalic, mucous membranes moist, bilateral sclera anicteric, no conjunctival injection.  Neck: Supple, non-tender, no lymphadenopathy.  PULMONARY: Clear to auscultation bilaterally. No wheezing, rhonchi, or rales.  CARDIOVASCULAR: Regular rate and rhythm, S1-S2. No murmurs  GASTROINTESTINAL: Soft, non-tender, non-distended, no guarding. Bowel sounds present.  SKIN/EXTREMITIES: No clubbing or edema  NEUROLOGIC/MUSCULOSKELETAL: Alert and oriented x 3. No focal deficits    LABS:                        12.1   4.78  )-----------( 223      ( 08 Oct 2020 05:24 )             36.2     10-08    137  |  103  |  9<L>  ----------------------------<  90  3.7   |  27  |  0.7    Ca    8.3<L>      08 Oct 2020 05:24    TPro  5.1<L>  /  Alb  3.2<L>  /  TBili  0.4  /  DBili  x   /  AST  23  /  ALT  21  /  AlkPhos  51  10-08        RADIOLOGY:  - RUQ US of Abdomen (01 Oct 2020)  --> No evidence of cholecystitis. Pancreas obscured by bowel gas.    -MRCP/MRI of Abdomen w/ and w/o contrast (02 Oct 2020) --> Acute interstitial edematous pancreatitis w/ no evidence of fluid collection. 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 PD 1.5 mm in the tail and body and 2mm in the head. CBD 3.2 in the head.     ASSESSMENT & PLAN:    Assessment:    55 year old male with hx of HLD, former tobacco use, quit within the past year, who presented to ED with worsening epigastric pain. Admitted for pancreatitis.    # Acute pancreatitis in the setting of recurrent pancreatitis  -Acutely severe abdominal pain w/ lipase >3000 (01 Oct 2020)  -Associated with 2.2cm cystic lesion of pancreatic uncinate process communicating with main pancreatic duct  -Abdominal US of RUQ (01 Oct 2020) -->unremarkable for gall stone or cholecystitis  -IgG4 negative for autoimmune etiology  -MRCP findings (02 Oct 2020)-- Acute interstitial edematous pancreatitis w/ no evidence of fluid collection. Pancreatic side branch Intraductal Papillary Mucinous Neoplasm (IPMN)  -s/p EUS with FNB needle (06 Oct 2020) --> 2.3 x 2.7 septated simple cyst. Likely complicated with pancreatitis given the history of severe epigastric pain after procedure and lipase of 1500. Lipase trending back down-- 194 currently (08 Oct 2020).  - Currently on 400 IV Cipro 400mg and 200mL IV fluid.   - Advance to low fat diet.    # Obesity  - diet counselling  - repeat lipid panel : no hypertriglyceridemia  - HBA1C: 5.9%    DVT ppx ; Lovenox  GI ppx : not needed  Ambulate as tolerated   Diet : Clear liquid  Code Full   ----------Daily Progress Note----------    HISTORY OF PRESENT ILLNESS:  Patient is a 55y old Male who presents with a chief complaint of Epigastric pain (07 Oct 2020 11:52)    Currently admitted to medicine with the primary diagnosis of Recurrent pancreatitis       Today is hospital day 7d.     INTERVAL HOSPITAL COURSE / OVERNIGHT EVENTS:    Patient was seen and examined at the bedside. This morning he reports that he is feeling better, but feels a little weak and still has some residual epigastric tenderness.  Overnight, patient had a headache and low grade fever but received tylenol which resolved his symptoms. He also reports that he had one episode of watery diarrhea with no associated pain or other symptoms. Otherwise, patient is well and is hopeful to go home later today.    Review of Systems: Otherwise unremarkable     PAST MEDICAL & SURGICAL HISTORY:  Pancreatitis      ALLERGIES:  No Known Allergies    MEDICATIONS:  STANDING MEDICATIONS  ciprofloxacin   IVPB      ciprofloxacin   IVPB 400 milliGRAM(s) IV Intermittent every 12 hours  enoxaparin Injectable 40 milliGRAM(s) SubCutaneous daily  folic acid 1 milliGRAM(s) Oral daily  influenza   Vaccine 0.5 milliLiter(s) IntraMuscular once  lactated ringers. 1000 milliLiter(s) IV Continuous <Continuous>  multivitamin 1 Tablet(s) Oral daily  polyethylene glycol 3350 17 Gram(s) Oral at bedtime  senna 2 Tablet(s) Oral at bedtime  thiamine 100 milliGRAM(s) Oral daily    PRN MEDICATIONS  acetaminophen   Tablet .. 650 milliGRAM(s) Oral every 6 hours PRN  morphine  - Injectable 4 milliGRAM(s) IV Push every 6 hours PRN    VITALS:  T(F): 99  HR: 80  BP: 100/58  RR: 18  SpO2: 96%    PHYSICAL EXAM:  GENERAL: Well developed and in no acute distress. Resting comfortably in bed.  HEENT: Normocephalic, mucous membranes moist, bilateral sclera anicteric, no conjunctival injection.  Neck: Supple, non-tender, no lymphadenopathy.  PULMONARY: Clear to auscultation bilaterally. No wheezing, rhonchi, or rales.  CARDIOVASCULAR: Regular rate and rhythm, S1-S2. No murmurs  GASTROINTESTINAL: Soft, non-tender, non-distended, no guarding. Bowel sounds present.  SKIN/EXTREMITIES: No clubbing or edema  NEUROLOGIC/MUSCULOSKELETAL: Alert and oriented x 3. No focal deficits    LABS:                        12.1   4.78  )-----------( 223      ( 08 Oct 2020 05:24 )             36.2     10-08    137  |  103  |  9<L>  ----------------------------<  90  3.7   |  27  |  0.7    Ca    8.3<L>      08 Oct 2020 05:24    TPro  5.1<L>  /  Alb  3.2<L>  /  TBili  0.4  /  DBili  x   /  AST  23  /  ALT  21  /  AlkPhos  51  10-08        RADIOLOGY:  - RUQ US of Abdomen (01 Oct 2020)  --> No evidence of cholecystitis. Pancreas obscured by bowel gas.    -MRCP/MRI of Abdomen w/ and w/o contrast (02 Oct 2020) --> Acute interstitial edematous pancreatitis w/ no evidence of fluid collection. 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 PD 1.5 mm in the tail and body and 2mm in the head. CBD 3.2 in the head.     ASSESSMENT & PLAN:    Assessment:    55 year old male with hx of HLD, former tobacco use, quit within the past year, who presented to ED with worsening epigastric pain. Admitted for pancreatitis.    # Acute pancreatitis in the setting of recurrent pancreatitis  -Acutely severe abdominal pain w/ lipase >3000 (01 Oct 2020)  -Associated with 2.2cm cystic lesion of pancreatic uncinate process communicating with main pancreatic duct  -Abdominal US of RUQ (01 Oct 2020) -->unremarkable for gall stone or cholecystitis  -IgG4 negative for autoimmune etiology  -MRCP findings (02 Oct 2020)-- Acute interstitial edematous pancreatitis w/ no evidence of fluid collection. Pancreatic side branch Intraductal Papillary Mucinous Neoplasm (IPMN)  -s/p EUS with FNB needle (06 Oct 2020) --> 2.3 x 2.7 septated simple cyst. Likely complicated with pancreatitis given the history of severe epigastric pain after procedure and lipase of 1500. Lipase trending back down-- 194 currently (08 Oct 2020).  - Currently on 400 IV Cipro 400mg and 200mL IV fluid.   - Advance to low fat diet then DC IV fluid once tolerated.      # Obesity  - diet counselling  - repeat lipid panel : no hypertriglyceridemia  - HBA1C: 5.9%    DVT ppx ; Lovenox  GI ppx : not needed  Ambulate as tolerated   Diet : Clear liquid-->advance to low fat diet  Plan to dc today if tolerating low fat diet  Code Full   ----------Daily Progress Note----------    HISTORY OF PRESENT ILLNESS:  Patient is a 55y old Male who presents with a chief complaint of Epigastric pain (07 Oct 2020 11:52)    Currently admitted to medicine with the primary diagnosis of Recurrent pancreatitis       Today is hospital day 7d.     INTERVAL HOSPITAL COURSE / OVERNIGHT EVENTS:    Patient was seen and examined at the bedside. This morning he reports that he is feeling better, but feels a little weak and still has some residual epigastric tenderness.  Overnight, patient had a headache and low grade fever but received tylenol which resolved his symptoms. He also reports that he had one episode of watery diarrhea with no associated pain or other symptoms. Otherwise, patient is well and is hopeful to go home later today.  PATIENT IS PAIN FREE AND TOLERATING DIET    Review of Systems: Otherwise unremarkable     PAST MEDICAL & SURGICAL HISTORY:  Pancreatitis      ALLERGIES:  No Known Allergies    MEDICATIONS:  STANDING MEDICATIONS  ciprofloxacin   IVPB      ciprofloxacin   IVPB 400 milliGRAM(s) IV Intermittent every 12 hours  enoxaparin Injectable 40 milliGRAM(s) SubCutaneous daily  folic acid 1 milliGRAM(s) Oral daily  influenza   Vaccine 0.5 milliLiter(s) IntraMuscular once  lactated ringers. 1000 milliLiter(s) IV Continuous <Continuous>  multivitamin 1 Tablet(s) Oral daily  polyethylene glycol 3350 17 Gram(s) Oral at bedtime  senna 2 Tablet(s) Oral at bedtime  thiamine 100 milliGRAM(s) Oral daily    PRN MEDICATIONS  acetaminophen   Tablet .. 650 milliGRAM(s) Oral every 6 hours PRN  morphine  - Injectable 4 milliGRAM(s) IV Push every 6 hours PRN    VITALS:  T(F): 99  HR: 80  BP: 100/58  RR: 18  SpO2: 96%    PHYSICAL EXAM:  GENERAL: Well developed and in no acute distress. Resting comfortably in bed.  HEENT: Normocephalic, mucous membranes moist, bilateral sclera anicteric, no conjunctival injection.  Neck: Supple, non-tender, no lymphadenopathy.  PULMONARY: Clear to auscultation bilaterally. No wheezing, rhonchi, or rales.  CARDIOVASCULAR: Regular rate and rhythm, S1-S2. No murmurs  GASTROINTESTINAL: Soft, non-tender, non-distended, no guarding. Bowel sounds present.  SKIN/EXTREMITIES: No clubbing or edema  NEUROLOGIC/MUSCULOSKELETAL: Alert and oriented x 3. No focal deficits    LABS:                        12.1   4.78  )-----------( 223      ( 08 Oct 2020 05:24 )             36.2     10-08    137  |  103  |  9<L>  ----------------------------<  90  3.7   |  27  |  0.7    Ca    8.3<L>      08 Oct 2020 05:24    TPro  5.1<L>  /  Alb  3.2<L>  /  TBili  0.4  /  DBili  x   /  AST  23  /  ALT  21  /  AlkPhos  51  10-08        RADIOLOGY:  - RUQ US of Abdomen (01 Oct 2020)  --> No evidence of cholecystitis. Pancreas obscured by bowel gas.    -MRCP/MRI of Abdomen w/ and w/o contrast (02 Oct 2020) --> Acute interstitial edematous pancreatitis w/ no evidence of fluid collection. 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 PD 1.5 mm in the tail and body and 2mm in the head. CBD 3.2 in the head.     ASSESSMENT & PLAN:    Assessment:    55 year old male with hx of HLD, former tobacco use, quit within the past year, who presented to ED with worsening epigastric pain. Admitted for pancreatitis.    # Acute pancreatitis in the setting of recurrent pancreatitis  -Acutely severe abdominal pain w/ lipase >3000 (01 Oct 2020)  -Associated with 2.2cm cystic lesion of pancreatic uncinate process communicating with main pancreatic duct  -Abdominal US of RUQ (01 Oct 2020) -->unremarkable for gall stone or cholecystitis  -IgG4 negative for autoimmune etiology  -MRCP findings (02 Oct 2020)-- Acute interstitial edematous pancreatitis w/ no evidence of fluid collection. Pancreatic side branch Intraductal Papillary Mucinous Neoplasm (IPMN)  -s/p EUS with FNB needle (06 Oct 2020) --> 2.3 x 2.7 septated simple cyst. Likely complicated with pancreatitis given the history of severe epigastric pain after procedure and lipase of 1500. Lipase trending back down-- 194 currently (08 Oct 2020).  - Currently on 400 IV Cipro 400mg and 200mL IV fluid.   - Advance to low fat diet then DC IV fluid once tolerated.      # Obesity  - diet counselling  - repeat lipid panel : no hypertriglyceridemia  - HBA1C: 5.9%    DVT ppx ; Lovenox  GI ppx : not needed  Ambulate as tolerated   Diet : Clear liquid-->advance to low fat diet  Plan to dc today if tolerating low fat diet  Code Full

## 2020-10-08 NOTE — DISCHARGE NOTE NURSING/CASE MANAGEMENT/SOCIAL WORK - PATIENT PORTAL LINK FT
You can access the FollowMyHealth Patient Portal offered by [facility] by registering at the following website: [email or web address]. By joining [facility]’s FollowMyHealth portal, you will also be able to view your health information using other applications (apps) compatible with our system.

## 2020-10-08 NOTE — PROGRESS NOTE ADULT - SUBJECTIVE AND OBJECTIVE BOX
Patient is a 55y old  Male who presents with a chief complaint of Epigastric pain (08 Oct 2020 13:29)  pt on po diet to be discharged home today   hx of pancreatitis    ICU Vital Signs Last 24 Hrs  T(C): 37.2 (08 Oct 2020 05:00), Max: 37.8 (07 Oct 2020 17:15)  T(F): 99 (08 Oct 2020 05:00), Max: 100.1 (07 Oct 2020 17:15)  HR: 79 (08 Oct 2020 14:42) (79 - 82)  BP: 126/67 (08 Oct 2020 14:42) (94/51 - 126/67)  RR: 18 (08 Oct 2020 14:42) (18 - 18)  SpO2: 96% (08 Oct 2020 07:29) (96% - 96%)      Drug Dosing Weight  Height (cm): 177.8 (06 Oct 2020 14:20)  Weight (kg): 97.8 (06 Oct 2020 14:20)  BMI (kg/m2): 30.9 (06 Oct 2020 14:20)  BSA (m2): 2.16 (06 Oct 2020 14:20)    I&O's Detail    MEDICATIONS  (STANDING):  ciprofloxacin   IVPB      ciprofloxacin   IVPB 400 milliGRAM(s) IV Intermittent every 12 hours  enoxaparin Injectable 40 milliGRAM(s) SubCutaneous daily  folic acid 1 milliGRAM(s) Oral daily  influenza   Vaccine 0.5 milliLiter(s) IntraMuscular once  multivitamin 1 Tablet(s) Oral daily  polyethylene glycol 3350 17 Gram(s) Oral at bedtime  senna 2 Tablet(s) Oral at bedtime  thiamine 100 milliGRAM(s) Oral daily      Diet, DASH/TLC:   Sodium & Cholesterol Restricted (10-08-20 @ 08:58)      LABS  10-08    137  |  103  |  9<L>  ----------------------------<  90  3.7   |  27  |  0.7    Ca    8.3<L>      08 Oct 2020 05:24    TPro  5.1<L>  /  Alb  3.2<L>  /  TBili  0.4  /  DBili  x   /  AST  23  /  ALT  21  /  AlkPhos  51  10-08                          12.1   4.78  )-----------( 223      ( 08 Oct 2020 05:24 )             36.2      RADIOLOGY STUDIES  r< from: MR Abdomen w/wo IV Cont (10.02.20 @ 22:52) >  Impression:  1. Findings consistent with acute interstitial edematous pancreatitis, improved since September 20, 2020. No evidence of acute fluid collection.  2. Pancreatic uncinate process 2.2 cm cystic lesion with communication to nondilated main pancreatic duct, most consistent with pancreatic sidebranch IPMN. Follow-up MRCP in 6-12months recommended.  3. Left adrenal 1.1 cm adenoma.           Patient is a 55y old  Male who presents with a chief complaint of Epigastric pain (08 Oct 2020 13:29)  pt on po diet to be discharged home today   hx of pancreatitis, recurrent    ICU Vital Signs Last 24 Hrs  T(C): 37.2 (08 Oct 2020 05:00), Max: 37.8 (07 Oct 2020 17:15)  T(F): 99 (08 Oct 2020 05:00), Max: 100.1 (07 Oct 2020 17:15)  HR: 79 (08 Oct 2020 14:42) (79 - 82)  BP: 126/67 (08 Oct 2020 14:42) (94/51 - 126/67)  RR: 18 (08 Oct 2020 14:42) (18 - 18)  SpO2: 96% (08 Oct 2020 07:29) (96% - 96%)    Drug Dosing Weight  Height (cm): 177.8 (06 Oct 2020 14:20)  Weight (kg): 97.8 (06 Oct 2020 14:20)  BMI (kg/m2): 30.9 (06 Oct 2020 14:20)  BSA (m2): 2.16 (06 Oct 2020 14:20)    I&O's Detail    MEDICATIONS  (STANDING):  ciprofloxacin   IVPB      ciprofloxacin   IVPB 400 milliGRAM(s) IV Intermittent every 12 hours  enoxaparin Injectable 40 milliGRAM(s) SubCutaneous daily  folic acid 1 milliGRAM(s) Oral daily  influenza   Vaccine 0.5 milliLiter(s) IntraMuscular once  multivitamin 1 Tablet(s) Oral daily  polyethylene glycol 3350 17 Gram(s) Oral at bedtime  senna 2 Tablet(s) Oral at bedtime  thiamine 100 milliGRAM(s) Oral daily    Diet, DASH/TLC:   Sodium & Cholesterol Restricted (10-08-20 @ 08:58)    LABS  10-08    137  |  103  |  9<L>  ----------------------------<  90  3.7   |  27  |  0.7    Ca    8.3<L>      08 Oct 2020 05:24    TPro  5.1<L>  /  Alb  3.2<L>  /  TBili  0.4  /  DBili  x   /  AST  23  /  ALT  21  /  AlkPhos  51  10-08      Vitamin D, 25-Hydroxy (10.03.20 @ 07:05)   Vitamin D, 25-Hydroxy: 22:     30 - 80 ng/mL Optimum Levels                         12.1   4.78  )-----------( 223      ( 08 Oct 2020 05:24 )             36.2      RADIOLOGY STUDIES  r< from: MR Abdomen w/wo IV Cont (10.02.20 @ 22:52) >  Impression:  1. Findings consistent with acute interstitial edematous pancreatitis, improved since September 20, 2020. No evidence of acute fluid collection.  2. Pancreatic uncinate process 2.2 cm cystic lesion with communication to nondilated main pancreatic duct, most consistent with pancreatic sidebranch IPMN. Follow-up MRCP in 6-12months recommended.  3. Left adrenal 1.1 cm adenoma.

## 2020-10-13 DIAGNOSIS — E55.9 VITAMIN D DEFICIENCY, UNSPECIFIED: ICD-10-CM

## 2020-10-13 DIAGNOSIS — E66.9 OBESITY, UNSPECIFIED: ICD-10-CM

## 2020-10-13 DIAGNOSIS — K85.00 IDIOPATHIC ACUTE PANCREATITIS WITHOUT NECROSIS OR INFECTION: ICD-10-CM

## 2020-10-13 DIAGNOSIS — K86.2 CYST OF PANCREAS: ICD-10-CM

## 2020-10-13 DIAGNOSIS — E78.5 HYPERLIPIDEMIA, UNSPECIFIED: ICD-10-CM

## 2020-10-13 DIAGNOSIS — Z87.891 PERSONAL HISTORY OF NICOTINE DEPENDENCE: ICD-10-CM

## 2020-10-13 DIAGNOSIS — D13.6 BENIGN NEOPLASM OF PANCREAS: ICD-10-CM

## 2021-10-19 PROBLEM — K85.90 ACUTE PANCREATITIS WITHOUT NECROSIS OR INFECTION, UNSPECIFIED: Chronic | Status: ACTIVE | Noted: 2020-10-01

## 2021-10-25 ENCOUNTER — APPOINTMENT (OUTPATIENT)
Dept: CARDIOLOGY | Facility: CLINIC | Age: 56
End: 2021-10-25
Payer: MEDICAID

## 2021-10-25 VITALS
SYSTOLIC BLOOD PRESSURE: 112 MMHG | WEIGHT: 190 LBS | BODY MASS INDEX: 27.2 KG/M2 | HEIGHT: 70 IN | DIASTOLIC BLOOD PRESSURE: 70 MMHG

## 2021-10-25 DIAGNOSIS — F17.200 NICOTINE DEPENDENCE, UNSPECIFIED, UNCOMPLICATED: ICD-10-CM

## 2021-10-25 PROCEDURE — 99204 OFFICE O/P NEW MOD 45 MIN: CPT

## 2021-10-25 PROCEDURE — 93000 ELECTROCARDIOGRAM COMPLETE: CPT

## 2021-10-25 NOTE — DISCUSSION/SUMMARY
[FreeTextEntry1] : 55 yo male hx tob abuse . He had Whipple 2/21. He had hernia repair 10/21. He had afib flutter post op. No MI. He denies chest pain sob. No palpitations. He quit smoking 10/5/2019. Will  try decrease metoprolol to 25 2 pill twice a day. Will eventually try get rid cardizem. Reviewed family and patient this may occur again. MORENA vasc 0. May need MCOT eventually. Eventual stop cardizem and lopressor 25 bid

## 2021-10-25 NOTE — REVIEW OF SYSTEMS
[Cough] : cough [Urinary Frequency] : urinary frequency [Rash] : rash [Fever] : no fever [Chills] : no chills [Blurry Vision] : no blurred vision [Hearing Loss] : no hearing loss [SOB] : no shortness of breath [Wheezing] : no wheezing [Abdominal Pain] : no abdominal pain [Nocturia] : no nocturia [Joint Pain] : no joint pain [Dizziness] : no dizziness [Confusion] : no confusion was observed [Easy Bleeding] : no tendency for easy bleeding

## 2021-10-25 NOTE — PHYSICAL EXAM
[Well Developed] : well developed [Well Nourished] : well nourished [Normal Conjunctiva] : normal conjunctiva [Normal Venous Pressure] : normal venous pressure [No Carotid Bruit] : no carotid bruit [Normal S1, S2] : normal S1, S2 [No Murmur] : no murmur [Clear Lung Fields] : clear lung fields [Good Air Entry] : good air entry [Soft] : abdomen soft [Non Tender] : non-tender [Normal Gait] : normal gait [No Edema] : no edema [No Skin Lesions] : no skin lesions [Moves all extremities] : moves all extremities [Alert and Oriented] : alert and oriented

## 2021-10-25 NOTE — HISTORY OF PRESENT ILLNESS
[FreeTextEntry1] : 55 yo male hx tob abuse . He had Whipple 2/21. He had hernia repair 10/21. He had afib flutter post op. No MI. He denies chest pain sob. No palpitations. He quit smoking 10/5/2019.

## 2021-10-26 RX ORDER — PANCRELIPASE 120000; 24000; 76000 [USP'U]/1; [USP'U]/1; [USP'U]/1
24000-76000 CAPSULE, DELAYED RELEASE PELLETS ORAL
Qty: 400 | Refills: 0 | Status: ACTIVE | COMMUNITY
Start: 2020-12-16

## 2021-10-26 RX ORDER — ACETAMINOPHEN 325 MG/1
325 TABLET ORAL
Qty: 30 | Refills: 0 | Status: ACTIVE | COMMUNITY
Start: 2021-10-16

## 2021-10-26 RX ORDER — OXYCODONE 5 MG/1
5 TABLET ORAL
Qty: 8 | Refills: 0 | Status: ACTIVE | COMMUNITY
Start: 2021-10-16

## 2021-10-27 RX ORDER — METOPROLOL TARTRATE 37.5 MG/1
37.5 TABLET, FILM COATED ORAL EVERY 6 HOURS
Refills: 0 | Status: DISCONTINUED | COMMUNITY
End: 2021-10-27

## 2021-10-27 RX ORDER — METOPROLOL SUCCINATE 100 MG/1
100 TABLET, EXTENDED RELEASE ORAL
Qty: 90 | Refills: 0 | Status: DISCONTINUED | COMMUNITY
Start: 2021-10-16 | End: 2021-10-27

## 2021-11-01 ENCOUNTER — RX RENEWAL (OUTPATIENT)
Age: 56
End: 2021-11-01

## 2021-12-20 ENCOUNTER — APPOINTMENT (OUTPATIENT)
Dept: CARDIOLOGY | Facility: CLINIC | Age: 56
End: 2021-12-20
Payer: MEDICAID

## 2021-12-20 VITALS
BODY MASS INDEX: 28.06 KG/M2 | DIASTOLIC BLOOD PRESSURE: 72 MMHG | HEIGHT: 70 IN | WEIGHT: 196 LBS | SYSTOLIC BLOOD PRESSURE: 124 MMHG

## 2021-12-20 DIAGNOSIS — K43.6 OTHER AND UNSPECIFIED VENTRAL HERNIA WITH OBSTRUCTION, W/OUT GANGRENE: ICD-10-CM

## 2021-12-20 DIAGNOSIS — I48.91 UNSPECIFIED ATRIAL FIBRILLATION: ICD-10-CM

## 2021-12-20 PROCEDURE — 99214 OFFICE O/P EST MOD 30 MIN: CPT

## 2021-12-20 RX ORDER — METOPROLOL TARTRATE 25 MG/1
25 TABLET, FILM COATED ORAL
Qty: 60 | Refills: 5 | Status: DISCONTINUED | COMMUNITY
Start: 2021-10-18 | End: 2021-12-20

## 2021-12-20 RX ORDER — DILTIAZEM HYDROCHLORIDE 120 MG/1
120 CAPSULE, EXTENDED RELEASE ORAL DAILY
Qty: 30 | Refills: 3 | Status: DISCONTINUED | COMMUNITY
Start: 1900-01-01 | End: 2021-12-20

## 2021-12-20 RX ORDER — DILTIAZEM HYDROCHLORIDE 120 MG/1
120 CAPSULE, EXTENDED RELEASE ORAL
Qty: 90 | Refills: 0 | Status: DISCONTINUED | COMMUNITY
Start: 2021-10-18 | End: 2021-12-20

## 2021-12-20 NOTE — HISTORY OF PRESENT ILLNESS
[FreeTextEntry1] : 57 yo male hx tob abuse . He had Whipple 2/21. He had hernia repair 10/21. He had afib flutter post op. No MI. He denies chest pain sob.  He quit smoking 10/5/2019. He gets denton . He gained 6 pounds. . he had 4 episodes palpitations 20 minutes

## 2021-12-20 NOTE — DISCUSSION/SUMMARY
[FreeTextEntry1] : 57 yo male hx tob abuse . He had Whipple 2/21. He had hernia repair 10/21. He had afib flutter post op. No MI. He denies chest pain sob. No palpitations. He quit smoking 10/5/2019.    MORENA vasc 0. Will do MCOT. He may need ablation

## 2021-12-28 RX ORDER — METOPROLOL TARTRATE 50 MG/1
50 TABLET, FILM COATED ORAL
Qty: 180 | Refills: 3 | Status: ACTIVE | COMMUNITY
Start: 2021-12-28 | End: 1900-01-01

## 2022-01-11 ENCOUNTER — APPOINTMENT (OUTPATIENT)
Dept: CARDIOLOGY | Facility: CLINIC | Age: 57
End: 2022-01-11
Payer: MEDICAID

## 2022-01-11 PROCEDURE — 93228 REMOTE 30 DAY ECG REV/REPORT: CPT

## 2022-03-28 ENCOUNTER — APPOINTMENT (OUTPATIENT)
Dept: CARDIOLOGY | Facility: CLINIC | Age: 57
End: 2022-03-28

## 2022-04-28 NOTE — PRE-ANESTHESIA EVALUATION ADULT - ANESTHESIA, PREVIOUS REACTION, PROFILE
We called the facility and they are legally not able to not allow patients to dispence their own medication. They have had the patient sign a risk release form.    none

## 2022-12-05 ENCOUNTER — RX RENEWAL (OUTPATIENT)
Age: 57
End: 2022-12-05

## 2022-12-05 RX ORDER — FOLIC ACID 1 MG/1
1 TABLET ORAL
Qty: 90 | Refills: 0 | Status: ACTIVE | COMMUNITY
Start: 2020-10-09 | End: 1900-01-01

## 2023-04-17 NOTE — ED ADULT NURSE NOTE - ISOLATION TYPE:
How Many Mls Were Removed From The 10 Mg/Ml (5ml) Vial When Preparing The Injectable Solution?: 0 Validate Note Data When Using Inventory: Yes Medical Necessity Clause: This procedure was medically necessary because the lesions that were treated were: Bill For Wasted Drug?: no Detail Level: Detailed Consent: The risks of atrophy were reviewed with the patient. Concentration Of Solution Injected (Mg/Ml): 40.0 Total Volume Injected (Ccs- Only Use Numbers And Decimals): 0.3 Kenalog Preparation: Kenalog None

## 2025-04-02 NOTE — ED ADULT TRIAGE NOTE - TEMPERATURE IN CELSIUS (DEGREES C)
[facility] Utility - Financial Resources*  (Call [employer]/211 if need more resources.)    Utilities  [employer]  What they offer: Partnership with the [facility] of . Assist with finding and applying for government funded programs and benefits. You can also update your benefits or report changes through [facility].  Website: https://www.[employer]Networksvir[place]BlackSquare/  Phone Number: 833-5CALLVA ([phone])    [employer]  What they offer: EnergyShare is [facility]'s energy assistance program of last resort for  anyone who faces financial hardships from unemployment or family crisis.  Phone Number: [phone]  Address: [address] [place] [address]  Website: https://www.[employer]/[place]/billing/billing-options/energyshare    Energy Assistance Programs (EAP) - Department of   What they offer: EAP assists low income households in meeting their immediate home energy needs.  Phone Number: 998.901.7625 or contact your local department of   Website: https://www.dss.[place].gov/benefit/ea/    [facility]  What they offer: Bill and utility assistance  Phone number: 157.219.3262.  Website: https://[employer]arker    [employer]  What they offer: Utility, rent, mortgage assistance   Phone number: 315.263.4080.  Website: https://[employer].org  Regional Housing Crisis Hotline  Can assist with rent assistance, eviction prevention and utility bills.  Phone Number: 632.849.2419    Medical  [employer] Financial Assistance  What they offer: The [employer] Financial Assistance Program helps uninsured patients who do not qualify for government-sponsored health insurance and cannot afford to pay for their medical care. Insured patient may also qualify for assistance based on family income, family size, and medical needs.  Phone Number: [phone]  How to apply for the [employer] Financial Assistance 
37.1